# Patient Record
Sex: FEMALE | Race: WHITE | NOT HISPANIC OR LATINO | Employment: OTHER | URBAN - METROPOLITAN AREA
[De-identification: names, ages, dates, MRNs, and addresses within clinical notes are randomized per-mention and may not be internally consistent; named-entity substitution may affect disease eponyms.]

---

## 2019-09-09 ENCOUNTER — OFFICE VISIT (OUTPATIENT)
Dept: OBGYN | Facility: OTHER | Age: 34
End: 2019-09-09

## 2019-09-09 VITALS
SYSTOLIC BLOOD PRESSURE: 110 MMHG | BODY MASS INDEX: 19.59 KG/M2 | HEART RATE: 100 BPM | DIASTOLIC BLOOD PRESSURE: 76 MMHG | WEIGHT: 124.8 LBS | HEIGHT: 67 IN

## 2019-09-09 DIAGNOSIS — F11.90 OPIOID USE DISORDER: Primary | ICD-10-CM

## 2019-09-09 DIAGNOSIS — Z12.4 CERVICAL CANCER SCREENING: ICD-10-CM

## 2019-09-09 DIAGNOSIS — Z11.3 ROUTINE SCREENING FOR STI (SEXUALLY TRANSMITTED INFECTION): ICD-10-CM

## 2019-09-09 LAB
ALBUMIN SERPL-MCNC: 4.2 G/DL (ref 3.4–5)
ALP SERPL-CCNC: 74 U/L (ref 40–150)
ALT SERPL W P-5'-P-CCNC: 36 U/L (ref 0–50)
ANION GAP SERPL CALCULATED.3IONS-SCNC: 6 MMOL/L (ref 3–14)
AST SERPL W P-5'-P-CCNC: 23 U/L (ref 0–45)
BILIRUB SERPL-MCNC: 0.3 MG/DL (ref 0.2–1.3)
BUN SERPL-MCNC: 7 MG/DL (ref 7–30)
CALCIUM SERPL-MCNC: 9.4 MG/DL (ref 8.5–10.1)
CHLORIDE SERPL-SCNC: 108 MMOL/L (ref 94–109)
CO2 SERPL-SCNC: 27 MMOL/L (ref 20–32)
CREAT SERPL-MCNC: 0.63 MG/DL (ref 0.52–1.04)
GFR SERPL CREATININE-BSD FRML MDRD: >90 ML/MIN/{1.73_M2}
GLUCOSE SERPL-MCNC: 99 MG/DL (ref 70–99)
POTASSIUM SERPL-SCNC: 4.2 MMOL/L (ref 3.4–5.3)
PROT SERPL-MCNC: 7.5 G/DL (ref 6.8–8.8)
SODIUM SERPL-SCNC: 141 MMOL/L (ref 133–144)

## 2019-09-09 PROCEDURE — 80053 COMPREHEN METABOLIC PANEL: CPT | Performed by: OBSTETRICS & GYNECOLOGY

## 2019-09-09 PROCEDURE — 36415 COLL VENOUS BLD VENIPUNCTURE: CPT | Performed by: OBSTETRICS & GYNECOLOGY

## 2019-09-09 PROCEDURE — 87591 N.GONORRHOEAE DNA AMP PROB: CPT | Performed by: OBSTETRICS & GYNECOLOGY

## 2019-09-09 PROCEDURE — 87389 HIV-1 AG W/HIV-1&-2 AB AG IA: CPT | Performed by: OBSTETRICS & GYNECOLOGY

## 2019-09-09 PROCEDURE — 87340 HEPATITIS B SURFACE AG IA: CPT | Performed by: OBSTETRICS & GYNECOLOGY

## 2019-09-09 PROCEDURE — 87624 HPV HI-RISK TYP POOLED RSLT: CPT | Performed by: OBSTETRICS & GYNECOLOGY

## 2019-09-09 PROCEDURE — 99203 OFFICE O/P NEW LOW 30 MIN: CPT | Performed by: OBSTETRICS & GYNECOLOGY

## 2019-09-09 PROCEDURE — 86780 TREPONEMA PALLIDUM: CPT | Performed by: OBSTETRICS & GYNECOLOGY

## 2019-09-09 PROCEDURE — G0145 SCR C/V CYTO,THINLAYER,RESCR: HCPCS | Performed by: OBSTETRICS & GYNECOLOGY

## 2019-09-09 PROCEDURE — 86803 HEPATITIS C AB TEST: CPT | Performed by: OBSTETRICS & GYNECOLOGY

## 2019-09-09 PROCEDURE — 87491 CHLMYD TRACH DNA AMP PROBE: CPT | Performed by: OBSTETRICS & GYNECOLOGY

## 2019-09-09 RX ORDER — BUPRENORPHINE HYDROCHLORIDE AND NALOXONE HYDROCHLORIDE DIHYDRATE 8; 2 MG/1; MG/1
1 TABLET SUBLINGUAL DAILY
Qty: 12 TABLET | Refills: 0 | Status: SHIPPED | OUTPATIENT
Start: 2019-09-09 | End: 2019-10-14

## 2019-09-09 RX ORDER — BUPRENORPHINE HYDROCHLORIDE AND NALOXONE HYDROCHLORIDE DIHYDRATE 2; .5 MG/1; MG/1
1 TABLET SUBLINGUAL 2 TIMES DAILY
Qty: 8 TABLET | Refills: 0 | Status: SHIPPED | OUTPATIENT
Start: 2019-09-09 | End: 2019-10-14

## 2019-09-09 SDOH — HEALTH STABILITY: MENTAL HEALTH: HOW OFTEN DO YOU HAVE A DRINK CONTAINING ALCOHOL?: NEVER

## 2019-09-09 ASSESSMENT — MIFFLIN-ST. JEOR: SCORE: 1301.33

## 2019-09-09 NOTE — PROGRESS NOTES
Clinic Note    CC:    Chief Complaint   Patient presents with     Consult     discuss suboxone        HPI:  Francisca Reed is a 34 year old  woman with history of opioid addiction, presents to initiate suboxone therapy. Using opioids (pills) for 10 years, first began after a back surgery, has used on an off, mostly gotten illicitly, using a combination of suboxone and oxy over past month, no consistent dosing. She does have a depression hx, also relatively severe postpartum depression, denies other mental health hx, denies abuse, violence, sexual trauma hx. Identifies as having an opioid addiction and wants help. , stable relationship, parenting children. Of note, she is not insured.      Ob Hx:  s/p CS x2 including 36w twins  Gyn Hx: No LMP recorded (lmp unknown). (Menstrual status: IUD).    Last pap ASCUS, HPV+ (), hx of Colposcopy x2   Using an IUD for birth control  PMH:   History reviewed. No pertinent past medical history.  SurgHx:   Past Surgical History:   Procedure Laterality Date     BACK SURGERY      fusion left side lower back     broken bone      arm     C BREAST AUGMENTATION  2010      SECTION      x2     FamHx:   Family History   Problem Relation Age of Onset     Diabetes Father      SocHx:   Social History     Socioeconomic History     Marital status: Single     Spouse name: None     Number of children: None     Years of education: None     Highest education level: None   Occupational History     None   Social Needs     Financial resource strain: None     Food insecurity:     Worry: None     Inability: None     Transportation needs:     Medical: None     Non-medical: None   Tobacco Use     Smoking status: Current Every Day Smoker     Smokeless tobacco: Never Used   Substance and Sexual Activity     Alcohol use: Never     Frequency: Never     Drug use: Yes     Types: Oxycodone     Sexual activity: Yes     Partners: Male     Birth control/protection: IUD      "Comment: mirena - placed 2 years ago   Lifestyle     Physical activity:     Days per week: None     Minutes per session: None     Stress: None   Relationships     Social connections:     Talks on phone: None     Gets together: None     Attends [employer] service: None     Active member of club or organization: None     Attends meetings of clubs or organizations: None     Relationship status: None     Intimate partner violence:     Fear of current or ex partner: None     Emotionally abused: None     Physically abused: None     Forced sexual activity: None   Other Topics Concern     None   Social History Narrative     None     Allergies:   Amoxicillin  Current Medications:   Current Outpatient Medications   Medication Sig Dispense Refill     buprenorphine-naloxone (SUBOXONE) 2-0.5 MG SUBL sublingual tablet Place 1 tablet under the tongue 2 times daily 8 tablet 0     IBUPROFEN PO        levonorgestrel (MIRENA) 20 MCG/24HR IUD 1 each by Intrauterine route once       ROS: 10 point ROS negative other than as noted in the HPI    EXAM:  Vitals:    09/09/19 1116   BP: 110/76   BP Location: Right arm   Patient Position: Chair   Cuff Size: Adult Regular   Pulse: 100   Weight: 56.6 kg (124 lb 12.8 oz)   Height: 1.69 m (5' 6.54\")    Body mass index is 19.82 kg/m .    Gen: Alert, oriented, appropriately interactive, NAD  CV: RRR, no murmurs, no extra heart sounds, 2+ peripheral pulses  Resp: CTAB, good effort without distress   Breasts: implants bilaterally, no axillary adenopathy, no dominant masses, no skin changes, no nipple discharge, nontender  Abdomen: soft, non tender, non distended, no masses, no hernias. No inguinal lymphadenopathy.   Perineum: no lesions; normal appearing external genitalia, bartholins glands, urethra, skenes glands  Vagina: no masses or lesions or discharge, normally rugated.  Cervix: no masses or lesions or discharge   Bimanual exam:   Uterus: midline, anteverted, small, mobile  no masses, " non-tender  Adnexa: no masses or tenderness appreciated   No cervical motion tenderness  MSK: normal gait, symmetric movements UE & LE  Lower extremities: non-tender, no edema    Labs & Imaging:  Results for orders placed or performed in visit on 19 (from the past 24 hour(s))   Comprehensive metabolic panel (BMP + Alb, Alk Phos, ALT, AST, Total. Bili, TP)   Result Value Ref Range    Sodium 141 133 - 144 mmol/L    Potassium 4.2 3.4 - 5.3 mmol/L    Chloride 108 94 - 109 mmol/L    Carbon Dioxide 27 20 - 32 mmol/L    Anion Gap 6 3 - 14 mmol/L    Glucose 99 70 - 99 mg/dL    Urea Nitrogen 7 7 - 30 mg/dL    Creatinine 0.63 0.52 - 1.04 mg/dL    GFR Estimate >90 >60 mL/min/[1.73_m2]    GFR Estimate If Black >90 >60 mL/min/[1.73_m2]    Calcium 9.4 8.5 - 10.1 mg/dL    Bilirubin Total 0.3 0.2 - 1.3 mg/dL    Albumin 4.2 3.4 - 5.0 g/dL    Protein Total 7.5 6.8 - 8.8 g/dL    Alkaline Phosphatase 74 40 - 150 U/L    ALT 36 0 - 50 U/L    AST 23 0 - 45 U/L       ASSESSMENT/PLAN: Francisca Reed is a 34 year old  woman with history of opioid use disorder who presents for evaluation of suboxone initiation.     1. Opioid use disorder (H)  - Presenting in withdrawal, no use in two days, titrated to 8mg over one hour in clinic, instructed to potentially titrate up to 12 vs 16mg as needed.   - ID and liver labs today, normal physical exam  - Reviewed expectations, suboxone contract signed  - will return next week to f/u dose, plan for ongoing cares   - buprenorphine-naloxone (SUBOXONE) 2-0.5 MG SUBL sublingual tablet; Place 1 tablet under the tongue 2 times daily  Dispense: 8 tablet; Refill: 0    2. Routine screening for STI (sexually transmitted infection)  - No IVDU, no know sexual exposure to an IVDU  - Comprehensive metabolic panel (BMP + Alb, Alk Phos, ALT, AST, Total. Bili, TP)  - Hepatitis C antibody  - HIV Antigen Antibody Combo  - Treponema Abs w Reflex to RPR and Titer  - Hepatitis B surface antigen  - Chlamydia  trachomatis PCR  - Neisseria gonorrhoeae PCR    3. Cervical cancer screening  - Normal pelvic exam today  - Pap imaged thin layer screen with HPV - recommended age 30 - 65 years (select HPV order below)    Silvino Amado MD MD  9/9/2019 11:43 AM

## 2019-09-09 NOTE — LETTER
September 17, 2019    Francisca Reed  [address] [place] [address]  [place] [address]    Dear ,  This letter is regarding your recent Pap smear (cervical cancer screening) and Human Papillomavirus (HPV) test.  We are happy to inform you that your Pap smear result is normal. Cervical cancer is closely linked with certain types of HPV. Your results showed no evidence of high-risk HPV.  We recommend you have your next PAP smear and HPV test in 5 years.  You will still need to return to the clinic every year for an annual exam and other preventive tests.  If you have additional questions regarding this result, please call our registered nurse, Guerline at [phone].  Sincerely,    Silvino Amado MD /[facility]

## 2019-09-09 NOTE — NURSING NOTE
"Chief Complaint   Patient presents with     Consult     discuss suboxone       Initial /76 (BP Location: Right arm, Patient Position: Chair, Cuff Size: Adult Regular)   Pulse 100   Ht 1.69 m (5' 6.54\")   Wt 56.6 kg (124 lb 12.8 oz)   LMP  (LMP Unknown)   BMI 19.82 kg/m   Estimated body mass index is 19.82 kg/m  as calculated from the following:    Height as of this encounter: 1.69 m (5' 6.54\").    Weight as of this encounter: 56.6 kg (124 lb 12.8 oz).  BP completed using cuff size: regular    No obstetric history on file.    The following HM Due: pap smear      The following patient reported/Care Every where data was sent to:  P ABSTRACT QUALITY INITIATIVES [[id number]]  Pap smear done on this date: 1/22/2016 (approximately), by this group: [facility], results were NIL.      patient has appointment for today       Marge Silva CMA  September 9, 2019    "

## 2019-09-10 LAB
C TRACH DNA SPEC QL NAA+PROBE: NEGATIVE
HBV SURFACE AG SERPL QL IA: NONREACTIVE
HCV AB SERPL QL IA: NONREACTIVE
HIV 1+2 AB+HIV1 P24 AG SERPL QL IA: NONREACTIVE
N GONORRHOEA DNA SPEC QL NAA+PROBE: NEGATIVE
SPECIMEN SOURCE: NORMAL
SPECIMEN SOURCE: NORMAL
T PALLIDUM AB SER QL: NONREACTIVE

## 2019-09-13 LAB
COPATH REPORT: NORMAL
PAP: NORMAL

## 2019-09-16 ENCOUNTER — OFFICE VISIT (OUTPATIENT)
Dept: OBGYN | Facility: OTHER | Age: 34
End: 2019-09-16

## 2019-09-16 VITALS
SYSTOLIC BLOOD PRESSURE: 110 MMHG | HEART RATE: 90 BPM | DIASTOLIC BLOOD PRESSURE: 60 MMHG | WEIGHT: 121.4 LBS | BODY MASS INDEX: 19.28 KG/M2

## 2019-09-16 DIAGNOSIS — F11.90 OPIOID USE DISORDER: Primary | ICD-10-CM

## 2019-09-16 LAB
FINAL DIAGNOSIS: NORMAL
HPV HR 12 DNA CVX QL NAA+PROBE: NEGATIVE
HPV16 DNA SPEC QL NAA+PROBE: NEGATIVE
HPV18 DNA SPEC QL NAA+PROBE: NEGATIVE
SPECIMEN DESCRIPTION: NORMAL
SPECIMEN SOURCE CVX/VAG CYTO: NORMAL

## 2019-09-16 PROCEDURE — 99213 OFFICE O/P EST LOW 20 MIN: CPT | Performed by: OBSTETRICS & GYNECOLOGY

## 2019-09-16 RX ORDER — BUPRENORPHINE HYDROCHLORIDE AND NALOXONE HYDROCHLORIDE DIHYDRATE 2; .5 MG/1; MG/1
2 TABLET SUBLINGUAL DAILY
Qty: 42 TABLET | Refills: 0 | Status: SHIPPED | OUTPATIENT
Start: 2019-09-16 | End: 2019-10-14

## 2019-09-16 RX ORDER — BUPRENORPHINE HYDROCHLORIDE AND NALOXONE HYDROCHLORIDE DIHYDRATE 8; 2 MG/1; MG/1
1 TABLET SUBLINGUAL DAILY
Qty: 21 TABLET | Refills: 0 | Status: SHIPPED | OUTPATIENT
Start: 2019-09-16 | End: 2020-01-09

## 2019-09-16 NOTE — NURSING NOTE
"Chief Complaint   Patient presents with     Consult     suboxone       Initial /60 (BP Location: Right arm, Patient Position: Chair, Cuff Size: Adult Regular)   Pulse 90   Wt 55.1 kg (121 lb 6.4 oz)   LMP  (LMP Unknown)   BMI 19.28 kg/m   Estimated body mass index is 19.28 kg/m  as calculated from the following:    Height as of 19: 1.69 m (5' 6.54\").    Weight as of this encounter: 55.1 kg (121 lb 6.4 oz).  BP completed using cuff size: regular        The following HM Due: NONE      The following patient reported/Care Every where data was sent to:  P ABSTRACT QUALITY INITIATIVES [[id number]]      Marge Silva, OLIVIA  2019                "

## 2019-09-17 NOTE — PROGRESS NOTES
Clinic Note    CC:    Chief Complaint   Patient presents with     Consult     suboxone        HPI:  Francisca Reed is a 34 year old  who presents for suboxone follow up, initiated on 9/9, now on 12mg/day. She states doing well, no withdrawal symptoms, no opioid relapse, did use one ativan with very stressful well though goal to discontinue benzos as well. Overall feels much better on stable dose of Suboxone, feeling happy and relieved.     History: Using opioids (pills) for 10 years, first began after a back surgery, has used on an off, mostly gotten illicitly, using a combination of suboxone and oxy over past month prior to initiation, no consistent dosing. She does have a depression hx, also relatively severe postpartum depression, denies other mental health hx, denies abuse, violence, sexual trauma hx. Identifies as having an opioid addiction and wants help. , stable relationship, parenting children. Of note, she is not insured.      EXAM:  Vitals:    09/16/19 1115   BP: 110/60   BP Location: Right arm   Patient Position: Chair   Cuff Size: Adult Regular   Pulse: 90   Weight: 55.1 kg (121 lb 6.4 oz)    Body mass index is 19.28 kg/m .    Gen: Alert, oriented, appropriately interactive, NAD  Full normal exam on 9/9/19, not repeated today    Labs & Imaging:  CMP wnl  PAP NIL, HPV-  Hep C-  HBSA-  Trep-  HIV-  GC/Chlam-    ASSESSMENT/PLAN: Francisca Reed is a 34 year old  who presents for suboxone follow up    1. Opioid use disorder (H)  - Will continue 12 mg dose  - Plan UDS at next appt  - buprenorphine-naloxone (SUBOXONE) 8-2 MG SUBL sublingual tablet; Place 1 tablet under the tongue daily for 21 days Daily dose 12mg  Dispense: 21 tablet; Refill: 0  - buprenorphine-naloxone (SUBOXONE) 2-0.5 MG SUBL sublingual tablet; Place 2 tablets under the tongue daily for 21 days  Dispense: 42 tablet; Refill: 0    Silvino Amado MD  9/16/2019 10:33 PM

## 2019-09-19 ENCOUNTER — TELEPHONE (OUTPATIENT)
Dept: FAMILY MEDICINE | Facility: OTHER | Age: 34
End: 2019-09-19

## 2019-09-19 NOTE — TELEPHONE ENCOUNTER
Reason for Call:  Other prescription    Detailed comments: Suboxone.  Patient would like to get 1 1/2 tab of 8 mg daily cause it would be cheaper for her so the quantity would change She would like it to go to [facility]    Phone Number Patient can be reached at: Cell number on file:    Telephone Information:   Mobile [phone]       Best Time: anytime    Can we leave a detailed message on this number? YES    Call taken on 9/19/2019 at 10:54 AM by Macario Hannah

## 2019-10-14 ENCOUNTER — OFFICE VISIT (OUTPATIENT)
Dept: OBGYN | Facility: OTHER | Age: 34
End: 2019-10-14

## 2019-10-14 VITALS
HEART RATE: 86 BPM | DIASTOLIC BLOOD PRESSURE: 70 MMHG | SYSTOLIC BLOOD PRESSURE: 114 MMHG | WEIGHT: 122.4 LBS | BODY MASS INDEX: 19.44 KG/M2

## 2019-10-14 DIAGNOSIS — F11.90 OPIOID USE DISORDER: Primary | ICD-10-CM

## 2019-10-14 PROCEDURE — 99213 OFFICE O/P EST LOW 20 MIN: CPT | Performed by: OBSTETRICS & GYNECOLOGY

## 2019-10-14 PROCEDURE — 80307 DRUG TEST PRSMV CHEM ANLYZR: CPT | Mod: 90 | Performed by: OBSTETRICS & GYNECOLOGY

## 2019-10-14 PROCEDURE — 99000 SPECIMEN HANDLING OFFICE-LAB: CPT | Performed by: OBSTETRICS & GYNECOLOGY

## 2019-10-14 RX ORDER — BUPRENORPHINE HYDROCHLORIDE AND NALOXONE HYDROCHLORIDE DIHYDRATE 8; 2 MG/1; MG/1
1 TABLET SUBLINGUAL DAILY
Qty: 21 TABLET | Refills: 0 | Status: SHIPPED | OUTPATIENT
Start: 2019-10-14 | End: 2019-10-28

## 2019-10-14 NOTE — NURSING NOTE
"Chief Complaint   Patient presents with     Follow Up     suboxone       Initial /70 (BP Location: Right arm, Patient Position: Chair, Cuff Size: Adult Regular)   Pulse 86   Wt 55.5 kg (122 lb 6.4 oz)   LMP  (LMP Unknown)   BMI 19.44 kg/m   Estimated body mass index is 19.44 kg/m  as calculated from the following:    Height as of 19: 1.69 m (5' 6.54\").    Weight as of this encounter: 55.5 kg (122 lb 6.4 oz).  BP completed using cuff size: regular        The following HM Due: NONE      The following patient reported/Care Every where data was sent to:  P ABSTRACT QUALITY INITIATIVES [[id number]]       Marge Silva, [facility]  2019           "

## 2019-10-18 LAB — COMPREHEN DRUG ANALYSIS UR: NORMAL

## 2019-10-28 ENCOUNTER — OFFICE VISIT (OUTPATIENT)
Dept: OBGYN | Facility: OTHER | Age: 34
End: 2019-10-28

## 2019-10-28 VITALS
WEIGHT: 127.6 LBS | HEART RATE: 76 BPM | DIASTOLIC BLOOD PRESSURE: 60 MMHG | BODY MASS INDEX: 20.27 KG/M2 | SYSTOLIC BLOOD PRESSURE: 108 MMHG

## 2019-10-28 DIAGNOSIS — F11.90 OPIOID USE DISORDER: Primary | ICD-10-CM

## 2019-10-28 PROCEDURE — 99213 OFFICE O/P EST LOW 20 MIN: CPT | Performed by: OBSTETRICS & GYNECOLOGY

## 2019-10-28 RX ORDER — BUPRENORPHINE HYDROCHLORIDE AND NALOXONE HYDROCHLORIDE DIHYDRATE 8; 2 MG/1; MG/1
1 TABLET SUBLINGUAL DAILY
Qty: 14 TABLET | Refills: 0 | Status: SHIPPED | OUTPATIENT
Start: 2019-10-28 | End: 2019-11-18

## 2019-10-28 NOTE — NURSING NOTE
"Chief Complaint   Patient presents with     Follow Up     suboxone       Initial /60 (BP Location: Right arm, Patient Position: Chair, Cuff Size: Adult Regular)   Pulse 76   Wt 57.9 kg (127 lb 9.6 oz)   LMP  (LMP Unknown)   BMI 20.27 kg/m   Estimated body mass index is 20.27 kg/m  as calculated from the following:    Height as of 19: 1.69 m (5' 6.54\").    Weight as of this encounter: 57.9 kg (127 lb 9.6 oz).  BP completed using cuff size: regular        The following HM Due: NONE      The following patient reported/Care Every where data was sent to:  P ABSTRACT QUALITY INITIATIVES [[id number]]       Marge Silva, [facility]  2019           "

## 2019-10-30 NOTE — PROGRESS NOTES
Clinic Note    CC:    Chief Complaint   Patient presents with     Follow Up     suboxone      HPI:  Francisca Reed is a 34 year old  who presents for suboxone follow up, initiated on . She is taking 8mg without withdrawal symptoms.  Amphetamines on last UDS (10/14), reports occasional use of adderall, trying to ween off.   She would like to see a mental health specialist though no insurance, and unable to afford any additional care. States her  controls all the money including the child support she receives and hands over to him. He is unwilling to support her application for health care.   She started Sertraline last appt, now titrated to full dose, no side effects noted, will continue to use.   History: Using opioids (pills) for 10 years, first began after a back surgery, has used on an off, mostly gotten illicitly, using a combination of suboxone and oxy over past month prior to initiation, no consistent dosing. She does have a depression hx, also relatively severe postpartum depression, denies other mental health hx, denies abuse, violence, sexual trauma hx. Identifies as having an opioid addiction and wants help. ,, parenting children. Of note, she is not insured.      PMH:   Past Medical History:   Diagnosis Date     C section 2008     Papanicolaou smear of cervix with low grade squamous intraepithelial lesion (LGSIL) 2008 Pap: LSIL (see Care Everywhere)     SurgHx:   Past Surgical History:   Procedure Laterality Date     BACK SURGERY      fusion left side lower back     BREAST SURGERY  2010     broken bone      arm     C BREAST AUGMENTATION        SECTION        SECTION  2017    x2     GYN SURGERY       INJECT JOINT SACROILIAC       FamHx:   Family History   Problem Relation Age of Onset     Diabetes Father      SocHx:   Social History     Socioeconomic History     Marital status: Single     Spouse name: None     Number of  children: None     Years of education: None     Highest education level: None   Occupational History     None   Social Needs     Financial resource strain: None     Food insecurity:     Worry: None     Inability: None     Transportation needs:     Medical: None     Non-medical: None   Tobacco Use     Smoking status: Current Every Day Smoker     Packs/day: 0.00     Types: Cigarettes     Smokeless tobacco: Never Used   Substance and Sexual Activity     Alcohol use: Never     Frequency: Never     Drug use: Yes     Types: Oxycodone     Sexual activity: Yes     Partners: Male     Birth control/protection: I.U.D.     Comment: mirena - placed 2 years ago   Lifestyle     Physical activity:     Days per week: None     Minutes per session: None     Stress: None   Relationships     Social connections:     Talks on phone: None     Gets together: None     Attends [employer] service: None     Active member of club or organization: None     Attends meetings of clubs or organizations: None     Relationship status: None     Intimate partner violence:     Fear of current or ex partner: None     Emotionally abused: None     Physically abused: None     Forced sexual activity: None   Other Topics Concern     Parent/sibling w/ CABG, MI or angioplasty before 65F 55M? Not Asked   Social History Narrative    ** Merged History Encounter **          Allergies:   Amoxicillin  Current Medications:   Current Outpatient Medications   Medication Sig Dispense Refill     acetaminophen (TYLENOL) 650 MG CR tablet Take 1 tablet by mouth every 8 hours as needed for pain. 100 tablet 11     buprenorphine-naloxone (SUBOXONE) 8-2 MG SUBL sublingual tablet Place 1 tablet under the tongue daily Patient is initiating suboxone, titrated to 8mg first day, final daily dose will be either 8mg, 12mg, or 16mg, patient given instructions on how to titrate to final dose. 14 tablet 0     Ibuprofen 200 MG capsule Take 200 mg by mouth every 8 hours as needed for pain.        levonorgestrel (MIRENA) 20 MCG/24HR IUD 1 each by Intrauterine route once       sertraline (ZOLOFT) 50 MG tablet Take 1 tablet (50 mg) by mouth daily Take 1/2 pill daily for the first week, then take one daily 60 tablet 3     ROS: 10 point ROS negative other than as noted in the HPI    EXAM:  Vitals:    10/28/19 1137   BP: 108/60   BP Location: Right arm   Patient Position: Chair   Cuff Size: Adult Regular   Pulse: 76   Weight: 57.9 kg (127 lb 9.6 oz)    Body mass index is 20.27 kg/m .    Gen: Alert, oriented, appropriately interactive, NAD  Exam not repeated today     Labs & Imaging: (9/9/19)  CMP wnl  PAP NIL, HPV-  Hep C-  HBSA-  Trep-  HIV-  GC/Chlam-    ASSESSMENT/PLAN: Francisca Reed is a 34 year old  who presents for suboxone follow up.    1. Opioid use disorder (H)  - Continue 8mg/day  - Drug  Screen showing amphetamines (adderall)  - buprenorphine-naloxone (SUBOXONE) 8-2 MG SUBL sublingual tablet; Place 1 tablet under the tongue daily Patient is initiating suboxone, titrated to 8mg first day, final daily dose will be either 8mg, 12mg, or 16mg, patient given instructions on how to titrate to final dose.  Dispense: 14 tablet; Refill: 0     2. Postpartum depression  - I support her desire for mental health care, encouraged her to reach out to her [facility]  for resources   - Continue sertraline (ZOLOFT) 50 MG tablet    Silvino Amado MD   10/29/2019 10:37 PM

## 2019-11-18 ENCOUNTER — OFFICE VISIT (OUTPATIENT)
Dept: OBGYN | Facility: OTHER | Age: 34
End: 2019-11-18
Payer: MEDICAID

## 2019-11-18 VITALS
BODY MASS INDEX: 19.22 KG/M2 | DIASTOLIC BLOOD PRESSURE: 60 MMHG | HEART RATE: 80 BPM | SYSTOLIC BLOOD PRESSURE: 110 MMHG | WEIGHT: 121 LBS

## 2019-11-18 DIAGNOSIS — F11.90 OPIOID USE DISORDER: Primary | ICD-10-CM

## 2019-11-18 DIAGNOSIS — G47.00 PERSISTENT INSOMNIA: ICD-10-CM

## 2019-11-18 PROCEDURE — 99213 OFFICE O/P EST LOW 20 MIN: CPT | Performed by: OBSTETRICS & GYNECOLOGY

## 2019-11-18 RX ORDER — TRAZODONE HYDROCHLORIDE 50 MG/1
50 TABLET, FILM COATED ORAL
Qty: 30 TABLET | Refills: 3 | Status: SHIPPED | OUTPATIENT
Start: 2019-11-18 | End: 2020-01-09

## 2019-11-18 RX ORDER — BUPRENORPHINE HYDROCHLORIDE AND NALOXONE HYDROCHLORIDE DIHYDRATE 8; 2 MG/1; MG/1
1 TABLET SUBLINGUAL DAILY
Qty: 42 TABLET | Refills: 0 | Status: SHIPPED | OUTPATIENT
Start: 2019-11-18 | End: 2019-12-16

## 2019-11-18 NOTE — NURSING NOTE
"Chief Complaint   Patient presents with     Follow Up     suboxone       Initial /60 (BP Location: Right arm, Patient Position: Chair, Cuff Size: Adult Regular)   Pulse 80   Wt 54.9 kg (121 lb)   LMP  (LMP Unknown)   BMI 19.22 kg/m   Estimated body mass index is 19.22 kg/m  as calculated from the following:    Height as of 19: 1.69 m (5' 6.54\").    Weight as of this encounter: 54.9 kg (121 lb).  BP completed using cuff size: regular        The following HM Due: NONE      The following patient reported/Care Every where data was sent to:  P ABSTRACT QUALITY INITIATIVES [[id number]]       Marge Silva, OLIVIA  2019           "

## 2019-12-16 ENCOUNTER — OFFICE VISIT (OUTPATIENT)
Dept: OBGYN | Facility: OTHER | Age: 34
End: 2019-12-16
Payer: MEDICAID

## 2019-12-16 VITALS
SYSTOLIC BLOOD PRESSURE: 102 MMHG | DIASTOLIC BLOOD PRESSURE: 62 MMHG | BODY MASS INDEX: 18.8 KG/M2 | HEART RATE: 88 BPM | WEIGHT: 118.4 LBS

## 2019-12-16 DIAGNOSIS — R53.83 FATIGUE, UNSPECIFIED TYPE: ICD-10-CM

## 2019-12-16 DIAGNOSIS — F11.90 OPIOID USE DISORDER: Primary | ICD-10-CM

## 2019-12-16 PROCEDURE — 99213 OFFICE O/P EST LOW 20 MIN: CPT | Performed by: OBSTETRICS & GYNECOLOGY

## 2019-12-16 RX ORDER — BUPRENORPHINE HYDROCHLORIDE AND NALOXONE HYDROCHLORIDE DIHYDRATE 8; 2 MG/1; MG/1
1 TABLET SUBLINGUAL DAILY
Qty: 42 TABLET | Refills: 0 | Status: SHIPPED | OUTPATIENT
Start: 2019-12-16 | End: 2020-01-10

## 2019-12-16 SDOH — HEALTH STABILITY: MENTAL HEALTH: HOW OFTEN DO YOU HAVE A DRINK CONTAINING ALCOHOL?: MONTHLY OR LESS

## 2019-12-17 NOTE — PROGRESS NOTES
"Clinic Note    HPI:  Francisca Reed is a 34 year old  who presents for suboxone follow up, initiated on 9/9. She is taking 12 mg/day, doing well on this dose, denies withdrawal symptoms, denies relapse, craving. She feels that her opioid use disorder is being adequately managed at this time, though as the addiction symptoms become less prominent, she feels that her mental health concerns are coming into the forefront.      Amphetamines on last UDS (10/14), reports occasional use of adderall, trying to ween off. Currently reports that she is taking adderall approximately once q 6 mo.    She would like to see a mental health specialist though no insurance, and unable to afford any additional care. She has applied for a few \"grants\" but feels it has been difficult to find coverage for herself because of her husbands income, despite having the expense of 5 children. Acknowledges that better coverage would be good for her. Notes that her  thinks she may be able to get insurance \"in a year or two.\"    She does present with her  today, and they both express significant concerns for her mental health. Aside from reported depressed mood and baseline anxiety, she is describing cyclic patterns of withdraw and fatigue (she recently spend three days hiding in the garage, sleeps a lot), following by periods were she doesn't sleep for days, is very high energy, busy, cleans and does other busy work all night. She states she is concerned about bipolar. Has a strong family hx of this, and has been aware of this possible diagnosis in the past as well. From last visit: \"She reports that she has night terror \"week\" and bouts of insomnia every 6 weeks. She reports that these have been going on for years.\"     Still taking full dose of sertraline, feels it is helping somewhat, though not helping with the cyclic symptoms noted above. She also started trazodone last visit for insomnia, using rarely though states it helps. "      History: Using opioids (pills) for 10 years, first began after a back surgery, has used on an off, mostly gotten illicitly. She does have a depression hx, also relatively severe postpartum depression, denies abuse, violence, sexual trauma hx. Identifies as having an opioid addiction and wants help. , parenting children. No health insurance.     EXAM:  Vitals:    12/16/19 1147   BP: 102/62   BP Location: Right arm   Cuff Size: Adult Regular   Pulse: 88   Weight: 53.7 kg (118 lb 6.4 oz)    Body mass index is 18.8 kg/m .    Gen: Alert, oriented, appropriately interactive, NAD  Exam not repeated today    Labs & Imaging: (9/9/19)  CMP wnl  PAP NIL, HPV-  Hep C-  HBSA-  Trep-  HIV-  GC/Chlam-    Future CBC & TSH ordered     ASSESSMENT/PLAN: Francisca Reed is a 34 year old  who presents for suboxone follow up.     1. Opioid use disorder (H)  - Overall stable, no concerns for relapse, not craving   -  Taking 12 mg/day   - buprenorphine-naloxone (SUBOXONE) 8-2 MG SUBL sublingual tablet; Place 1 tablet under the tongue daily for 28 days 12mg per day, q24hr dose, will break tabs in half, take 1.5 tab per day  Dispense: 42 tablet; Refill: 0     2. Mood  - I reiterated that I am not a mental health specialist, and that I have significant concern regarding her mood symptoms. I am strongly recommending she see psychology and psychiatry. We reviewed that the addiction often masks other issues including often social, economic, mental health concerns, and once the addiction recovery begins, these other concerns become more prominent. I am encouraging her to take advantage of her progress so far and establish care with mental health for evaluation/treatment for possible bipolar disorder.   - Continue sertraline (ZOLOFT) 50 MG tablet  - Trazodone, 50 mg PRN HS for insomnia.     4. Fatigue  - CBC with platelets; Future  - TSH with free T4 reflex; Future    Silvino Amado MD   12/17/2019 4:54 PM

## 2019-12-20 ENCOUNTER — NURSE TRIAGE (OUTPATIENT)
Dept: NURSING | Facility: CLINIC | Age: 34
End: 2019-12-20

## 2019-12-20 NOTE — TELEPHONE ENCOUNTER
"Patient calling reporting rash starting 2 months ago with a \"super itchy on face, chest, torso, and back.\" Blisters \"almost like blood blisters.\" Reporting legs are covered, including inside of ears. Afebrile. Unknown cause. Little black dots \"that seem to rotate.\" Patient reporting areas crust over and peel. Reporting she had OB/GYN look at rash at last appointment and thought it may be scabies. Stating no other family members with symptoms.    Advised to be seen with in 24 hours. Spoke with Central Scheduling who advised no clinic appointments were available today. Reviewed Urgent Care options.    Caller verbalized understanding. Denies further questions.    Agustina Merrill RN  [facility]        Additional Information    Negative: Sudden onset of rash (within last 2 hours) and difficulty with breathing or swallowing    Negative: Difficult to awaken or acting confused (e.g., disoriented, slurred speech)    Negative: Fever and purple or blood-colored spots or dots    Negative: Too weak or sick to stand    Negative: Life-threatening reaction (anaphylaxis) in the past to similar substance (e.g., food, insect bite/sting, chemical, etc.) and < 2 hours since exposure    Negative: Sounds like a life-threatening emergency to the triager    Negative: Bright red, sunburn-like rash and current tampon use    Negative: Bright red, sunburn-like rash and current tampon use or nasal packing    Negative: Bright red, sunburn-like rash and wound infection or recent surgery    Negative: Bright red skin that peels off in sheets    Negative: Stiff neck (can't touch chin to chest)    Negative: Patient sounds very sick or weak to the triager    Negative: Fever    Face becomes swollen    Protocols used: RASH OR REDNESS - WIDESPREAD-A-OH      "

## 2019-12-22 ENCOUNTER — OFFICE VISIT (OUTPATIENT)
Dept: URGENT CARE | Facility: URGENT CARE | Age: 34
End: 2019-12-22
Payer: MEDICAID

## 2019-12-22 VITALS
HEART RATE: 85 BPM | TEMPERATURE: 97.5 F | WEIGHT: 110 LBS | OXYGEN SATURATION: 96 % | BODY MASS INDEX: 17.47 KG/M2 | SYSTOLIC BLOOD PRESSURE: 135 MMHG | DIASTOLIC BLOOD PRESSURE: 85 MMHG

## 2019-12-22 DIAGNOSIS — R21 RASH AND NONSPECIFIC SKIN ERUPTION: Primary | ICD-10-CM

## 2019-12-22 PROCEDURE — 99203 OFFICE O/P NEW LOW 30 MIN: CPT | Performed by: PHYSICIAN ASSISTANT

## 2019-12-22 RX ORDER — DOXYCYCLINE 100 MG/1
100 CAPSULE ORAL 2 TIMES DAILY WITH MEALS
Qty: 20 CAPSULE | Refills: 0 | Status: SHIPPED | OUTPATIENT
Start: 2019-12-22 | End: 2020-01-09

## 2019-12-22 RX ORDER — PERMETHRIN 50 MG/G
CREAM TOPICAL
Qty: 60 G | Refills: 1 | Status: SHIPPED | OUTPATIENT
Start: 2019-12-22 | End: 2020-01-09

## 2019-12-22 ASSESSMENT — ENCOUNTER SYMPTOMS
FATIGUE: 0
CHILLS: 0
CONSTITUTIONAL NEGATIVE: 1
WHEEZING: 0
CARDIOVASCULAR NEGATIVE: 1
WOUND: 0
PALPITATIONS: 0
RESPIRATORY NEGATIVE: 1
COUGH: 0
SHORTNESS OF BREATH: 0
FEVER: 0
COLOR CHANGE: 1
CHEST TIGHTNESS: 0

## 2019-12-22 NOTE — PROGRESS NOTES
Subjective   Francisca Reed is a 34 year old female who presents to clinic today for the following health issues:  HPI   Rash on her skin    Duration: 1month    Description (location/character/radiation): itchy rash all over her skin.  She states that bugs are crawling underneath her skin.      Intensity:  moderate    Accompanying signs and symptoms: No new soaps/lotions/detergent, no new medications or foods.  No one else in the family with similar rashes.  No URI symptoms or fevers.     History (similar episodes/previous evaluation): No recent travel or hotel stay.    Precipitating or alleviating factors: None    Therapies tried and outcome: OTC acne medications, lotions with minimal relief       Patient Active Problem List   Diagnosis     Intrauterine fetal death     CARDIOVASCULAR SCREENING; LDL GOAL LESS THAN 160     Fracture of hip (H)     Compression fx, lumbar spine (H)     Papanicolaou smear of cervix with low grade squamous intraepithelial lesion (LGSIL)     Past Surgical History:   Procedure Laterality Date     BACK SURGERY      fusion left side lower back     BREAST SURGERY  2010     broken bone  1996    arm     C BREAST AUGMENTATION        SECTION        SECTION  2017    x2     GYN SURGERY       INJECT JOINT SACROILIAC         Social History     Tobacco Use     Smoking status: Current Every Day Smoker     Packs/day: 0.00     Types: Cigarettes     Smokeless tobacco: Never Used   Substance Use Topics     Alcohol use: Yes     Frequency: Monthly or less     Family History   Problem Relation Age of Onset     Diabetes Father          Current Outpatient Medications   Medication Sig Dispense Refill     acetaminophen (TYLENOL) 650 MG CR tablet Take 1 tablet by mouth every 8 hours as needed for pain. 100 tablet 11     buprenorphine-naloxone (SUBOXONE) 8-2 MG SUBL sublingual tablet Place 1 tablet under the tongue daily for 28 days 12mg per day, q24hr dose, will break tabs in  half, take 1.5 tab per day 42 tablet 0     Ibuprofen 200 MG capsule Take 200 mg by mouth every 8 hours as needed for pain.       levonorgestrel (MIRENA) 20 MCG/24HR IUD 1 each by Intrauterine route once       sertraline (ZOLOFT) 50 MG tablet Take 1 tablet (50 mg) by mouth daily Take 1/2 pill daily for the first week, then take one daily 60 tablet 3     traZODone (DESYREL) 50 MG tablet Take 1 tablet (50 mg) by mouth nightly as needed for sleep 30 tablet 3     Allergies   Allergen Reactions     Amoxicillin      Reviewed and updated as needed this visit by Provider       Review of Systems   Constitutional: Negative.  Negative for chills, fatigue and fever.   Respiratory: Negative.  Negative for cough, chest tightness, shortness of breath and wheezing.    Cardiovascular: Negative.  Negative for chest pain, palpitations and peripheral edema.   Skin: Positive for color change and rash. Negative for pallor and wound.   All other systems reviewed and are negative.           Objective    /85   Pulse 85   Temp 97.5  F (36.4  C) (Tympanic)   Wt 49.9 kg (110 lb)   SpO2 96%   BMI 17.47 kg/m    Body mass index is 17.47 kg/m .  Physical Exam  Vitals signs and nursing note reviewed.   Constitutional:       General: She is not in acute distress.     Appearance: Normal appearance. She is well-developed and normal weight. She is not ill-appearing.   Skin:     General: Skin is warm and dry.      Findings: Erythema and rash present. No abrasion, bruising or wound. Rash is crusting and papular (present over her face). Rash is not macular, nodular, purpuric, pustular, scaling, urticarial or vesicular.   Neurological:      Mental Status: She is alert and oriented to person, place, and time.   Psychiatric:         Mood and Affect: Mood normal.         Behavior: Behavior normal.         Thought Content: Thought content normal.         Judgment: Judgment normal.             Assessment & Plan   Rash and nonspecific skin eruption:   ?scabies vs impetigo vs psychogenic induced vs contact/allergic dermatitis vs bed bugs.  Will give trial of permethrin cream, [id number] (allergic to amoxicillin) and recommended zyrtec for itching.  Discussed risks and benefits of medication along with side effects, direction for use, and photosensitivity.  Avoid triggers and irritating agents.  Avoid scratching to present secondary infection.  RTC if worsening rash or if she develops redness, swelling, drainage or fevers.  Will also send to DERM for further evaluation and management.   -     permethrin (ELIMITE) 5 % external cream; Apply cream from head to toe; leave on for 8-14 hours then wash off with water; reapply in 1 week if live mites appear.  -     DERMATOLOGY REFERRAL  -     doxycycline monohydrate (MONODOX) 100 MG capsule; Take 1 capsule (100 mg) by mouth 2 times daily (with meals) for 10 days Increases risk of heartburn and also sun sensitivity or sun burn.           Anjana Pro PA-C  [facility]

## 2020-01-09 ENCOUNTER — OFFICE VISIT (OUTPATIENT)
Dept: FAMILY MEDICINE | Facility: CLINIC | Age: 35
End: 2020-01-09
Payer: MEDICAID

## 2020-01-09 ENCOUNTER — TELEPHONE (OUTPATIENT)
Dept: PALLIATIVE MEDICINE | Facility: CLINIC | Age: 35
End: 2020-01-09

## 2020-01-09 VITALS
WEIGHT: 107.4 LBS | HEIGHT: 65 IN | BODY MASS INDEX: 17.9 KG/M2 | HEART RATE: 108 BPM | SYSTOLIC BLOOD PRESSURE: 109 MMHG | OXYGEN SATURATION: 100 % | DIASTOLIC BLOOD PRESSURE: 63 MMHG | RESPIRATION RATE: 20 BRPM | TEMPERATURE: 97.5 F

## 2020-01-09 DIAGNOSIS — R21 RASH: ICD-10-CM

## 2020-01-09 DIAGNOSIS — S32.000S COMPRESSION FRACTURE OF LUMBAR VERTEBRA, UNSPECIFIED LUMBAR VERTEBRAL LEVEL, SEQUELA: ICD-10-CM

## 2020-01-09 DIAGNOSIS — L29.9 ITCHING: ICD-10-CM

## 2020-01-09 DIAGNOSIS — L73.9 FOLLICULITIS: ICD-10-CM

## 2020-01-09 DIAGNOSIS — G89.29 CHRONIC BACK PAIN, UNSPECIFIED BACK LOCATION, UNSPECIFIED BACK PAIN LATERALITY: ICD-10-CM

## 2020-01-09 DIAGNOSIS — F42.4 EXCORIATION (SKIN-PICKING) DISORDER: ICD-10-CM

## 2020-01-09 DIAGNOSIS — Z09 FOLLOW-UP EXAM: Primary | ICD-10-CM

## 2020-01-09 DIAGNOSIS — F17.200 TOBACCO USE DISORDER: ICD-10-CM

## 2020-01-09 DIAGNOSIS — M54.9 CHRONIC BACK PAIN, UNSPECIFIED BACK LOCATION, UNSPECIFIED BACK PAIN LATERALITY: ICD-10-CM

## 2020-01-09 PROCEDURE — 99214 OFFICE O/P EST MOD 30 MIN: CPT | Performed by: NURSE PRACTITIONER

## 2020-01-09 RX ORDER — BACLOFEN 10 MG/1
TABLET ORAL
Qty: 108 TABLET | Refills: 1 | Status: SHIPPED | OUTPATIENT
Start: 2020-01-09 | End: 2020-03-16

## 2020-01-09 RX ORDER — HYDROXYZINE PAMOATE 50 MG/1
50 CAPSULE ORAL 3 TIMES DAILY PRN
Qty: 90 CAPSULE | Refills: 1 | Status: SHIPPED | OUTPATIENT
Start: 2020-01-09 | End: 2022-06-14

## 2020-01-09 RX ORDER — PREDNISONE 20 MG/1
TABLET ORAL
Qty: 20 TABLET | Refills: 0 | Status: SHIPPED | OUTPATIENT
Start: 2020-01-09 | End: 2020-03-20

## 2020-01-09 RX ORDER — GABAPENTIN 300 MG/1
300 CAPSULE ORAL 3 TIMES DAILY
Qty: 90 CAPSULE | Refills: 1 | Status: SHIPPED | OUTPATIENT
Start: 2020-01-09 | End: 2020-03-16

## 2020-01-09 ASSESSMENT — MIFFLIN-ST. JEOR: SCORE: 1203.65

## 2020-01-09 NOTE — PROGRESS NOTES
Subjective     Francisca Reed is a 34 year old female who presents to clinic today for the following health issues:    HPI   New Patient/Transfer of Care  Would like to discuss the following  1. Follow up rash- onset November- went to urgent care and was treated with antibiotic and 2 courses of permethrin as she thought it was scabies. Very itchy, keeps her from sleeping, has been crying due to length and severity of rash.  Does pick her skin. Has 5 kids and  and no one else with symptoms.   2. Discuss gabapentin and baclofen- would like to restart due to her chronic back pain from fracture and surgeries. Had stopped due to loss of insurance.         Patient Active Problem List   Diagnosis     Intrauterine fetal death     CARDIOVASCULAR SCREENING; LDL GOAL LESS THAN 160     Fracture of hip (H)     Compression fx, lumbar spine (H)     Papanicolaou smear of cervix with low grade squamous intraepithelial lesion (LGSIL)     Excoriation (skin-picking) disorder     Chronic back pain, unspecified back location, unspecified back pain laterality     Tobacco use disorder     Past Surgical History:   Procedure Laterality Date     BACK SURGERY      fusion left side lower back     BREAST SURGERY  2010     broken bone  1996    arm     C BREAST AUGMENTATION        SECTION  2008      SECTION  2017    x2     GYN SURGERY       INJECT JOINT SACROILIAC  [id number]       Social History     Tobacco Use     Smoking status: Current Every Day Smoker     Packs/day: 0.50     Types: Cigarettes     Smokeless tobacco: Never Used   Substance Use Topics     Alcohol use: Yes     Frequency: Monthly or less     Comment: rare     Family History   Problem Relation Age of Onset     Diabetes Father          Current Outpatient Medications   Medication Sig Dispense Refill     acetaminophen (TYLENOL) 650 MG CR tablet Take 1 tablet by mouth every 8 hours as needed for pain. 100 tablet 11     baclofen (LIORESAL) 10 MG tablet Take  "0.5 tablets (5 mg) by mouth 3 times daily for 7 days, THEN 1 tablet (10 mg) 3 times daily for 7 days, THEN 1.5 tablets (15 mg) 3 times daily. 108 tablet 1     buprenorphine-naloxone (SUBOXONE) 8-2 MG SUBL sublingual tablet Place 1 tablet under the tongue daily for 28 days 12mg per day, q24hr dose, will break tabs in half, take 1.5 tab per day 42 tablet 0     gabapentin (NEURONTIN) 300 MG capsule Take 1 capsule (300 mg) by mouth 3 times daily 90 capsule 1     hydrOXYzine (VISTARIL) 50 MG capsule Take 1 capsule (50 mg) by mouth 3 times daily as needed for itching 90 capsule 1     levonorgestrel (MIRENA) 20 MCG/24HR IUD 1 each by Intrauterine route once       predniSONE (DELTASONE) 20 MG tablet Take 3 tabs by mouth daily x 3 days, then 2 tabs daily x 3 days, then 1 tab daily x 3 days, then 1/2 tab daily x 3 days. 20 tablet 0     Allergies   Allergen Reactions     Amoxicillin      Recent Labs   Lab Test 09/09/19  1325   ALT 36   CR 0.63   GFRESTIMATED >90   GFRESTBLACK >90   POTASSIUM 4.2      BP Readings from Last 3 Encounters:   01/09/20 109/63   12/22/19 135/85   12/16/19 102/62    Wt Readings from Last 3 Encounters:   01/09/20 48.7 kg (107 lb 6.4 oz)   12/22/19 49.9 kg (110 lb)   12/16/19 53.7 kg (118 lb 6.4 oz)                    Reviewed and updated as needed this visit by Provider  Tobacco  Allergies  Meds  Problems  Med Hx  Surg Hx  Fam Hx         Review of Systems   ROS COMP: Constitutional, HEENT, cardiovascular, pulmonary, GI, , musculoskeletal, neuro, skin, endocrine and psych systems are negative, except as otherwise noted.      Objective    /63   Pulse 108   Temp 97.5  F (36.4  C) (Oral)   Resp 20   Ht 1.66 m (5' 5.35\")   Wt 48.7 kg (107 lb 6.4 oz)   SpO2 100%   BMI 17.68 kg/m    Body mass index is 17.68 kg/m .     Physical Exam   GENERAL: healthy, alert and no distress  RESP: lungs clear to auscultation - no rales, rhonchi or wheezes  CV: regular rate and rhythm, normal S1 S2, no S3 " "or S4, no murmur, click or rub, no peripheral edema and peripheral pulses strong  MS: no gross musculoskeletal defects noted, no edema reviewed chart and  r/t hx of back fracture.   SKIN: POSITIVE for many areas around entire body that have been picked and have scabbed over- those lesions not picked appear- like follicular erythematous papules.  No drainage or warmth  PSYCH: POSITIVE for figgity, trouble explaining progression of symptoms \"I don't even know where to start\" with head in hands, and tearful at times during visit    Diagnostic Test Results:  Labs reviewed in Epic  See orders        Assessment & Plan       ICD-10-CM    1. Follow-up exam Z09    2. Folliculitis L73.9 predniSONE (DELTASONE) 20 MG tablet   3. Tobacco use disorder F17.200 TOBACCO CESSATION ORDER FOR    4. Compression fracture of lumbar vertebra, unspecified lumbar vertebral level, sequela S32.000S baclofen (LIORESAL) 10 MG tablet     gabapentin (NEURONTIN) 300 MG capsule     PAIN MANAGEMENT REFERRAL   5. Chronic back pain, unspecified back location, unspecified back pain laterality M54.9 baclofen (LIORESAL) 10 MG tablet    G89.29 gabapentin (NEURONTIN) 300 MG capsule     PAIN MANAGEMENT REFERRAL   6. Rash R21 hydrOXYzine (VISTARIL) 50 MG capsule   7. Itching L29.9 hydrOXYzine (VISTARIL) 50 MG capsule   8. Excoriation (skin-picking) disorder F42.4         Tobacco Cessation:   reports that she has been smoking cigarettes. She has been smoking about 0.50 packs per day. She has never used smokeless tobacco.  Tobacco Cessation Action Plan: Information offered: Patient not interested at this time        See Patient Instructions: discussed take medication as prescribed. Do not pick on skin as this will worsen condition and scaring with risks for infection. Follow up if needed for persistent or worsening symptoms.     Return in about 4 weeks (around 2/6/2020), or if symptoms worsen or fail to improve.    Marielle Thompson, JACKIE  [facility] " LEONARDO

## 2020-01-10 ENCOUNTER — OFFICE VISIT (OUTPATIENT)
Dept: OBGYN | Facility: OTHER | Age: 35
End: 2020-01-10
Payer: MEDICAID

## 2020-01-10 VITALS
SYSTOLIC BLOOD PRESSURE: 94 MMHG | BODY MASS INDEX: 16.88 KG/M2 | WEIGHT: 105 LBS | HEIGHT: 66 IN | HEART RATE: 98 BPM | DIASTOLIC BLOOD PRESSURE: 54 MMHG

## 2020-01-10 DIAGNOSIS — F11.90 OPIOID USE DISORDER: ICD-10-CM

## 2020-01-10 PROCEDURE — 99213 OFFICE O/P EST LOW 20 MIN: CPT | Performed by: OBSTETRICS & GYNECOLOGY

## 2020-01-10 RX ORDER — LORATADINE 10 MG/1
10 TABLET ORAL DAILY
COMMUNITY
End: 2024-02-02

## 2020-01-10 RX ORDER — ALBUTEROL SULFATE 90 UG/1
AEROSOL, METERED RESPIRATORY (INHALATION)
COMMUNITY
Start: 2019-12-21 | End: 2020-07-24

## 2020-01-10 RX ORDER — BUPRENORPHINE HYDROCHLORIDE AND NALOXONE HYDROCHLORIDE DIHYDRATE 8; 2 MG/1; MG/1
1 TABLET SUBLINGUAL DAILY
Qty: 45 TABLET | Refills: 0 | Status: SHIPPED | OUTPATIENT
Start: 2020-01-10 | End: 2020-02-07

## 2020-01-10 RX ORDER — IBUPROFEN 800 MG/1
800 TABLET, FILM COATED ORAL EVERY 8 HOURS PRN
COMMUNITY
End: 2020-07-24

## 2020-01-10 ASSESSMENT — MIFFLIN-ST. JEOR: SCORE: 1204.03

## 2020-01-10 NOTE — NURSING NOTE
"Chief Complaint   Patient presents with     Follow Up     Suboxone       Initial BP 94/54 (BP Location: Right arm, Patient Position: Chair, Cuff Size: Adult Regular)   Pulse 98   Ht 1.678 m (5' 6.06\")   Wt 47.6 kg (105 lb)   LMP  (LMP Unknown)   BMI 16.92 kg/m   Estimated body mass index is 16.92 kg/m  as calculated from the following:    Height as of this encounter: 1.678 m (5' 6.06\").    Weight as of this encounter: 47.6 kg (105 lb).  BP completed using cuff size: regular        The following HM Due:       The following patient reported/Care Every where data was sent to:  P ABSTRACT QUALITY INITIATIVES [[id number]]       Marge Silva CMA  January 10, 2020           "

## 2020-01-11 NOTE — PROGRESS NOTES
"Clinic Note    CC:    Chief Complaint   Patient presents with     Follow Up     Suboxone      HPI:  Francisca Reed is a 34 year old  who presents for suboxone follow up. Stable from opioid use position, no craving, no relapse, no withdrawal symptoms. She continues to struggle with bipolar symptoms, finds that she is isolating herself a lot. She notes strong family hx, with mother and brother with severe bipolar disorder, and another brother with MDD. See note dating  for more detailed discussion of mental health. She states her mother is helping her with insurance and with establishing mental health care. She also established with a new PCP that she feels safe with, I encouraged her to schedule another appt with this provider to discuss mental health.     History: Using opioids (pills) for 10 years, first began after a back surgery, has used on an off, mostly gotten illicitly. She does have a depression hx, also relatively severe postpartum depression. She also describes a cyclic pattern with intermittent weeks of insomnia, other weeks of extreme isolation and depression. She denies abuse, violence, sexual trauma hx. Identifies as having an opioid addiction and wants help. , parenting children. No health insurance.     EXAM:  Vitals:    01/10/20 0939   BP: 94/54   BP Location: Right arm   Patient Position: Chair   Cuff Size: Adult Regular   Pulse: 98   Weight: 47.6 kg (105 lb)   Height: 1.678 m (5' 6.06\")    Body mass index is 16.92 kg/m .    Gen: Alert, oriented, appropriately interactive, NAD  Exam not repeated today     Labs & Imaging: (19)  CMP wnl  PAP NIL, HPV-  Hep C-  HBSA-  Trep-  HIV-  GC/Chlam-     Future CBC & TSH ordered     ASSESSMENT/PLAN: Francisca Reed is a 34 year old  who presents for suboxone follow up.     1. Opioid use disorder (H)  - Overall stable, no concerns for relapse, not craving   -  Taking 12 mg/day   - buprenorphine-naloxone (SUBOXONE) 8-2 MG SUBL sublingual " tablet; Place 1 tablet under the tongue daily 12mg per day, q24hr dose, will break tabs in half, take 1.5 tab per day  Dispense: 45 tablet; Refill: 0     2. Mood  - I reiterated that I am not a mental health specialist, and that I have significant concern regarding her mood symptoms. I am strongly recommending she see psychology and psychiatry. We reviewed that the addiction often masks other issues including often social, economic, mental health concerns, and once the addiction recovery begins, these other concerns become more prominent. I am encouraging her to take advantage of her progress so far and establish care with mental health for evaluation/treatment for possible bipolar disorder.   - Continue sertraline (ZOLOFT) 50 MG tablet  - Trazodone, 50 mg PRN HS for insomnia.      4. Fatigue  - CBC with platelets; Future  - TSH with free T4 reflex; Future    Silvino Amado MD   1/10/2020 10:28 PM

## 2020-02-07 ENCOUNTER — OFFICE VISIT (OUTPATIENT)
Dept: OBGYN | Facility: OTHER | Age: 35
End: 2020-02-07
Payer: MEDICAID

## 2020-02-07 VITALS
WEIGHT: 109.8 LBS | HEART RATE: 104 BPM | BODY MASS INDEX: 17.69 KG/M2 | DIASTOLIC BLOOD PRESSURE: 60 MMHG | SYSTOLIC BLOOD PRESSURE: 104 MMHG

## 2020-02-07 DIAGNOSIS — F11.90 OPIOID USE DISORDER: Primary | ICD-10-CM

## 2020-02-07 PROCEDURE — 99214 OFFICE O/P EST MOD 30 MIN: CPT | Performed by: OBSTETRICS & GYNECOLOGY

## 2020-02-07 RX ORDER — BUPRENORPHINE HYDROCHLORIDE AND NALOXONE HYDROCHLORIDE DIHYDRATE 8; 2 MG/1; MG/1
1 TABLET SUBLINGUAL DAILY
Qty: 45 TABLET | Refills: 0 | Status: SHIPPED | OUTPATIENT
Start: 2020-02-07 | End: 2020-02-07

## 2020-02-07 RX ORDER — BUPRENORPHINE HYDROCHLORIDE AND NALOXONE HYDROCHLORIDE DIHYDRATE 8; 2 MG/1; MG/1
1 TABLET SUBLINGUAL DAILY
Qty: 45 TABLET | Refills: 0 | Status: SHIPPED | OUTPATIENT
Start: 2020-02-07 | End: 2020-03-20

## 2020-02-07 NOTE — PROGRESS NOTES
Clinic Note    CC:    Chief Complaint   Patient presents with     Follow Up     suboxone      HPI:  Francisca Reed is a 34 year old  who presents for suboxone follow up. Since her last appt, she and her partner have , she reports that her ex is not allowing her to see her children. She relapsed, reports used meth twice with a friend last month (unclear if this happened before of after the separation.) She denies using other opioids, continues to take the suboxone daily, no withdrawal symptoms. She has yet to seek mental health care, has been strongly recommending to establish care with a mental health specialist, given significant concern for bipolar disorder. She denies current thoughts of self harm.     Strong family hx, with mother and brother with severe bipolar disorder, and another brother with MDD. See note dating  for more detailed discussion of mental health. She is currently staying with her mother, who is a nurse, has experience with mental health, and is planning to facilitate an assessment for mental health.      History: Using opioids (pills) for 10 years, first began after a back surgery, has used on an off, mostly gotten illicitly. She does have a depression hx, also relatively severe postpartum depression. She also describes a cyclic pattern with intermittent weeks of insomnia, other weeks of extreme isolation and depression/ leading to concerns for bipolar disorder, though no evaluation has been undertaken. She denies abuse, violence, sexual trauma hx. Identifies as having an opioid addiction and wants help.  (now ) parenting children (custody in dispute). She relapsed, used methamphetamines twice in January with stress of separation. No health insurance.     PMH:   Past Medical History:   Diagnosis Date     C section 2008     Papanicolaou smear of cervix with low grade squamous intraepithelial lesion (LGSIL) 2008 Pap: LSIL (see Care  Everywhere)     SurgHx:   Past Surgical History:   Procedure Laterality Date     BACK SURGERY  2011    fusion left side lower back     BREAST SURGERY  2010     broken bone  1996    arm     C BREAST AUGMENTATION  2010      SECTION  2008      SECTION  2017    x2     GYN SURGERY       INJECT JOINT SACROILIAC       FamHx:   Family History   Problem Relation Age of Onset     Diabetes Father      Bipolar Disorder Mother      Bipolar Disorder Brother      Depression Brother      SocHx:   Social History     Socioeconomic History     Marital status:      Spouse name: None     Number of children: None     Years of education: None     Highest education level: None   Occupational History     None   Social Needs     Financial resource strain: None     Food insecurity:     Worry: None     Inability: None     Transportation needs:     Medical: None     Non-medical: None   Tobacco Use     Smoking status: Current Every Day Smoker     Packs/day: 0.50     Types: Cigarettes     Smokeless tobacco: Never Used   Substance and Sexual Activity     Alcohol use: Yes     Frequency: Monthly or less     Comment: rare     Drug use: Not Currently     Types: Oxycodone     Sexual activity: Yes     Partners: Male     Birth control/protection: I.U.D.     Comment: mirena - placed 2 years ago   Lifestyle     Physical activity:     Days per week: None     Minutes per session: None     Stress: None   Relationships     Social connections:     Talks on phone: None     Gets together: None     Attends [employer] service: None     Active member of club or organization: None     Attends meetings of clubs or organizations: None     Relationship status: None     Intimate partner violence:     Fear of current or ex partner: None     Emotionally abused: None     Physically abused: None     Forced sexual activity: None   Other Topics Concern     Parent/sibling w/ CABG, MI or angioplasty before 65F 55M? Not Asked   Social History  Narrative    ** Merged History Encounter **          Allergies:   Amoxicillin  Current Medications:   Current Outpatient Medications   Medication Sig Dispense Refill     acetaminophen (TYLENOL) 650 MG CR tablet Take 1 tablet by mouth every 8 hours as needed for pain. 100 tablet 11     albuterol (PROAIR HFA/PROVENTIL HFA/VENTOLIN HFA) 108 (90 Base) MCG/ACT inhaler INL 2 PFS PO Q 4 H PRF SOB       baclofen (LIORESAL) 10 MG tablet Take 0.5 tablets (5 mg) by mouth 3 times daily for 7 days, THEN 1 tablet (10 mg) 3 times daily for 7 days, THEN 1.5 tablets (15 mg) 3 times daily. 108 tablet 1     buprenorphine-naloxone (SUBOXONE) 8-2 MG SUBL sublingual tablet Place 1 tablet under the tongue daily 12mg per day, q24hr dose, will break tabs in half, take 1.5 tab per day 45 tablet 0     gabapentin (NEURONTIN) 300 MG capsule Take 1 capsule (300 mg) by mouth 3 times daily 90 capsule 1     hydrOXYzine (VISTARIL) 50 MG capsule Take 1 capsule (50 mg) by mouth 3 times daily as needed for itching 90 capsule 1     ibuprofen (ADVIL/MOTRIN) 800 MG tablet Take 800 mg by mouth every 8 hours as needed for moderate pain       levonorgestrel (MIRENA) 20 MCG/24HR IUD 1 each by Intrauterine route once       loratadine (WAL-ITIN) 10 MG tablet Take 10 mg by mouth daily       predniSONE (DELTASONE) 20 MG tablet Take 3 tabs by mouth daily x 3 days, then 2 tabs daily x 3 days, then 1 tab daily x 3 days, then 1/2 tab daily x 3 days. (Patient not taking: Reported on 2/7/2020) 20 tablet 0     ROS: 10 point ROS negative other than as noted in the HPI    EXAM:  Vitals:    02/07/20 1135   BP: 104/60   BP Location: Right arm   Patient Position: Chair   Cuff Size: Adult Regular   Pulse: 104   Weight: 49.8 kg (109 lb 12.8 oz)    Body mass index is 17.69 kg/m .    Gen: appropriately interactive though emotional and anxious, she has scabs and open lesions distributed across her face  CV: RRR  Resp: CTAB, good effort without distress     MSK: normal gait,  symmetric movements UE & LE  Lower extremities: non-tender, no edema    Labs & Imaging:  No results found for this or any previous visit (from the past 24 hour(s)).    Lab Results   Component Value Date    PAP NIL 2019     ASSESSMENT/PLAN: Francisca Reed is a 34 year old  who presents for suboxone follow up. Very high social stress,  from her partner and children, relapse last month on Methamphetamines. I continue to have a strong suspicion of underlying Bipolar disorder. We discussed relationship between untreated/undiagnosed mental health and addiction, I believe she need treatment inpatient or outpatient, in a setting that specializes in dual diagnosis. I discussed the [facility] as an option, also reviewed the availability of the mental health emergency room at the [facility] if she is in crisis. Per her mother, there is a similar program in [place] where she lives, they will seek an assessment there. I will continue the suboxone given risk reduction, though I have expressed to her that I am unable to treat her mental health and social issues, strongly recommend a formal dual diagnosis treatment program. We reviewed risks of overdose as well suicide.     1. Opioid use disorder (H)  - buprenorphine-naloxone (SUBOXONE) 8-2 MG SUBL sublingual tablet; Place 1 tablet under the tongue daily 12mg per day, q24hr dose, will break tabs in half, take 1.5 tab per day  Dispense: 45 tablet; Refill: 0    Silvino Amado MD   2020 12:43 PM

## 2020-02-07 NOTE — NURSING NOTE
"Chief Complaint   Patient presents with     Follow Up     suboxone       Initial /60 (BP Location: Right arm, Patient Position: Chair, Cuff Size: Adult Regular)   Pulse 104   Wt 49.8 kg (109 lb 12.8 oz)   LMP  (LMP Unknown)   BMI 17.69 kg/m   Estimated body mass index is 17.69 kg/m  as calculated from the following:    Height as of 1/10/20: 1.678 m (5' 6.06\").    Weight as of this encounter: 49.8 kg (109 lb 12.8 oz).  BP completed using cuff size: regular        The following HM Due: NONE      The following patient reported/Care Every where data was sent to:  P ABSTRACT QUALITY INITIATIVES [[id number]]       Marge Silva, [facility]  2020           "

## 2020-03-01 ENCOUNTER — HEALTH MAINTENANCE LETTER (OUTPATIENT)
Age: 35
End: 2020-03-01

## 2020-03-16 ENCOUNTER — TELEPHONE (OUTPATIENT)
Dept: FAMILY MEDICINE | Facility: CLINIC | Age: 35
End: 2020-03-16

## 2020-03-16 DIAGNOSIS — S32.000S COMPRESSION FRACTURE OF LUMBAR VERTEBRA, UNSPECIFIED LUMBAR VERTEBRAL LEVEL, SEQUELA: ICD-10-CM

## 2020-03-16 DIAGNOSIS — M54.9 CHRONIC BACK PAIN, UNSPECIFIED BACK LOCATION, UNSPECIFIED BACK PAIN LATERALITY: ICD-10-CM

## 2020-03-16 DIAGNOSIS — G89.29 CHRONIC BACK PAIN, UNSPECIFIED BACK LOCATION, UNSPECIFIED BACK PAIN LATERALITY: ICD-10-CM

## 2020-03-16 RX ORDER — GABAPENTIN 300 MG/1
300 CAPSULE ORAL 3 TIMES DAILY
Qty: 90 CAPSULE | Refills: 0 | Status: SHIPPED | OUTPATIENT
Start: 2020-03-16 | End: 2020-04-09

## 2020-03-16 RX ORDER — BACLOFEN 10 MG/1
15 TABLET ORAL 3 TIMES DAILY
Qty: 135 TABLET | Refills: 0 | Status: SHIPPED | OUTPATIENT
Start: 2020-03-16 | End: 2020-04-09

## 2020-03-16 NOTE — TELEPHONE ENCOUNTER
Routing refill request to provider for review/approval because:  Drug not on the [facility] refill protocol  ?patient still taking has not followed up in clinic.

## 2020-03-16 NOTE — TELEPHONE ENCOUNTER
Routing refill request to provider for review/approval because:  Drug not on the [facility] refill protocol patient has not followed up in clinic

## 2020-03-16 NOTE — TELEPHONE ENCOUNTER
Requested Prescriptions   Pending Prescriptions Disp Refills     gabapentin (NEURONTIN) 300 MG capsule [Pharmacy Med Name: Gabapentin 300 MG Oral Capsule] 90 capsule 0     Sig: TAKE 1 CAPSULE BY MOUTH THREE TIMES DAILY   Last Written Prescription Date:  2-18-20  Last Fill Quantity: 90,  # refills: 0   Last office visit: 1/9/2020 with prescribing provider:  1-9-20   Future Office Visit:      There is no refill protocol information for this order

## 2020-03-16 NOTE — TELEPHONE ENCOUNTER
Requested Prescriptions   Pending Prescriptions Disp Refills     baclofen (LIORESAL) 10 MG tablet 108 tablet 1     Sig: Take 0.5 tablets (5 mg) by mouth 3 times daily for 7 days, THEN 1 tablet (10 mg) 3 times daily for 7 days, THEN 1.5 tablets (15 mg) 3 times daily.  Last Written Prescription Date:  1/9/20  Last Fill Quantity: 108,  # refills: 1   Last office visit: 1/9/2020 with prescribing provider:  Donna   Future Office Visit:         There is no refill protocol information for this order

## 2020-03-16 NOTE — TELEPHONE ENCOUNTER
Reason for Call:  Other prescription    Detailed comments: pt out of med: gabapentin and bacleph.  [facility].  Please refill.      Phone Number Patient can be reached at: Cell number on file:    Telephone Information:   Mobile [phone]       Best Time: any    Can we leave a detailed message on this number? YES    Call taken on 3/16/2020 at 2:24 PM by Ashley Rowe

## 2020-03-20 ENCOUNTER — OFFICE VISIT (OUTPATIENT)
Dept: OBGYN | Facility: OTHER | Age: 35
End: 2020-03-20
Payer: MEDICAID

## 2020-03-20 VITALS
HEART RATE: 109 BPM | SYSTOLIC BLOOD PRESSURE: 124 MMHG | BODY MASS INDEX: 19.81 KG/M2 | DIASTOLIC BLOOD PRESSURE: 76 MMHG | WEIGHT: 123 LBS

## 2020-03-20 DIAGNOSIS — F11.90 OPIOID USE DISORDER: ICD-10-CM

## 2020-03-20 PROCEDURE — 99214 OFFICE O/P EST MOD 30 MIN: CPT | Performed by: OBSTETRICS & GYNECOLOGY

## 2020-03-20 RX ORDER — BUPRENORPHINE HYDROCHLORIDE AND NALOXONE HYDROCHLORIDE DIHYDRATE 8; 2 MG/1; MG/1
1 TABLET SUBLINGUAL DAILY
Qty: 30 TABLET | Refills: 0 | Status: SHIPPED | OUTPATIENT
Start: 2020-03-20 | End: 2020-03-24

## 2020-03-20 NOTE — NURSING NOTE
"Chief Complaint   Patient presents with     Follow Up     Suboxone       Initial /76 (BP Location: Right arm, Patient Position: Chair, Cuff Size: Adult Regular)   Pulse 109   Wt 55.8 kg (123 lb)   LMP  (LMP Unknown)   BMI 19.81 kg/m   Estimated body mass index is 19.81 kg/m  as calculated from the following:    Height as of 1/10/20: 1.678 m (5' 6.06\").    Weight as of this encounter: 55.8 kg (123 lb).  BP completed using cuff size: regular        The following HM Due: NONE      The following patient reported/Care Every where data was sent to:  P ABSTRACT QUALITY INITIATIVES [[id number]]       Marge Silva, [facility]  2020           "

## 2020-03-20 NOTE — LETTER
[facility]  [address] [place] [address]  [place] [address]  [phone]      March 20, 2020    RE:  Francisca Reed                                                                                                                                                       [address]  [place] [address]            To whom it may concern:    Francisca Reed is under my professional care for opioid use disorder, and is currently be managed with Suboxone maintenance. I am recommending continuation of Suboxone for the foreseeable future, until a time when she is socially and economically stable, as well as having all mental health concerns well managed. She is not currently a candidate for Vivitrol given high risk for treatment failure and relapse.     Sincerely,        Silvino Amado MD

## 2020-03-20 NOTE — PROGRESS NOTES
Clinic Note    CC:    Chief Complaint   Patient presents with     Follow Up     Suboxone      HPI:  Francisca Reed is a 34 year old  who presents for suboxone follow up. At her last appt, her partner had left her, was withholding visitation of the kids from her, she had relapsed on Meth, was at a low point. She presents today looking like a new person. She is outwardly happy, no lesions on her face from meth as were present last time, she has gained weight.     She is living with two close friends who are sober and very supportive. She is having visitation with her children. She has started substance abuse treatment (3x/wk outpatient), as well as mental health therapy (weekly.) She lowered her dose of suboxone from 12mg to 8mg without withdrawal symptoms.      Strong family hx, with mother and brother with severe bipolar disorder, and another brother with MDD. See note dating  for more detailed discussion of mental health.      History: Using opioids (pills) for 10 years, first began after a back surgery, has used on an off, mostly gotten illicitly. She does have a depression hx, also relatively severe postpartum depression. She also describes a cyclic pattern with intermittent weeks of insomnia, other weeks of extreme isolation and depression.  She has now had a mental health evaluation, diagnosed with PTSD and anxiety, receiving therapy. She relapsed, used methamphetamines twice in January with stress of separation/divorce.     PMH:   Past Medical History:   Diagnosis Date     Anxiety      C section 2008     Papanicolaou smear of cervix with low grade squamous intraepithelial lesion (LGSIL) 2008 Pap: LSIL (see Care Everywhere)     PTSD (post-traumatic stress disorder)      SurgHx:   Past Surgical History:   Procedure Laterality Date     BACK SURGERY      fusion left side lower back     BREAST SURGERY  2010     broken bone      arm     C BREAST AUGMENTATION         SECTION  2008      SECTION  2017    x2     GYN SURGERY       INJECT JOINT SACROILIAC  89934     FamHx:   Family History   Problem Relation Age of Onset     Diabetes Father      Bipolar Disorder Mother      Bipolar Disorder Brother      Depression Brother      SocHx:   Social History     Socioeconomic History     Marital status:      Spouse name: None     Number of children: None     Years of education: None     Highest education level: None   Occupational History     None   Social Needs     Financial resource strain: None     Food insecurity     Worry: None     Inability: None     Transportation needs     Medical: None     Non-medical: None   Tobacco Use     Smoking status: Current Every Day Smoker     Packs/day: 0.50     Types: Cigarettes     Smokeless tobacco: Never Used   Substance and Sexual Activity     Alcohol use: Yes     Frequency: Monthly or less     Comment: rare     Drug use: Not Currently     Types: Oxycodone     Sexual activity: Yes     Partners: Male     Birth control/protection: I.U.D.     Comment: mirena - placed 2 years ago   Lifestyle     Physical activity     Days per week: None     Minutes per session: None     Stress: None   Relationships     Social connections     Talks on phone: None     Gets together: None     Attends Faith service: None     Active member of club or organization: None     Attends meetings of clubs or organizations: None     Relationship status: None     Intimate partner violence     Fear of current or ex partner: None     Emotionally abused: None     Physically abused: None     Forced sexual activity: None   Other Topics Concern     Parent/sibling w/ CABG, MI or angioplasty before 65F 55M? Not Asked   Social History Narrative    ** Merged History Encounter **          Allergies:   Amoxicillin  Current Medications:   Current Outpatient Medications   Medication Sig Dispense Refill     acetaminophen (TYLENOL) 650 MG CR tablet Take 1 tablet by mouth  every 8 hours as needed for pain. 100 tablet 11     albuterol (PROAIR HFA/PROVENTIL HFA/VENTOLIN HFA) 108 (90 Base) MCG/ACT inhaler INL 2 PFS PO Q 4 H PRF SOB       baclofen (LIORESAL) 10 MG tablet Take 1.5 tablets (15 mg) by mouth 3 times daily Due for follow up visit. 135 tablet 0     buprenorphine-naloxone (SUBOXONE) 8-2 MG SUBL sublingual tablet Place 1 tablet under the tongue daily q24hr dose 30 tablet 0     gabapentin (NEURONTIN) 300 MG capsule Take 1 capsule (300 mg) by mouth 3 times daily Due for follow up visit 90 capsule 0     hydrOXYzine (VISTARIL) 50 MG capsule Take 1 capsule (50 mg) by mouth 3 times daily as needed for itching 90 capsule 1     ibuprofen (ADVIL/MOTRIN) 800 MG tablet Take 800 mg by mouth every 8 hours as needed for moderate pain       levonorgestrel (MIRENA) 20 MCG/24HR IUD 1 each by Intrauterine route once       loratadine (WAL-ITIN) 10 MG tablet Take 10 mg by mouth daily       ROS: 10 point ROS negative other than as noted in the HPI    EXAM:  Vitals:    20 1133   BP: 124/76   BP Location: Right arm   Patient Position: Chair   Cuff Size: Adult Regular   Pulse: 109   Weight: 55.8 kg (123 lb)    Body mass index is 19.81 kg/m .    Gen: appropriately interactive, no distress   CV: RRR  Resp: CTAB, good effort without distress   MSK: normal gait, symmetric movements UE & LE  Lower extremities: non-tender, no edema    Labs & Imaging:  No results found for this or any previous visit (from the past 24 hour(s)).    Lab Results   Component Value Date    PAP NIL 2019     ASSESSMENT/PLAN: Francisca Reed is a 34 year old  woman who presents for suboxone follow up. Hx as detailed above, doing much better than prior. Strongly encouraged to continue with treatment and mental health therapy. Encouraged to continue suboxone, as this would be a very vulnerable and high risk time to withdrawal. 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     Total time spent face to face was 25 minutes. Greater than 50% of the time was spent counseling and/or coordination or care.    1. Opioid use disorder (H)  - buprenorphine-naloxone (SUBOXONE) 8-2 MG SUBL sublingual tablet; Place 1 tablet under the tongue daily q24hr dose  Dispense: 30 tablet; Refill: 0        Silvino Amado MD   3/20/2020 1:37 PM

## 2020-03-24 ENCOUNTER — TELEPHONE (OUTPATIENT)
Dept: OBGYN | Facility: OTHER | Age: 35
End: 2020-03-24

## 2020-03-24 DIAGNOSIS — F11.90 OPIOID USE DISORDER: Primary | ICD-10-CM

## 2020-03-24 RX ORDER — BUPRENORPHINE HYDROCHLORIDE AND NALOXONE HYDROCHLORIDE DIHYDRATE 8; 2 MG/1; MG/1
1 TABLET SUBLINGUAL DAILY
Qty: 30 TABLET | Refills: 0 | Status: SHIPPED | OUTPATIENT
Start: 2020-03-24 | End: 2020-03-25

## 2020-03-24 NOTE — TELEPHONE ENCOUNTER
[facility] pharmacist called and stated that rx went to [facility] and med pended please resend.

## 2020-03-25 ENCOUNTER — TELEPHONE (OUTPATIENT)
Dept: OBGYN | Facility: CLINIC | Age: 35
End: 2020-03-25

## 2020-03-25 DIAGNOSIS — F11.90 OPIOID USE DISORDER: ICD-10-CM

## 2020-03-25 RX ORDER — BUPRENORPHINE HYDROCHLORIDE AND NALOXONE HYDROCHLORIDE DIHYDRATE 8; 2 MG/1; MG/1
1 TABLET SUBLINGUAL DAILY
Qty: 30 TABLET | Refills: 0 | Status: SHIPPED | OUTPATIENT
Start: 2020-03-25 | End: 2020-04-22

## 2020-03-25 NOTE — TELEPHONE ENCOUNTER
Received fax from [facility] pharmacy in [place] stating Suboxone 8-2mg tabs were sent to wrong pharmacy.      Per chart suboxone was sent to [facility] in [place].  Called [facility] and canceled script.    Per Fax request please resent to [facility] in [place].    Marge Silva, [facility]  March 25, 2020

## 2020-03-25 NOTE — TELEPHONE ENCOUNTER
Dr. Amado sent suboxone rx to the [facility] in [place], [place].  Notified pt.    Lilo Sarah RN

## 2020-04-09 ENCOUNTER — OFFICE VISIT (OUTPATIENT)
Dept: FAMILY MEDICINE | Facility: CLINIC | Age: 35
End: 2020-04-09
Payer: MEDICAID

## 2020-04-09 VITALS
WEIGHT: 128 LBS | HEART RATE: 88 BPM | BODY MASS INDEX: 20.62 KG/M2 | SYSTOLIC BLOOD PRESSURE: 108 MMHG | RESPIRATION RATE: 16 BRPM | DIASTOLIC BLOOD PRESSURE: 64 MMHG | TEMPERATURE: 98.7 F

## 2020-04-09 DIAGNOSIS — M54.9 CHRONIC BACK PAIN, UNSPECIFIED BACK LOCATION, UNSPECIFIED BACK PAIN LATERALITY: ICD-10-CM

## 2020-04-09 DIAGNOSIS — S32.000S COMPRESSION FRACTURE OF LUMBAR VERTEBRA, UNSPECIFIED LUMBAR VERTEBRAL LEVEL, SEQUELA: ICD-10-CM

## 2020-04-09 DIAGNOSIS — G89.29 CHRONIC BACK PAIN, UNSPECIFIED BACK LOCATION, UNSPECIFIED BACK PAIN LATERALITY: ICD-10-CM

## 2020-04-09 PROCEDURE — 99214 OFFICE O/P EST MOD 30 MIN: CPT | Performed by: NURSE PRACTITIONER

## 2020-04-09 RX ORDER — GABAPENTIN 300 MG/1
600 CAPSULE ORAL 3 TIMES DAILY
Qty: 180 CAPSULE | Refills: 3 | Status: SHIPPED | OUTPATIENT
Start: 2020-04-09 | End: 2020-07-24

## 2020-04-09 RX ORDER — BACLOFEN 20 MG/1
20 TABLET ORAL 3 TIMES DAILY
Qty: 90 TABLET | Refills: 3 | Status: SHIPPED | OUTPATIENT
Start: 2020-04-09 | End: 2020-07-24

## 2020-04-09 NOTE — PATIENT INSTRUCTIONS
Patient Education     Understanding Cervical Radiculopathy    Cervical radiculopathy is irritation or inflammation of a nerve root in the neck. It causes neck pain and other symptoms that may spread into the chest or down the arm. To understand this condition, it helps to understand the parts of the spine:    Vertebrae. These are bones that stack to form the spine. The cervical spine contains the 7 vertebrae in the neck.    Disks. These are soft pads of tissue between the vertebrae. They act as shock absorbers for the spine.    The spinal canal. This is a tunnel formed within the stacked vertebrae. The spinal cord runs through this canal.    Nerves. These branch off the spinal cord. As they leave the spinal canal, nerves pass through openings between the vertebrae. The nerve root is the part of the nerve that is closest to the spinal cord.   With cervical radiculopathy, nerve roots in the neck become irritated. This leads to pain and symptoms that can travel to the nerves that go from the spinal cord down the arms and into the torso.  What causes cervical radiculopathy?  Aging, injury, poor posture, and other issues can lead to problems in the neck. These problems may then irritate nerve roots. These include:    Damage to a disk in the cervical spine. The damaged disk may then press on nearby nerve roots.    Degeneration from wear and tear, and aging. This can lead to narrowing (stenosis) of the openings between the vertebrae. The narrowed openings press on nerve roots as they leave the spinal canal.    An unstable spine. This is when a vertebra slips forward. It can then press on a nerve root.  There are other, less common causes of pressure on nerves in the neck. These include infection, cysts, and tumors.  Symptoms of cervical radiculopathy  These include:    Neck pain    Pain, numbness, tingling, or weakness that travels down the arm    Loss of neck movement    Muscle spasms  Treatment for cervical  radiculopathy  In most cases, your healthcare provider will first try treatments that help relieve symptoms. These may include:    Prescription or over-the-counter pain medicines. These help relieve pain and swelling.    Cold packs. These help reduce pain.    Resting. This involves avoiding positions and activities that increase pain.    Neck brace (cervical collar). This can help relieve inflammation and pain.    Physical therapy, including exercises and stretches. This can help decrease pain and increase movement and function.    Shots of medicinesaround the nerve roots. This is done to help relieve symptoms for a time.  In some cases, your healthcare provider may advise surgery to fix the underlying problem. This depends on the cause, the symptoms, and how long the pain has lasted.  Possible complications  Over time, an irritated and inflamed nerve may become damaged. This may lead to long-lasting (permanent) numbness or weakness. If symptoms change suddenly or get worse, be sure to let your healthcare provider know.     When to call your healthcare provider  Call your healthcare provider right away if you have any of these:    New pain or pain that gets worse    New or increasing weakness, numbness, or tingling in your arm or hand    Bowel or bladder changes   Date Last Reviewed: 3/10/2016    9925-2701 [employer]. [address] [place] [address], [place] [address]. All rights reserved. This information is not intended as a substitute for professional medical care. Always follow your healthcare professional's instructions.

## 2020-04-09 NOTE — PROGRESS NOTES
Zaid Reed is a 34 year old female who presents to clinic today for the following health issues:    HPI   Following up on: pain    Last visit this was discussed: 2020 with [place]    Progression of Symptoms:  worsening    Accompanying Signs & Symptoms: numbness and pain in hands-better, worsened pain shooting down left arm-in the last month.      Taking Medication as prescribed: yes    Side Effects:  None    Medication Helping Symptoms:  yes      Patient Active Problem List   Diagnosis     Intrauterine fetal death     CARDIOVASCULAR SCREENING; LDL GOAL LESS THAN 160     Fracture of hip (H)     Compression fx, lumbar spine (H)     Papanicolaou smear of cervix with low grade squamous intraepithelial lesion (LGSIL)     Excoriation (skin-picking) disorder     Chronic back pain, unspecified back location, unspecified back pain laterality     Tobacco use disorder     Past Surgical History:   Procedure Laterality Date     BACK SURGERY      fusion left side lower back     BREAST SURGERY  2010     broken bone  1996    arm     C BREAST AUGMENTATION        SECTION        SECTION  2017    x2     GYN SURGERY       INJECT JOINT SACROILIAC         Social History     Tobacco Use     Smoking status: Current Every Day Smoker     Packs/day: 0.50     Types: Cigarettes     Smokeless tobacco: Never Used   Substance Use Topics     Alcohol use: Yes     Frequency: Monthly or less     Comment: rare     Family History   Problem Relation Age of Onset     Diabetes Father      Bipolar Disorder Mother      Bipolar Disorder Brother      Depression Brother          Current Outpatient Medications   Medication Sig Dispense Refill     acetaminophen (TYLENOL) 650 MG CR tablet Take 1 tablet by mouth every 8 hours as needed for pain. 100 tablet 11     albuterol (PROAIR HFA/PROVENTIL HFA/VENTOLIN HFA) 108 (90 Base) MCG/ACT inhaler INL 2 PFS PO Q 4 H PRF SOB       baclofen (LIORESAL) 20 MG tablet  Take 1 tablet (20 mg) by mouth 3 times daily Due for follow up visit. 90 tablet 3     buprenorphine-naloxone (SUBOXONE) 8-2 MG SUBL sublingual tablet Place 1 tablet under the tongue daily q24hr dose 30 tablet 0     gabapentin (NEURONTIN) 300 MG capsule Take 2 capsules (600 mg) by mouth 3 times daily 180 capsule 3     hydrOXYzine (VISTARIL) 50 MG capsule Take 1 capsule (50 mg) by mouth 3 times daily as needed for itching 90 capsule 1     ibuprofen (ADVIL/MOTRIN) 800 MG tablet Take 800 mg by mouth every 8 hours as needed for moderate pain       levonorgestrel (MIRENA) 20 MCG/24HR IUD 1 each by Intrauterine route once       loratadine (WAL-ITIN) 10 MG tablet Take 10 mg by mouth daily       Allergies   Allergen Reactions     Amoxicillin      Recent Labs   Lab Test 09/09/19  1325   ALT 36   CR 0.63   GFRESTIMATED >90   GFRESTBLACK >90   POTASSIUM 4.2      BP Readings from Last 3 Encounters:   04/09/20 108/64   03/20/20 124/76   02/07/20 104/60    Wt Readings from Last 3 Encounters:   04/09/20 58.1 kg (128 lb)   03/20/20 55.8 kg (123 lb)   02/07/20 49.8 kg (109 lb 12.8 oz)                    Reviewed and updated as needed this visit by Provider  Tobacco  Allergies  Meds  Problems  Med Hx  Surg Hx  Fam Hx         Review of Systems   ROS COMP: Constitutional, HEENT, cardiovascular, pulmonary, GI, , musculoskeletal, neuro, skin, endocrine and psych systems are negative, except as otherwise noted.      Objective    /64   Pulse 88   Temp 98.7  F (37.1  C) (Tympanic)   Resp 16   Wt 58.1 kg (128 lb)   LMP  (LMP Unknown)   BMI 20.62 kg/m    Body mass index is 20.62 kg/m .  Physical Exam   GENERAL: healthy, alert and no distress  RESP: lungs clear to auscultation - no rales, rhonchi or wheezes  CV: regular rate and rhythm, normal S1 S2, no S3 or S4, no murmur, click or rub, no peripheral edema and peripheral pulses strong  MS: POSITIVE pain with rotation of neck to R, pain of left shoulder and arm with  movement and palpation  NEURO: POSITIVE for weakness of left arm with downward and upward applied pressure/ resistance and weakness of left hand grasp  PSYCH: mentation appears normal, affect normal/bright    Diagnostic Test Results:  Labs reviewed in Epic  See orders        Assessment & Plan       ICD-10-CM    1. Compression fracture of lumbar vertebra, unspecified lumbar vertebral level, sequela  S32.000S gabapentin (NEURONTIN) 300 MG capsule     baclofen (LIORESAL) 20 MG tablet   2. Chronic back pain, unspecified back location, unspecified back pain laterality  M54.9 gabapentin (NEURONTIN) 300 MG capsule    G89.29 baclofen (LIORESAL) 20 MG tablet          See Patient Instructions    Return in about 6 months (around 10/9/2020), or if symptoms worsen or fail to improve.    Marielle Thompson, NISREEN  [facility]

## 2020-04-14 ENCOUNTER — E-VISIT (OUTPATIENT)
Dept: FAMILY MEDICINE | Facility: CLINIC | Age: 35
End: 2020-04-14
Payer: MEDICAID

## 2020-04-14 DIAGNOSIS — R21 RASH: Primary | ICD-10-CM

## 2020-04-14 PROCEDURE — 99207 ZZC NON-BILLABLE SERV PER CHARTING: CPT | Performed by: NURSE PRACTITIONER

## 2020-04-15 NOTE — TELEPHONE ENCOUNTER
Provider E-Visit time total (minutes): 0    Please schedule this patient for an in clinic evaluation in [place] for her symptoms. WILDER Peña, FNP-BC

## 2020-04-21 ENCOUNTER — TELEPHONE (OUTPATIENT)
Dept: FAMILY MEDICINE | Facility: OTHER | Age: 35
End: 2020-04-21

## 2020-04-21 NOTE — TELEPHONE ENCOUNTER
Reason for Call:  Medication or medication refill:    Do you use a [facility]?  Name of the pharmacy and phone number for the current request:  [facility]    Name of the medication requested: SUBOXONE    Other request: please call patient if she needs an ov or phone visit    Can we leave a detailed message on this number? YES    Phone number patient can be reached at: Cell number on file:    Telephone Information:   Mobile [phone]       Best Time: anytime     Call taken on 4/21/2020 at 1:32 PM by Lexus Haynes

## 2020-04-21 NOTE — TELEPHONE ENCOUNTER
Suboxone was last prescribed on 3/25/2020 for 30 tablets with 0 refills.  Pt was last seen in clinic on 3/20/2020.  No plan for follow up.     Will route to Dr. Khoury to see if she is willing to send in an rx for pt or if she would like to do a phone/OV with pt first.    Lilo Sarah RN

## 2020-04-22 ENCOUNTER — TELEPHONE (OUTPATIENT)
Dept: FAMILY MEDICINE | Facility: OTHER | Age: 35
End: 2020-04-22

## 2020-04-22 DIAGNOSIS — F11.90 OPIOID USE DISORDER: ICD-10-CM

## 2020-04-22 RX ORDER — BUPRENORPHINE HYDROCHLORIDE AND NALOXONE HYDROCHLORIDE DIHYDRATE 8; 2 MG/1; MG/1
1 TABLET SUBLINGUAL DAILY
Qty: 30 TABLET | Refills: 0 | Status: SHIPPED | OUTPATIENT
Start: 2020-04-22 | End: 2020-05-15

## 2020-04-22 NOTE — TELEPHONE ENCOUNTER
Nidhi Khoury, DO  You 26 minutes ago (7:39 AM)       Will send in refill for one month. Please ensure she is overall doing well without concerns, otherwise can set up immediate phone visit. If doing well, then please set up in-person visit in one month.     Nidhi Khoury,       Left pt a detailed message regarding Dr. Khoury's message above.  Advised her to call and set up a phone visit with Dr. Khoury if she is having any concerns otherwise schedule an office visit next month prior to needing another refill.    Lilo Sarah RN

## 2020-05-15 ENCOUNTER — VIRTUAL VISIT (OUTPATIENT)
Dept: OBGYN | Facility: CLINIC | Age: 35
End: 2020-05-15
Payer: COMMERCIAL

## 2020-05-15 DIAGNOSIS — F11.90 OPIOID USE DISORDER: ICD-10-CM

## 2020-05-15 PROCEDURE — 99213 OFFICE O/P EST LOW 20 MIN: CPT | Mod: 95 | Performed by: OBSTETRICS & GYNECOLOGY

## 2020-05-15 RX ORDER — BUPRENORPHINE HYDROCHLORIDE AND NALOXONE HYDROCHLORIDE DIHYDRATE 8; 2 MG/1; MG/1
1 TABLET SUBLINGUAL DAILY
Qty: 45 TABLET | Refills: 0 | Status: SHIPPED | OUTPATIENT
Start: 2020-05-15 | End: 2020-06-11

## 2020-05-15 NOTE — PROGRESS NOTES
"Francisca Reed is a 34 year old female who is being evaluated via a billable video visit.      The patient has been notified of following:     \"This video visit will be conducted via a call between you and your physician/provider. We have found that certain health care needs can be provided without the need for an in-person physical exam.  This service lets us provide the care you need with a video conversation.  If a prescription is necessary we can send it directly to your pharmacy.  If lab work is needed we can place an order for that and you can then stop by our lab to have the test done at a later time.    Video visits are billed at different rates depending on your insurance coverage.  Please reach out to your insurance provider with any questions.    If during the course of the call the physician/provider feels a video visit is not appropriate, you will not be charged for this service.\"    Patient has given verbal consent for Video visit? Yes    How would you like to obtain your AVS? Gabriella    Patient would like the video invitation sent by: Send to e-mail at: [email or web address]    Will anyone else be joining your video visit? No        Video-Visit Details    Type of service:  Video Visit    Originating Location (pt. Location): Home    Distant Location (provider location):  [facility]     Platform used for Video Visit: Tato"

## 2020-05-15 NOTE — PROGRESS NOTES
Clinic Note    CC:    Chief Complaint   Patient presents with     Follow Up     suboxone      HPI:  Francisca Reed is a 34 year old  who presents for suboxone follow up. She is still living with a supportive friend, still receiving therapy and well as addiction counseling (though of note, skipped a couple of recent therapy appts). Her brother just committed suicide by hanging and she is struggling with grief. He had severe depression, and has been increasingly isolated with Covid.      She is experiencing withdrawal symptoms when waking, is worried that she titrated down from 12mg to 8mg too quickly and would like to return to her original dose. She has been breaking every other pill in half to get the 12mg dosing and has nearly completed her last script. She denies relapse.      Strong family hx, with mother and brother with severe bipolar disorder, and another brother with MDD. Now with recent suicide of one brother.       History: Using opioids (pills) for 10 years, first began after a back surgery, has used on an off, mostly gotten illicitly. She does have a depression hx, also relatively severe postpartum depression. She also describes a cyclic pattern with intermittent weeks of insomnia, other weeks of extreme isolation and depression.  She has now had a mental health evaluation, diagnosed with PTSD and anxiety, receiving therapy. She relapsed, used methamphetamines twice in January with stress of separation/divorce and disputed custody of children.     EXAM:  Gen: Alert, oriented, appropriately interactive, NAD    ASSESSMENT/PLAN: Francisca Reed is a 34 year old  woman who presents for suboxone follow up. Severe social stressors with history detailed above. Currently in safe housing and sober, attending therapy and counseling. She has supervised visitation with her children, waiting for a court appearance with hopes of return to normal visitation with children. I am encouraging her to continue her  efforts and have commended her success so far. She is planning to seek grief counseling given the suicide.     Total time spent by video was 10 minutes. Greater than 50% of the time was spent counseling and/or coordination or care.    1. Opioid use disorder (H)  - Returning to 12mg daily dosing  - buprenorphine-naloxone (SUBOXONE) 8-2 MG SUBL sublingual tablet; Place 1 tablet under the tongue daily 12mg q24hr dose, will take 1.5 tabs  Dispense: 45 tablet; Refill: 0    Silvino Amado MD   5/15/2020 6:52 PM

## 2020-06-11 ENCOUNTER — TELEPHONE (OUTPATIENT)
Dept: FAMILY MEDICINE | Facility: OTHER | Age: 35
End: 2020-06-11

## 2020-06-11 ENCOUNTER — OFFICE VISIT (OUTPATIENT)
Dept: OBGYN | Facility: CLINIC | Age: 35
End: 2020-06-11
Payer: COMMERCIAL

## 2020-06-11 VITALS
BODY MASS INDEX: 19.93 KG/M2 | DIASTOLIC BLOOD PRESSURE: 78 MMHG | HEIGHT: 66 IN | WEIGHT: 124 LBS | TEMPERATURE: 97.6 F | SYSTOLIC BLOOD PRESSURE: 120 MMHG

## 2020-06-11 DIAGNOSIS — F11.90 OPIOID USE DISORDER: Primary | ICD-10-CM

## 2020-06-11 DIAGNOSIS — Z00.00 WELLNESS EXAMINATION: ICD-10-CM

## 2020-06-11 DIAGNOSIS — N76.0 BV (BACTERIAL VAGINOSIS): ICD-10-CM

## 2020-06-11 DIAGNOSIS — B96.89 BV (BACTERIAL VAGINOSIS): ICD-10-CM

## 2020-06-11 DIAGNOSIS — Z11.3 ROUTINE SCREENING FOR STI (SEXUALLY TRANSMITTED INFECTION): ICD-10-CM

## 2020-06-11 LAB
ERYTHROCYTE [DISTWIDTH] IN BLOOD BY AUTOMATED COUNT: 12.5 % (ref 10–15)
HCG UR QL: NEGATIVE
HCT VFR BLD AUTO: 39.4 % (ref 35–47)
HGB BLD-MCNC: 13.1 G/DL (ref 11.7–15.7)
MCH RBC QN AUTO: 32.8 PG (ref 26.5–33)
MCHC RBC AUTO-ENTMCNC: 33.2 G/DL (ref 31.5–36.5)
MCV RBC AUTO: 99 FL (ref 78–100)
PLATELET # BLD AUTO: 186 10E9/L (ref 150–450)
RBC # BLD AUTO: 3.99 10E12/L (ref 3.8–5.2)
SPECIMEN SOURCE: ABNORMAL
TSH SERPL DL<=0.005 MIU/L-ACNC: 1.1 MU/L (ref 0.4–4)
WBC # BLD AUTO: 6.2 10E9/L (ref 4–11)
WET PREP SPEC: ABNORMAL

## 2020-06-11 PROCEDURE — 80307 DRUG TEST PRSMV CHEM ANLYZR: CPT | Mod: 90 | Performed by: OBSTETRICS & GYNECOLOGY

## 2020-06-11 PROCEDURE — 87389 HIV-1 AG W/HIV-1&-2 AB AG IA: CPT | Performed by: OBSTETRICS & GYNECOLOGY

## 2020-06-11 PROCEDURE — 86780 TREPONEMA PALLIDUM: CPT | Performed by: OBSTETRICS & GYNECOLOGY

## 2020-06-11 PROCEDURE — 99214 OFFICE O/P EST MOD 30 MIN: CPT | Performed by: OBSTETRICS & GYNECOLOGY

## 2020-06-11 PROCEDURE — 87340 HEPATITIS B SURFACE AG IA: CPT | Performed by: OBSTETRICS & GYNECOLOGY

## 2020-06-11 PROCEDURE — 99000 SPECIMEN HANDLING OFFICE-LAB: CPT | Performed by: OBSTETRICS & GYNECOLOGY

## 2020-06-11 PROCEDURE — 87591 N.GONORRHOEAE DNA AMP PROB: CPT | Performed by: OBSTETRICS & GYNECOLOGY

## 2020-06-11 PROCEDURE — 36415 COLL VENOUS BLD VENIPUNCTURE: CPT | Performed by: OBSTETRICS & GYNECOLOGY

## 2020-06-11 PROCEDURE — 87491 CHLMYD TRACH DNA AMP PROBE: CPT | Performed by: OBSTETRICS & GYNECOLOGY

## 2020-06-11 PROCEDURE — 86803 HEPATITIS C AB TEST: CPT | Performed by: OBSTETRICS & GYNECOLOGY

## 2020-06-11 PROCEDURE — 84443 ASSAY THYROID STIM HORMONE: CPT | Performed by: OBSTETRICS & GYNECOLOGY

## 2020-06-11 PROCEDURE — 85027 COMPLETE CBC AUTOMATED: CPT | Performed by: OBSTETRICS & GYNECOLOGY

## 2020-06-11 PROCEDURE — 81025 URINE PREGNANCY TEST: CPT | Performed by: OBSTETRICS & GYNECOLOGY

## 2020-06-11 PROCEDURE — 87210 SMEAR WET MOUNT SALINE/INK: CPT | Performed by: OBSTETRICS & GYNECOLOGY

## 2020-06-11 RX ORDER — BUPRENORPHINE HYDROCHLORIDE AND NALOXONE HYDROCHLORIDE DIHYDRATE 8; 2 MG/1; MG/1
1 TABLET SUBLINGUAL DAILY
Qty: 45 TABLET | Refills: 0 | Status: SHIPPED | OUTPATIENT
Start: 2020-06-11 | End: 2020-07-09

## 2020-06-11 RX ORDER — METRONIDAZOLE 500 MG/1
500 TABLET ORAL 2 TIMES DAILY
Qty: 14 TABLET | Refills: 0 | Status: SHIPPED | OUTPATIENT
Start: 2020-06-11 | End: 2020-07-09

## 2020-06-11 RX ORDER — BUPRENORPHINE AND NALOXONE 12; 3 MG/1; MG/1
1 FILM, SOLUBLE BUCCAL; SUBLINGUAL DAILY
Qty: 30 FILM | Refills: 0 | Status: SHIPPED | OUTPATIENT
Start: 2020-06-11 | End: 2020-06-11 | Stop reason: ALTCHOICE

## 2020-06-11 RX ORDER — ESCITALOPRAM OXALATE 5 MG/1
10 TABLET ORAL DAILY
COMMUNITY
Start: 2020-05-28 | End: 2021-10-28

## 2020-06-11 ASSESSMENT — MIFFLIN-ST. JEOR: SCORE: 1284.21

## 2020-06-11 NOTE — TELEPHONE ENCOUNTER
RN called and relayed to pt tablets now sent to her pharmacy.    Patient verbalized understanding and agreed to plan.   Patient was given the opportunity to ask additional questions and have them answered. Patient had no further questions.   Tamar Aldana RN on 6/11/2020 at 1:27 PM

## 2020-06-11 NOTE — PROGRESS NOTES
Clinic Note    HPI:  Francisca Reed is a 35 year old  who presents for suboxone follow up.    Moved back in with mother. Couple she had been staying with become less stable, jazmyne was drinking.     Her brother recently committed suicide by hanging and she is struggling with grief.     She is on speaking terms with  now, going through a now cooperative divorce.     Has kids every day, though not overnight. Twins overnight with their father, the two year old overnights with her recent ex. The two FOBs are on good terms with each other.     Graduated from treatment, ongoing counseling (down from 3 days to 1 day a week). Still in therapy.    IUD going well, some rare spotting, no periods.       Doing well with the 12mg of suboxone, no withdrawal symptoms. She denies relapse.      Strong family hx, with mother and brother with severe bipolar disorder, and another brother with MDD. Now with recent suicide of one brother.       History: Using opioids (pills) for 10 years, first began after a back surgery, has used on an off, mostly gotten illicitly. She does have a depression hx, also relatively severe postpartum depression. She also describes a cyclic pattern with intermittent weeks of insomnia, other weeks of extreme isolation and depression.  She has now had a mental health evaluation, diagnosed with PTSD and anxiety, receiving therapy. She relapsed, used methamphetamines twice in January with stress of separation/divorce and disputed custody of children.     Ob Hx:  s/p CS x2 including 36w twins  Gyn Hx: No LMP recorded. (Menstrual status: IUD).  Menses suppressed with IUD   Last pap PAP NIL, HPV- (), history of ASCUS, HPV+ (), hx of Colposcopy x2   STI screening negative ()   Using IUD for birth control  PMH:   Past Medical History:   Diagnosis Date     Anxiety      C section 2008     Papanicolaou smear of cervix with low grade squamous intraepithelial lesion (LGSIL) 2008     2008 Pap: LSIL (see Care Everywhere)     PTSD (post-traumatic stress disorder)      SurgHx:   Past Surgical History:   Procedure Laterality Date     BACK SURGERY  2011    fusion left side lower back     BREAST SURGERY  2010     broken bone  1996    arm     C BREAST AUGMENTATION  2010      SECTION  2008      SECTION  2017    x2     GYN SURGERY       INJECT JOINT SACROILIAC       FamHx:   Family History   Problem Relation Age of Onset     Diabetes Father      Bipolar Disorder Mother      Bipolar Disorder Brother      Depression Brother      SocHx:   Social History     Socioeconomic History     Marital status:      Spouse name: Not on file     Number of children: Not on file     Years of education: Not on file     Highest education level: Not on file   Occupational History     Not on file   Social Needs     Financial resource strain: Not on file     Food insecurity     Worry: Not on file     Inability: Not on file     Transportation needs     Medical: Not on file     Non-medical: Not on file   Tobacco Use     Smoking status: Current Every Day Smoker     Packs/day: 0.50     Types: Cigarettes     Smokeless tobacco: Never Used   Substance and Sexual Activity     Alcohol use: Yes     Frequency: Monthly or less     Comment: rare     Drug use: Not Currently     Types: Oxycodone     Sexual activity: Yes     Partners: Male     Birth control/protection: I.U.D.     Comment: mirena - placed 2 years ago   Lifestyle     Physical activity     Days per week: Not on file     Minutes per session: Not on file     Stress: Not on file   Relationships     Social connections     Talks on phone: Not on file     Gets together: Not on file     Attends Alevism service: Not on file     Active member of club or organization: Not on file     Attends meetings of clubs or organizations: Not on file     Relationship status: Not on file     Intimate partner violence     Fear of current or ex partner: Not on file  "    Emotionally abused: Not on file     Physically abused: Not on file     Forced sexual activity: Not on file   Other Topics Concern     Parent/sibling w/ CABG, MI or angioplasty before 65F 55M? Not Asked   Social History Narrative    ** Merged History Encounter **          Allergies:   Amoxicillin  Current Medications:   Current Outpatient Medications   Medication Sig Dispense Refill     acetaminophen (TYLENOL) 650 MG CR tablet Take 1 tablet by mouth every 8 hours as needed for pain. 100 tablet 11     albuterol (PROAIR HFA/PROVENTIL HFA/VENTOLIN HFA) 108 (90 Base) MCG/ACT inhaler INL 2 PFS PO Q 4 H PRF SOB       baclofen (LIORESAL) 20 MG tablet Take 1 tablet (20 mg) by mouth 3 times daily Due for follow up visit. 90 tablet 3     buprenorphine-naloxone (SUBOXONE) 8-2 MG SUBL sublingual tablet Place 1 tablet under the tongue daily 12mg q24hr dose, will take 1.5 tabs 45 tablet 0     gabapentin (NEURONTIN) 300 MG capsule Take 2 capsules (600 mg) by mouth 3 times daily 180 capsule 3     hydrOXYzine (VISTARIL) 50 MG capsule Take 1 capsule (50 mg) by mouth 3 times daily as needed for itching 90 capsule 1     ibuprofen (ADVIL/MOTRIN) 800 MG tablet Take 800 mg by mouth every 8 hours as needed for moderate pain       levonorgestrel (MIRENA) 20 MCG/24HR IUD 1 each by Intrauterine route once       loratadine (WAL-ITIN) 10 MG tablet Take 10 mg by mouth daily       ROS: 10 point ROS negative other than as noted in the HPI    EXAM:  Vitals:    06/11/20 1045   BP: 120/78   Temp: 97.6  F (36.4  C)   TempSrc: Temporal   Weight: 56.2 kg (124 lb)   Height: 1.676 m (5' 6\")    There is no height or weight on file to calculate BMI.    Gen: Alert, oriented, appropriately interactive, NAD  CV: RRR  Resp: CTAB, good effort without distress   Abdomen: soft, non tender, non distended, no masses  Perineum: no lesions; normal appearing external genitalia, bartholins glands, urethra, skenes glands  Vagina: no masses or lesions or discharge, " normally rugated.  Cervix: no masses or lesions or discharge   Bimanual exam:   Nontender pelvic floor muscles  Uterus: midline, anteverted, small, mobile  no masses, non-tender  Adnexa: no masses or tenderness appreciated   No cervical motion tenderness  MSK: normal gait, symmetric movements UE & LE  Lower extremities: non-tender, no edema    Labs & Imaging:  Results for orders placed or performed in visit on 20 (from the past 24 hour(s))   HCG Qual, Urine ([id number])   Result Value Ref Range    HCG Qual Urine Negative NEG^Negative   Wet prep    Specimen: Vagina   Result Value Ref Range    Specimen Description Vagina     Wet Prep No Trichomonas seen     Wet Prep Moderate  Clue cells seen   (A)     Wet Prep No yeast seen     Wet Prep No PMNs seen    CBC with platelets   Result Value Ref Range    WBC 6.2 4.0 - 11.0 10e9/L    RBC Count 3.99 3.8 - 5.2 10e12/L    Hemoglobin 13.1 11.7 - 15.7 g/dL    Hematocrit 39.4 35.0 - 47.0 %    MCV 99 78 - 100 fl    MCH 32.8 26.5 - 33.0 pg    MCHC 33.2 31.5 - 36.5 g/dL    RDW 12.5 10.0 - 15.0 %    Platelet Count 186 150 - 450 10e9/L   TSH with free T4 reflex   Result Value Ref Range    TSH 1.10 0.40 - 4.00 mU/L       Lab Results   Component Value Date    PAP NIL 2019     Francisca Reed is a 35 year old  woman who presents for suboxone follow up. Severe social stressors with history detailed above. Currently in safe housing (with mother) and sober, attending therapy and counseling. She has supervised visitation with her children daily.    Total time spent face to face was 25 minutes. Greater than 50% of the time was spent counseling and/or coordination or care.    1. Routine screening for STI (sexually transmitted infection)  - Recent new sexual partner  - Wet prep  - Neisseria gonorrhoeae PCR  - Chlamydia trachomatis PCR  - HIV Antigen Antibody Combo  - Hepatitis C antibody  - Hepatitis B surface antigen  - Treponema Abs w Reflex to RPR and Titer  - HCG Qual, Urine  ([id number])    2. Wellness examination  - CBC with platelets  - TSH with free T4 reflex    3. Opioid use disorder (H)  - Drug  Screen Comprehensive, Urine w/o Reported Meds (Pain Care Package)  - buprenorphine-naloxone (SUBOXONE) 8-2 MG SUBL sublingual tablet; Place 1 tablet under the tongue daily 12mg q24hr dose, will take 1.5 tabs  Dispense: 45 tablet; Refill: 0    4. BV (bacterial vaginosis)  - metroNIDAZOLE (FLAGYL) 500 MG tablet; Take 1 tablet (500 mg) by mouth 2 times daily for 7 days  Dispense: 14 tablet; Refill: 0    Silvino Amado MD   6/11/2020 9:55 AM

## 2020-06-11 NOTE — TELEPHONE ENCOUNTER
Pt states her suboxone rx that was sent to her pharmacy today was wrong.  She states she usually gets the suboxone tablets but the films were prescribed today.     She is requesting another rx be sent to her pharmacy for the suboxone tablets as the films hurt her mouth.  She would like this done ASAP.    Will route to Dr. Amado.    Lilo Sarah RN

## 2020-06-11 NOTE — NURSING NOTE
"Chief Complaint   Patient presents with     Follow Up     suboxone       Initial There were no vitals taken for this visit. Estimated body mass index is 20.62 kg/m  as calculated from the following:    Height as of 1/10/20: 1.678 m (5' 6.06\").    Weight as of 20: 58.1 kg (128 lb).  BP completed using cuff size: regular        The following HM Due:       The following patient reported/Care Every where data was sent to:  P ABSTRACT QUALITY INITIATIVES [[id number]]       Marge Silva, [facility]  2020           "

## 2020-06-16 LAB — COMPREHEN DRUG ANALYSIS UR: NORMAL

## 2020-07-09 ENCOUNTER — OFFICE VISIT (OUTPATIENT)
Dept: OBGYN | Facility: CLINIC | Age: 35
End: 2020-07-09
Payer: COMMERCIAL

## 2020-07-09 ENCOUNTER — PATIENT OUTREACH (OUTPATIENT)
Dept: CARE COORDINATION | Facility: CLINIC | Age: 35
End: 2020-07-09

## 2020-07-09 VITALS
BODY MASS INDEX: 17.85 KG/M2 | TEMPERATURE: 97.8 F | HEART RATE: 118 BPM | SYSTOLIC BLOOD PRESSURE: 120 MMHG | DIASTOLIC BLOOD PRESSURE: 80 MMHG | WEIGHT: 110.6 LBS

## 2020-07-09 DIAGNOSIS — G62.9 PERIPHERAL POLYNEUROPATHY: ICD-10-CM

## 2020-07-09 DIAGNOSIS — F11.90 OPIOID USE DISORDER: Primary | ICD-10-CM

## 2020-07-09 PROCEDURE — 99000 SPECIMEN HANDLING OFFICE-LAB: CPT | Performed by: OBSTETRICS & GYNECOLOGY

## 2020-07-09 PROCEDURE — 80307 DRUG TEST PRSMV CHEM ANLYZR: CPT | Mod: 90 | Performed by: OBSTETRICS & GYNECOLOGY

## 2020-07-09 PROCEDURE — 99214 OFFICE O/P EST MOD 30 MIN: CPT | Performed by: OBSTETRICS & GYNECOLOGY

## 2020-07-09 RX ORDER — BUPRENORPHINE HYDROCHLORIDE AND NALOXONE HYDROCHLORIDE DIHYDRATE 8; 2 MG/1; MG/1
1 TABLET SUBLINGUAL DAILY
Qty: 45 TABLET | Refills: 0 | Status: SHIPPED | OUTPATIENT
Start: 2020-07-09 | End: 2020-09-08

## 2020-07-09 NOTE — NURSING NOTE
"Chief Complaint   Patient presents with     Follow Up     Suboxone       Initial LMP  (LMP Unknown)  Estimated body mass index is 20.01 kg/m  as calculated from the following:    Height as of 20: 1.676 m (5' 6\").    Weight as of 20: 56.2 kg (124 lb).  BP completed using cuff size: regular        The following HM Due: NONE      The following patient reported/Care Every where data was sent to:  P ABSTRACT QUALITY INITIATIVES [[id number]]       Marge Silva, [facility]  2020           "

## 2020-07-09 NOTE — PROGRESS NOTES
Clinic Note    CC:    Chief Complaint   Patient presents with     Follow Up     Suboxone        HPI:  Francisca Reed is a 35 year old  being see for f/u for OUD    Has been very stressed, not eating, high anxiety, sleep difficulties, fatigue, reports relapsed on meth, also lost some of her suboxone, taking 1/4 tabs, withdrawing currently.     Has working relationship with , seeing her children regularly.   No longer currently participating in treatment, wants to transfer to a program in [place] vs another program    She reports intermittent loss of strength and sensation of right upper and lower distal extremities, loss of sensation in left upper extremity. Right hand and foot will involuntary pronate, she is unable to grasp. Will gradually recover after a few days. Present for the past 6 months though getting more frequent. She is worried about MS.     Strong family hx mood disorders, with mother and brother with severe bipolar disorder, and another brother with MDD. Now with recent suicide of one brother.       History of substance abuse: Using opioids (pills) for 10 years, first began after a back surgery, has used on an off, mostly gotten illicitly. She does have a depression hx, also relatively severe postpartum depression. She describes a cyclic pattern with intermittent weeks of insomnia, other weeks of extreme isolation and depression.  She has now had a mental health evaluation, diagnosed with PTSD and anxiety. She relapsed, used methamphetamines twice in January with stress of separation/divorce and disputed custody of children. Current second relapse with meth. UDS wnl , rpt pending now.     PMH:   Past Medical History:   Diagnosis Date     Anxiety      C section 2008     Papanicolaou smear of cervix with low grade squamous intraepithelial lesion (LGSIL) 2008 Pap: LSIL (see Care Everywhere)     PTSD (post-traumatic stress disorder)      SurgHx:   Past Surgical  History:   Procedure Laterality Date     BACK SURGERY  2011    fusion left side lower back     BREAST SURGERY  2010     broken bone  1996    arm     C BREAST AUGMENTATION  2010      SECTION  2008      SECTION  2017    x2     GYN SURGERY       INJECT JOINT SACROILIAC  [id number]     FamHx:   Family History   Problem Relation Age of Onset     Diabetes Father      Bipolar Disorder Mother      Bipolar Disorder Brother      Depression Brother      SocHx:   Social History     Socioeconomic History     Marital status:      Spouse name: None     Number of children: None     Years of education: None     Highest education level: None   Occupational History     None   Social Needs     Financial resource strain: None     Food insecurity     Worry: None     Inability: None     Transportation needs     Medical: None     Non-medical: None   Tobacco Use     Smoking status: Current Every Day Smoker     Packs/day: 0.50     Types: Cigarettes     Smokeless tobacco: Never Used   Substance and Sexual Activity     Alcohol use: Yes     Frequency: Monthly or less     Comment: rare     Drug use: Not Currently     Types: Oxycodone     Sexual activity: Yes     Partners: Male     Birth control/protection: I.U.D.     Comment: mirena - placed 2 years ago as of    Lifestyle     Physical activity     Days per week: None     Minutes per session: None     Stress: None   Relationships     Social connections     Talks on phone: None     Gets together: None     Attends Congregational service: None     Active member of club or organization: None     Attends meetings of clubs or organizations: None     Relationship status: None     Intimate partner violence     Fear of current or ex partner: None     Emotionally abused: None     Physically abused: None     Forced sexual activity: None   Other Topics Concern     Parent/sibling w/ CABG, MI or angioplasty before 65F 55M? Not Asked   Social History Narrative    ** Merged History Encounter  **          Allergies:   Amoxicillin  Current Medications:   Current Outpatient Medications   Medication Sig Dispense Refill     acetaminophen (TYLENOL) 650 MG CR tablet Take 1 tablet by mouth every 8 hours as needed for pain. 100 tablet 11     albuterol (PROAIR HFA/PROVENTIL HFA/VENTOLIN HFA) 108 (90 Base) MCG/ACT inhaler INL 2 PFS PO Q 4 H PRF SOB       baclofen (LIORESAL) 20 MG tablet Take 1 tablet (20 mg) by mouth 3 times daily Due for follow up visit. 90 tablet 3     buprenorphine-naloxone (SUBOXONE) 8-2 MG SUBL sublingual tablet Place 1 tablet under the tongue daily 12mg q24hr dose, will take 1.5 tabs 45 tablet 0     escitalopram (LEXAPRO) 5 MG tablet        gabapentin (NEURONTIN) 300 MG capsule Take 2 capsules (600 mg) by mouth 3 times daily 180 capsule 3     hydrOXYzine (VISTARIL) 50 MG capsule Take 1 capsule (50 mg) by mouth 3 times daily as needed for itching 90 capsule 1     ibuprofen (ADVIL/MOTRIN) 800 MG tablet Take 800 mg by mouth every 8 hours as needed for moderate pain       levonorgestrel (MIRENA) 20 MCG/24HR IUD 1 each by Intrauterine route once       loratadine (WAL-ITIN) 10 MG tablet Take 10 mg by mouth daily       ROS: 10 point ROS negative other than as noted in the HPI    EXAM:  Vitals:    20 1133   BP: 120/80   BP Location: Right arm   Patient Position: Chair   Cuff Size: Adult Regular   Pulse: 118   Temp: 97.8  F (36.6  C)   TempSrc: Temporal   Weight: 50.2 kg (110 lb 9.6 oz)    Body mass index is 17.85 kg/m .    Gen: Alert, oriented, appropriately interactive, NAD  CV: RRR  Resp: CTAB, good effort without distress   Abdomen: soft, non tender, non distended  MSK: right hand pronated, weak , reduced abduction and adduction of right wrist, strength superior to the wrists normal bilaterally     Labs & Imaging:  GC/Chlam-, HIV-, Hep C-, Hep B-, Trep-, Hgb & TSH wnl (20)    ASSESSMENT/PLAN: Francisca Reed is a 35 year old  woman with history as detailed above, being seen for  OUD, currently doing poorly, relapsed on meth, significant weight loss/malnurishment, high anxiety, new neurological symptoms. She will see neurology, advised to go to an ED if symptoms get any worse prior to neurology consult. Regarding addiction, has current active rule 25, strongly encouraged to re-enroll in treatment, would recommend inpatient. SW consult ordered, information given for [facility] as a possibly appropriate duel diagnosis treatment center for women. I do not feel that outpatient treatment would be adequate at present.     1. Peripheral polyneuropathy  - NEUROLOGY ADULT REFERRAL    2. Opioid use disorder (H)  - CARE COORDINATION REFERRAL  - Drug  Screen Comprehensive, Urine w/o Reported Meds (Pain Care Package)  - buprenorphine-naloxone (SUBOXONE) 8-2 MG SUBL sublingual tablet; Place 1 tablet under the tongue daily 12mg q24hr dose, will take 1.5 tabs  Dispense: 45 tablet; Refill: 0    Silvino Amado MD   7/9/2020 11:42 AM

## 2020-07-09 NOTE — PROGRESS NOTES
Clinic Care Coordination Contact  Community Health Worker Initial Outreach    Patient accepts CC: Yes. Patient scheduled for assessment with  CC on 7/10 at 1pm. Patient noted desire to discuss options for treatment in [place] and other support/resources that could benefit patient. .     The Clinic Community Health Worker spoke with the patient today to discuss possible Clinic Care Coordination enrollment. The service was described to the patient and immediate needs were discussed to include contact numbers for Neurology at [place] and [facility] as referred by Dr. Amado at today's appt. The patient agreed to [facility] enrollment and an assessment was scheduled. The patient was provided with contact information for the clinic CHW.             Assessment date: 7/10 @1pm  [place]- [phone] main number  [place] [phone]     Reason for Referral: Addiction, seeking treatment referrals, specifically for dual diagnosis if available   Clinical Staff have discussed the Care Coordination Referral with the patient and/or caregiver: yes    Thi COLBERT Community Health Worker  [facility] Care Coordination  [facility] [place]  Phone: [phone]

## 2020-07-10 ENCOUNTER — PATIENT OUTREACH (OUTPATIENT)
Dept: NURSING | Facility: CLINIC | Age: 35
End: 2020-07-10
Attending: OBSTETRICS & GYNECOLOGY
Payer: COMMERCIAL

## 2020-07-10 SDOH — SOCIAL STABILITY: SOCIAL INSECURITY
WITHIN THE LAST YEAR, HAVE YOU BEEN KICKED, HIT, SLAPPED, OR OTHERWISE PHYSICALLY HURT BY YOUR PARTNER OR EX-PARTNER?: NO

## 2020-07-10 SDOH — SOCIAL STABILITY: SOCIAL NETWORK: HOW OFTEN DO YOU ATTEND CHURCH OR RELIGIOUS SERVICES?: MORE THAN 4 TIMES PER YEAR

## 2020-07-10 SDOH — HEALTH STABILITY: MENTAL HEALTH: HOW OFTEN DO YOU HAVE A DRINK CONTAINING ALCOHOL?: NOT ASKED

## 2020-07-10 SDOH — SOCIAL STABILITY: SOCIAL NETWORK: ARE YOU MARRIED, WIDOWED, DIVORCED, SEPARATED, NEVER MARRIED, OR LIVING WITH A PARTNER?: MARRIED

## 2020-07-10 SDOH — SOCIAL STABILITY: SOCIAL INSECURITY
WITHIN THE LAST YEAR, HAVE TO BEEN RAPED OR FORCED TO HAVE ANY KIND OF SEXUAL ACTIVITY BY YOUR PARTNER OR EX-PARTNER?: NO

## 2020-07-10 SDOH — ECONOMIC STABILITY: FOOD INSECURITY: WITHIN THE PAST 12 MONTHS, THE FOOD YOU BOUGHT JUST DIDN'T LAST AND YOU DIDN'T HAVE MONEY TO GET MORE.: NEVER TRUE

## 2020-07-10 SDOH — ECONOMIC STABILITY: TRANSPORTATION INSECURITY
IN THE PAST 12 MONTHS, HAS THE LACK OF TRANSPORTATION KEPT YOU FROM MEDICAL APPOINTMENTS OR FROM GETTING MEDICATIONS?: NO

## 2020-07-10 SDOH — ECONOMIC STABILITY: TRANSPORTATION INSECURITY
IN THE PAST 12 MONTHS, HAS LACK OF TRANSPORTATION KEPT YOU FROM MEETINGS, WORK, OR FROM GETTING THINGS NEEDED FOR DAILY LIVING?: NO

## 2020-07-10 SDOH — ECONOMIC STABILITY: INCOME INSECURITY: HOW HARD IS IT FOR YOU TO PAY FOR THE VERY BASICS LIKE FOOD, HOUSING, MEDICAL CARE, AND HEATING?: VERY HARD

## 2020-07-10 SDOH — SOCIAL STABILITY: SOCIAL NETWORK: HOW OFTEN DO YOU GET TOGETHER WITH FRIENDS OR RELATIVES?: MORE THAN THREE TIMES A WEEK

## 2020-07-10 SDOH — HEALTH STABILITY: PHYSICAL HEALTH: ON AVERAGE, HOW MANY DAYS PER WEEK DO YOU ENGAGE IN MODERATE TO STRENUOUS EXERCISE (LIKE A BRISK WALK)?: 1 DAY

## 2020-07-10 SDOH — HEALTH STABILITY: MENTAL HEALTH
STRESS IS WHEN SOMEONE FEELS TENSE, NERVOUS, ANXIOUS, OR CAN'T SLEEP AT NIGHT BECAUSE THEIR MIND IS TROUBLED. HOW STRESSED ARE YOU?: VERY MUCH

## 2020-07-10 SDOH — SOCIAL STABILITY: SOCIAL NETWORK
IN A TYPICAL WEEK, HOW MANY TIMES DO YOU TALK ON THE PHONE WITH FAMILY, FRIENDS, OR NEIGHBORS?: MORE THAN THREE TIMES A WEEK

## 2020-07-10 SDOH — SOCIAL STABILITY: SOCIAL NETWORK: HOW OFTEN DO YOU ATTENT MEETINGS OF THE CLUB OR ORGANIZATION YOU BELONG TO?: NEVER

## 2020-07-10 SDOH — SOCIAL STABILITY: SOCIAL INSECURITY: WITHIN THE LAST YEAR, HAVE YOU BEEN AFRAID OF YOUR PARTNER OR EX-PARTNER?: NO

## 2020-07-10 SDOH — HEALTH STABILITY: PHYSICAL HEALTH: ON AVERAGE, HOW MANY MINUTES DO YOU ENGAGE IN EXERCISE AT THIS LEVEL?: 30 MIN

## 2020-07-10 SDOH — SOCIAL STABILITY: SOCIAL INSECURITY: WITHIN THE LAST YEAR, HAVE YOU BEEN HUMILIATED OR EMOTIONALLY ABUSED IN OTHER WAYS BY YOUR PARTNER OR EX-PARTNER?: NO

## 2020-07-10 SDOH — SOCIAL STABILITY: SOCIAL NETWORK
DO YOU BELONG TO ANY CLUBS OR ORGANIZATIONS SUCH AS CHURCH GROUPS UNIONS, FRATERNAL OR ATHLETIC GROUPS, OR SCHOOL GROUPS?: NO

## 2020-07-10 SDOH — ECONOMIC STABILITY: FOOD INSECURITY: WITHIN THE PAST 12 MONTHS, YOU WORRIED THAT YOUR FOOD WOULD RUN OUT BEFORE YOU GOT MONEY TO BUY MORE.: NEVER TRUE

## 2020-07-10 ASSESSMENT — ACTIVITIES OF DAILY LIVING (ADL): DEPENDENT_IADLS:: INDEPENDENT

## 2020-07-10 NOTE — PROGRESS NOTES
Clinic Care Coordination Contact    Clinic Care Coordination Contact  OUTREACH    Referral Information:  Referral Source: Specialist    Primary Diagnosis: Dual -  MH/CD    Chief Complaint   Patient presents with     Clinic Care Coordination - Initial             Universal Utilization: Patient has 3 missed appointments in the last month.  She identifies them as memory concerns or that she is anxious about the appointment and does not go.  Clinic Utilization  Difficulty keeping appointments:: Yes  Compliance Concerns: No  No-Show Concerns: Yes  No PCP office visit in Past Year: No  Utilization    Last refreshed: 7/10/2020  2:15 PM:  Hospital Admissions 0           Last refreshed: 7/10/2020  2:15 PM:  ED Visits 0           Last refreshed: 7/10/2020  2:15 PM:  No Show Count (past year) 8              Current as of: 7/10/2020  2:15 PM              Clinical Concerns:  Current Medical Concerns: Patient has pain and numbness in multiple areas affecting multiple limbs and areas of the body.  She does have a neurology appointment on 8/6/2020  Current Behavioral Concerns: Patient has depression, anxiety, and PTSD per chart review and problem list.  She also identifies amphetamine use in the last month but prior to 2-1/2 weeks.  Patient has a counselor and a psychiatrist and has gone to chemical dependency treatment.  She also has had a rule 25 assessment completed which indicates outpatient treatment and she would like to go to a program around the billing area.  She did recently finish 1 in [place].  Education Provided to patient: Discussed the importance of following up with appointments and recommended tests.  Patient noted that her memory and anxiety are contributing factors to her missed appointments.  Goal was set for patient to attend her appointment and follow recommendations for tests so she can get answers.    Patient feels that some of her mental health is related to her neurological condition.  She does feel that  she has supports for her mental health and why she wanted to focus the first plan/goal on attending appointment.    Patient does have a counselor and psychiatrist yet she is looking for one in the [facility] as she will be moving there in the next few months.  Provided her with the phone numbers for [facility] and behavioral health providers to assist with scheduling.    Patient reports past narcotic use and chart review indicates some of these she got through none provider ordered avenues.  She also reported amphetamine use yet denies use in the last 2-1/2 weeks.    Pain  Pain (GOAL):: Yes  Type: Acute on Chronic  Location of chronic pain:: lower back, shoulder, hands hips, etc  Radiating: No  Progression: Worsening  Description of pain: Numb, Stabbing  Chronic pain severity:: 5  Limitation of routine activities due to chronic pain:: Yes  Description: Able to do light housework  Alleviating Factors: Other(nothing)  Aggravating Factors: Activity  Health Maintenance Reviewed: Due/Overdue   Health Maintenance Due   Topic Date Due     PNEUMOCOCCAL IMMUNIZATION 19-64 MEDIUM RISK (1 of 1 - PPSV23) 06/09/2006     PREVENTIVE CARE VISIT  02/08/2012       Clinical Pathway: None    Medication Management:  Patient denies any consistent missed doses.  She denied that she had any questions about how to take her meds or about her medications other than wondering if they are the right medications.    Encouraged follow-up with providers with her questions to ensure she is getting the right medications.    Functional Status:  Dependent ADLs:: Independent  Dependent IADLs:: Independent(potential with neurological)  Bed or wheelchair confined:: No  Mobility Status: Independent  Fallen 2 or more times in the past year?: Yes  Any fall with injury in the past year?: Yes    Living Situation:  Current living arrangement:: I live in a private home with family  Type of residence:: Private home - stairs    Lifestyle &  Psychosocial Needs:  Lifestyle     Physical activity     Days per week: 1 day     Minutes per session: 30 min     Stress: Very much     Social Needs     Financial resource strain: Very hard     Food insecurity     Worry: Never true     Inability: Never true     Transportation needs     Medical: No     Non-medical: No     Diet:: Regular  Inadequate nutrition (GOAL):: No  Tube Feeding: No  Inadequate activity/exercise (GOAL):: Yes  Significant changes in sleep pattern (GOAL): Yes  Transportation means:: Regular car     Zoroastrianism or spiritual beliefs that impact treatment:: No  Mental health DX:: Yes  Mental health DX how managed:: Medication, Outpatient Counseling, Psychiatrist  Mental health management concern (GOAL):: Yes  Informal Support system:: Family, Spouse, Parent, Children, Friends   Socioeconomic History     Marital status:      Spouse name: Not on file     Number of children: 5     Years of education: some college     Highest education level: Some college, no degree   Relationships     Social connections     Talks on phone: More than three times a week     Gets together: More than three times a week     Attends Buddhist service: More than 4 times per year     Active member of club or organization: No     Attends meetings of clubs or organizations: Never     Relationship status:      Intimate partner violence     Fear of current or ex partner: No     Emotionally abused: No     Physically abused: No     Forced sexual activity: No     Tobacco Use     Smoking status: Current Every Day Smoker     Packs/day: 0.50     Types: Cigarettes     Smokeless tobacco: Never Used   Substance and Sexual Activity     Alcohol use: Not Currently     Comment: rare     Drug use: Not Currently     Types: Oxycodone, Amphetamines     Comment: recovering, last use 2.5 weeks befire 7/10/2020     Sexual activity: Yes     Partners: Male     Birth control/protection: I.U.D.     Comment: mirena - placed 2 years ago as of  9/19          Patient notes some financial challenges as she is not working.  She is currently living with her parents up in [place] as she has been  from her spouse.  Her plan is to move back in with her  within the next 3 months.  She has 5 children between her and her  which is 2 sets of twins and another child, all boys.    Patient denied any concerns with obtaining food and with housing.  She said she is trying to gain some weight yet denied that the weight loss was due to inability to get food.  She notes some challenges with some activities due to the numbness and neurological difficulties she is having.     Resources and Interventions:  Current Resources:      Community Resources: None  Supplies used at home:: None  Equipment Currently Used at Home: none    Advance Care Plan/Directive  Advanced Care Plans/Directives on file:: No  Advanced Care Plan/Directive Status: Considering Options    Referrals Placed: Behavioral Health Providers, MANASA     Goals:   Goals        General    Improve chronic symptoms (pt-stated)     Notes - Note created  7/10/2020  2:16 PM by Anne Bro LSW    Goal Statement: I will attend my neurology appointment and follow up tests.   Date Goal set: 7/10/2020   Barriers: memory, anxiety, motivation  Strengths: I want to understand and improve situation  Date to Achieve By: 1/6/21   Patient expressed understanding of goal: yes  Action steps to achieve this goal:  1. I will attend my neurology appointment  2. I will follow up on recommendations tests                Patient/Caregiver understanding: To attend the appointment and all follow-up tests.    Outreach Frequency: 6 weeks  Future Appointments              In 3 weeks Vickie Patino MD [facility]          Plan: Patient to attend appointments as scheduled and follow-up tests that are recommended.  Patient to call to schedule counseling and look into additional chemical  dependency resources.   to send complex care plan via my chart and honoring choices resources via mail.  Community health worker to follow-up in about 1 month and  to review chart and follow-up as needed in 6 weeks.    ELENA Reyna, [facility] - Care Coordinator   [facility]  7/10/2020   2:39 PM  [phone]

## 2020-07-10 NOTE — LETTER
[facility] CARE COORDINATION - [facility]  [address]  [place] [address]  [phone]    July 10, 2020    Francisca Reed  [address] [address]      Dear Francisca,    I am a clinic care coordinator who works with Marielle Thompson NP at [facility]. I wanted to thank you for spending the time to talk with me.  Below is a description of clinic care coordination and how I can further assist you.      The clinic care coordination team is made up of a registered nurse,  and community health worker who understand the health care system. The goal of clinic care coordination is to help you manage your health and improve access to the health care system in the most efficient manner. The team can assist you in meeting your health care goals by providing education, coordinating services, strengthening the communication among your providers and supporting you with any resource needs.    The honoring choices form will come in the mail as I can not  it to this letter.    Please feel free to contact Thi Joyce the Community Health Worker at [phone] or myself at [phone] with any questions or concerns. We are focused on providing you with the highest-quality healthcare experience possible and that all starts with you.     Sincerely,     Anne Bro KATHRIN  [facility] - Care Coordination  [facility]   [phone]    Enclosed: I have enclosed a copy of the Complex Care Plan. This has helpful information and goals that we have talked about. Please keep this in an easy to access place to use as needed.

## 2020-07-10 NOTE — LETTER
[facility]  Complex Care Plan  About Me:    Patient Name:  Francisca Reed    YOB: 1985  Age:         35 year old   [place] MRN:    [id number] Telephone Information:  Home Phone [phone]   Mobile [phone]       Address:  [address] [place], [place] [address] Email address:  [email or web address]      Emergency Contact(s)    Name Relationship Lgl Grd Work Phone Home Phone Mobile Phone   1. BHANU WILSON Father   [phone]    2. DENIS BISHOP Mother   [phone]    3. RYAN REED Spouse   [phone]            Primary language:  English     needed? No   [place] [facility]:  [phone] op. 1  Other communication barriers: Other(memory and forgetful)  Preferred Method of Communication:     Current living arrangement: I live in a private home with family  Mobility Status/ Medical Equipment: Independent    Health Maintenance  Health Maintenance Reviewed: Due/Overdue   Health Maintenance Due   Topic Date Due     PNEUMOCOCCAL IMMUNIZATION 19-64 MEDIUM RISK (1 of 1 - PPSV23) 06/09/2006     PREVENTIVE CARE VISIT  02/08/2012     Please talk with your provider about what is needed or can continue to wait.        My Access Plan  Medical Emergency 911   Primary Clinic Line [facility] [phone]   24 Hour Appointment Line [phone] or  [phone]) (toll-free)   24 Hour Nurse Line [phone] (toll-free)   Preferred Urgent Care Other   Preferred Hospital [facility]  [phone]   Preferred Pharmacy [id number] - [address] AT [facility] [address]     Behavioral Health Crisis Line The National Suicide Prevention Lifeline at 1-390.591.7327 or 911             My Care Team Members  Patient Care Team       Relationship Specialty Notifications Start End    Marielle Thompson NP PCP - General Nurse Practitioner - Family  1/9/20     Phone: [phone] Fax:  [phone]         [address] [place] [address]    Anne Albarran APRN CNP  Family Practice  9/6/19     Merged    Phone: [phone] Fax: [phone] [address] [address]    Marielle Thompson, NP Assigned PCP   4/12/20     Phone: [phone] Fax: [phone]         [address]  [place] [address]    Anne Bro LSW Lead Care Coordinator Primary Care - CC Admissions 7/10/20     Phone: [phone] Fax: [phone]        Thi Joyce Community Health Worker Primary Care - CC Admissions 7/10/20     Phone: [phone]                 My Care Plans  Self Management and Treatment Plan  Goals and (Comments)  Goals        General    Improve chronic symptoms (pt-stated)     Notes - Note created  7/10/2020  2:16 PM by Anne Bro LSW    Goal Statement: I will attend my neurology appointment and follow up tests.   Date Goal set: 7/10/2020   Barriers: memory, anxiety, motivation  Strengths: I want to understand and improve situation  Date to Achieve By: 1/6/21   Patient expressed understanding of goal: yes  Action steps to achieve this goal:  1. I will attend my neurology appointment  2. I will follow up on recommendations tests                 Action Plans on File:                       Advance Care Plans/Directives Type:        My Medical and Care Information  Problem List   Patient Active Problem List   Diagnosis     Intrauterine fetal death     CARDIOVASCULAR SCREENING; LDL GOAL LESS THAN 160     Fracture of hip (H)     Compression fx, lumbar spine (H)     Papanicolaou smear of cervix with low grade squamous intraepithelial lesion (LGSIL)     Excoriation (skin-picking) disorder     Chronic back pain, unspecified back location, unspecified back pain laterality     Tobacco use disorder      Current Medications and Allergies:  Allergies as of 7/10/2020   Reviewed by Marge Silva MA on 7/9/2020    Severity  Noted  Reaction Type  Reactions    Amoxicillin   Not Specified  02/08/2011        Medications - This is your record. Keep this with you and show to your community pharmacist(s) and physician(s) at each visit.      Instructions for use    acetaminophen (TYLENOL) 650 MG CR tablet  Take 1 tablet by mouth every 8 hours as needed for pain.    albuterol (PROAIR HFA/PROVENTIL HFA/VENTOLIN HFA) 108 (90 Base) MCG/ACT inhaler  INL 2 PFS PO Q 4 H PRF SOB    baclofen (LIORESAL) 20 MG tablet  Take 1 tablet (20 mg) by mouth 3 times daily Due for follow up visit.    buprenorphine-naloxone (SUBOXONE) 8-2 MG SUBL sublingual tablet  Place 1 tablet under the tongue daily 12mg q24hr dose, will take 1.5 tabs    escitalopram (LEXAPRO) 5 MG tablet     gabapentin (NEURONTIN) 300 MG capsule  Take 2 capsules (600 mg) by mouth 3 times daily    hydrOXYzine (VISTARIL) 50 MG capsule  Take 1 capsule (50 mg) by mouth 3 times daily as needed for itching    ibuprofen (ADVIL/MOTRIN) 800 MG tablet  Take 800 mg by mouth every 8 hours as needed for moderate pain    levonorgestrel (MIRENA) 20 MCG/24HR IUD  1 each by Intrauterine route once    loratadine (WAL-ITIN) 10 MG tablet  Take 10 mg by mouth daily          Care Coordination Start Date: 7/10/2020   Frequency of Care Coordination: 6 weeks   Form Last Updated: 07/10/2020

## 2020-07-10 NOTE — LETTER
[facility]  [address]  [place] [address]  [phone]    7/10/2020      Francisca Reed  [address] [address]      Dear  Francisca,    Anh is the honoring eliud form and instructions.  If you have any questions there are numbers on the instructions.     Sincerely,      ELENA Reyna  [facility] - Care Coordination  [facility]   [phone]       # Ileus vs partial SBO    Would f/u with SUrgery again with new clinical updates.   KUB  : grossly distended small bowel, SBO vs Ileus   clinically abdomen got more distended with no flatus.  kept NPO.  Would consider NGT decompression.  IVF LR @ 50 cc/hr.    #SEPSIS- WITH LEUCOCYTOSIS- resolving.osteomyelitis in sacrum with decubitus ulcer.   Possible UTI--- DC antibiotics as per ID    # vit k def - replete orally when can take orally as now he is nauseous  # Hypomagnesemia-- replete iv again today-

## 2020-07-12 LAB — COMPREHEN DRUG ANALYSIS UR: NORMAL

## 2020-07-13 ENCOUNTER — TELEPHONE (OUTPATIENT)
Dept: FAMILY MEDICINE | Facility: OTHER | Age: 35
End: 2020-07-13

## 2020-07-13 NOTE — TELEPHONE ENCOUNTER
Reason for Call:  Other appointment    [facility] is back in long[facility] again as of 07/11/20 and she filled her Suboxone on the 9th for 45 tablets and she is down to 35 pills. she did have meth in her system when arrested- longterm calling to make sure we are contuining the Suboxone plan? Please call Abigail at [phone]    Phone Number Patient can be reached at: Home number on file     Best Time: any    Can we leave a detailed message on this number? YES    Call taken on 7/13/2020 at 12:25 PM by Marge Hargrove

## 2020-07-13 NOTE — TELEPHONE ENCOUNTER
Pt was last seen with Dr. Amado on 2020.  Per OV plan:  ASSESSMENT/PLAN: Francisca Reed is a 35 year old  woman with history as detailed above, being seen for OUD, currently doing poorly, relapsed on meth, significant weight loss/malnurishment, high anxiety, new neurological symptoms. She will see neurology, advised to go to an ED if symptoms get any worse prior to neurology consult. Regarding addiction, has current active rule 25, strongly encouraged to re-enroll in treatment, would recommend inpatient. SW consult ordered, information given for [facility] as a possibly appropriate duel diagnosis treatment center for women. I do not feel that outpatient treatment would be adequate at present.      Will route to Dr. Amado to review and advise on message below.    Lilo Sarah RN

## 2020-07-21 ENCOUNTER — NURSE TRIAGE (OUTPATIENT)
Dept: NURSING | Facility: CLINIC | Age: 35
End: 2020-07-21

## 2020-07-21 NOTE — TELEPHONE ENCOUNTER
"Pt calling    L arm pain & tingling for 6 mos,no improvement \"it is getting worse\" pt has been seen for this by her PCP    R hand is \"not functional at all\", \"it just droops & just lies there\", started 2 weeks ago  Pt denies any pain in hand \" it feels numb\"    Was given prednisone in ED for R hand & it did help both R & L arm/hand for a short while per pt  Per pt the ER provider told pt she should be worked up for MS  + family Hx of MS  Per protocol pt to be evaluated today  No appts available  pt declines UCC or ED  Pt agrees to be seen tomorrow in office appt.  Transferred to scheduling  Pt agrees with plan   Henrietta Mcgraw RN  [facility]  .     Additional Information    Negative: Difficult to awaken or acting confused (e.g., disoriented, slurred speech)    Negative: New neurologic deficit that is present NOW, sudden onset of ANY of the following: * Weakness of the face, arm, or leg on one side of the body * Numbness of the face, arm, or leg on one side of the body * Loss of speech or garbled speech    Negative: Sounds like a life-threatening emergency to the triager    Negative: Confusion, disorientation, or hallucinations is the main symptom    Negative: Dizziness is the main symptom    Negative: Followed a head injury within last 3 days    Negative: Headache (with neurologic deficit)    Negative: Unable to urinate (or only a few drops) and bladder feels very full    Negative: Loss of control of bowel or bladder (i.e., incontinence) of new onset    Negative: Back pain with numbness (loss of sensation) in groin or rectal area    Negative: [place] palsy suspected (i.e., weakness only one side of the face, developing over hours to days, no other symptoms)    Negative: Neurologic deficit of gradual onset, ANY of the following: * Weakness of the face, arm, or leg on one side of the body * Numbness of the face, arm, or leg on one side of the body * Loss of speech or garbled speech    Negative: " Neurologic deficit that was brief (now gone), ANY of the following: * Weakness of the face, arm, or leg on one side of the body * Numbness of the face, arm, or leg on one side of the body * Loss of speech or garbled speech    Negative: Patient sounds very sick or weak to the triager    Patient wants to be seen     Pt agrees to be seen 7/22/2020    Negative: Tingling (e.g., pins and needles) of the face, arm or leg on one side of the body, that is  present now    Negative: Neck pain (with neurologic deficit)    Negative: Back pain (with neurologic deficit)    Protocols used: NEUROLOGIC DEFICIT-A-OH

## 2020-07-22 ENCOUNTER — MYC MEDICAL ADVICE (OUTPATIENT)
Dept: NEUROLOGY | Facility: CLINIC | Age: 35
End: 2020-07-22

## 2020-07-24 ENCOUNTER — OFFICE VISIT (OUTPATIENT)
Dept: FAMILY MEDICINE | Facility: CLINIC | Age: 35
End: 2020-07-24
Payer: COMMERCIAL

## 2020-07-24 VITALS
BODY MASS INDEX: 17.19 KG/M2 | TEMPERATURE: 98.9 F | HEART RATE: 95 BPM | DIASTOLIC BLOOD PRESSURE: 58 MMHG | HEIGHT: 66 IN | WEIGHT: 107 LBS | SYSTOLIC BLOOD PRESSURE: 109 MMHG

## 2020-07-24 DIAGNOSIS — J45.20 MILD INTERMITTENT ASTHMA WITHOUT COMPLICATION: ICD-10-CM

## 2020-07-24 DIAGNOSIS — M54.9 CHRONIC BACK PAIN, UNSPECIFIED BACK LOCATION, UNSPECIFIED BACK PAIN LATERALITY: ICD-10-CM

## 2020-07-24 DIAGNOSIS — S32.000S COMPRESSION FRACTURE OF LUMBAR VERTEBRA, UNSPECIFIED LUMBAR VERTEBRAL LEVEL, SEQUELA: ICD-10-CM

## 2020-07-24 DIAGNOSIS — M21.331 RIGHT WRIST DROP: Primary | ICD-10-CM

## 2020-07-24 DIAGNOSIS — G89.29 CHRONIC BACK PAIN, UNSPECIFIED BACK LOCATION, UNSPECIFIED BACK PAIN LATERALITY: ICD-10-CM

## 2020-07-24 DIAGNOSIS — R11.0 NAUSEA: ICD-10-CM

## 2020-07-24 DIAGNOSIS — F11.90 OPIOID USE DISORDER: ICD-10-CM

## 2020-07-24 LAB
ALBUMIN SERPL-MCNC: 4.1 G/DL (ref 3.4–5)
ALP SERPL-CCNC: 67 U/L (ref 40–150)
ALT SERPL W P-5'-P-CCNC: 40 U/L (ref 0–50)
ANION GAP SERPL CALCULATED.3IONS-SCNC: 3 MMOL/L (ref 3–14)
AST SERPL W P-5'-P-CCNC: 21 U/L (ref 0–45)
BILIRUB SERPL-MCNC: 0.7 MG/DL (ref 0.2–1.3)
BUN SERPL-MCNC: 18 MG/DL (ref 7–30)
CALCIUM SERPL-MCNC: 8.3 MG/DL (ref 8.5–10.1)
CHLORIDE SERPL-SCNC: 106 MMOL/L (ref 94–109)
CK SERPL-CCNC: 305 U/L (ref 30–225)
CO2 SERPL-SCNC: 31 MMOL/L (ref 20–32)
CREAT SERPL-MCNC: 0.65 MG/DL (ref 0.52–1.04)
CRP SERPL-MCNC: <2.9 MG/L (ref 0–8)
ERYTHROCYTE [SEDIMENTATION RATE] IN BLOOD BY WESTERGREN METHOD: 6 MM/H (ref 0–20)
GFR SERPL CREATININE-BSD FRML MDRD: >90 ML/MIN/{1.73_M2}
GLUCOSE SERPL-MCNC: 94 MG/DL (ref 70–99)
POTASSIUM SERPL-SCNC: 3.8 MMOL/L (ref 3.4–5.3)
PROT SERPL-MCNC: 7 G/DL (ref 6.8–8.8)
SODIUM SERPL-SCNC: 140 MMOL/L (ref 133–144)

## 2020-07-24 PROCEDURE — 80053 COMPREHEN METABOLIC PANEL: CPT | Performed by: PHYSICIAN ASSISTANT

## 2020-07-24 PROCEDURE — 86038 ANTINUCLEAR ANTIBODIES: CPT | Performed by: PHYSICIAN ASSISTANT

## 2020-07-24 PROCEDURE — 85652 RBC SED RATE AUTOMATED: CPT | Performed by: PHYSICIAN ASSISTANT

## 2020-07-24 PROCEDURE — 86140 C-REACTIVE PROTEIN: CPT | Performed by: PHYSICIAN ASSISTANT

## 2020-07-24 PROCEDURE — 86618 LYME DISEASE ANTIBODY: CPT | Performed by: PHYSICIAN ASSISTANT

## 2020-07-24 PROCEDURE — 36415 COLL VENOUS BLD VENIPUNCTURE: CPT | Performed by: PHYSICIAN ASSISTANT

## 2020-07-24 PROCEDURE — 99214 OFFICE O/P EST MOD 30 MIN: CPT | Performed by: PHYSICIAN ASSISTANT

## 2020-07-24 PROCEDURE — 82550 ASSAY OF CK (CPK): CPT | Performed by: PHYSICIAN ASSISTANT

## 2020-07-24 RX ORDER — BACLOFEN 20 MG/1
20 TABLET ORAL 3 TIMES DAILY
Qty: 90 TABLET | Refills: 1 | Status: SHIPPED | OUTPATIENT
Start: 2020-07-24 | End: 2020-09-22

## 2020-07-24 RX ORDER — ALBUTEROL SULFATE 90 UG/1
AEROSOL, METERED RESPIRATORY (INHALATION)
Qty: 1 INHALER | Refills: 1 | Status: SHIPPED | OUTPATIENT
Start: 2020-07-24 | End: 2020-08-31

## 2020-07-24 RX ORDER — PREDNISONE 10 MG/1
TABLET ORAL
COMMUNITY
Start: 2020-07-19 | End: 2020-09-18 | Stop reason: ALTCHOICE

## 2020-07-24 RX ORDER — ONDANSETRON 4 MG/1
4-8 TABLET, FILM COATED ORAL EVERY 8 HOURS PRN
Qty: 20 TABLET | Refills: 1 | Status: SHIPPED | OUTPATIENT
Start: 2020-07-24 | End: 2020-10-27

## 2020-07-24 RX ORDER — IBUPROFEN 800 MG/1
800 TABLET, FILM COATED ORAL EVERY 8 HOURS PRN
Qty: 60 TABLET | Refills: 1 | Status: SHIPPED | OUTPATIENT
Start: 2020-07-24 | End: 2020-09-22

## 2020-07-24 RX ORDER — GABAPENTIN 300 MG/1
CAPSULE ORAL
Qty: 210 CAPSULE | Refills: 1 | Status: SHIPPED | OUTPATIENT
Start: 2020-07-24 | End: 2020-09-21

## 2020-07-24 ASSESSMENT — PAIN SCALES - GENERAL: PAINLEVEL: MODERATE PAIN (5)

## 2020-07-24 ASSESSMENT — MIFFLIN-ST. JEOR: SCORE: 1207.1

## 2020-07-24 NOTE — TELEPHONE ENCOUNTER
Pt last seen on 7/9/2020 for oipoid use disorder.    Pt states she is wanting know the best way to taper off Suboxone so she can start with a treatment center. Pt states she doesn't have a set date to start at the center but wants to get in ASAP    RN relayed Dr. Amado is off now and gone next week but will route to him and Dr. Khoury her message.        Patient verbalized understanding and agreed to plan.   Patient was given the opportunity to ask additional questions and have them answered. Patient had no further questions.   Tamar Aldana RN on 7/24/2020 at 3:53 PM
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
## 2020-07-24 NOTE — PATIENT INSTRUCTIONS
To schedule the MRI of the brain, call [phone]    To schedule th EMG of the upper extremity - call [facility] [phone]

## 2020-07-24 NOTE — TELEPHONE ENCOUNTER
Pt calling about her buprenorphine-naloxone (SUBOXONE)  She sated she will be starting an in pt treatment center program and they do not use buprenorphine-naloxone (SUBOXONE)  She would like to know how to taper? Please call to advise. Thank You Pinky

## 2020-07-26 ENCOUNTER — HOSPITAL ENCOUNTER (OUTPATIENT)
Dept: MRI IMAGING | Facility: CLINIC | Age: 35
Discharge: HOME | End: 2020-07-26
Attending: PHYSICIAN ASSISTANT | Admitting: PHYSICIAN ASSISTANT
Payer: COMMERCIAL

## 2020-07-26 DIAGNOSIS — M21.331 RIGHT WRIST DROP: ICD-10-CM

## 2020-07-26 PROCEDURE — 70551 MRI BRAIN STEM W/O DYE: CPT

## 2020-07-27 LAB
ANA SER QL IF: NEGATIVE
B BURGDOR IGG+IGM SER QL: 0.58 (ref 0–0.89)

## 2020-07-27 NOTE — TELEPHONE ENCOUNTER
Silvino Amado MD  You 3 days ago       She can gradually take less suboxone each day. The tabs can be cut into small pieces so that she could go down by a mg at a time, or by even less, depending on severity of withdrawal symptoms.     Message text      Unable to reach patient via phone. RN left a message and instructed patient to call the clinic at [phone] and ask for Women's Health.    Tamar Aldana RN on 7/27/2020 at 8:15 AM

## 2020-07-28 NOTE — TELEPHONE ENCOUNTER
Nidhi Khoury, DO  You 12 minutes ago (10:09 AM)       According to the [facility], this can take over 8 days to no longer be detectable in the body.     Nidhi Kohury, DO    Message text      RN called and relayed message to pt.     Patient verbalized understanding and agreed to plan.   Patient was given the opportunity to ask additional questions and have them answered. Patient had no further questions.   Tamar Aldana RN on 7/28/2020 at 10:27 AM

## 2020-07-28 NOTE — TELEPHONE ENCOUNTER
RN called and relayed providers advisement. Pt wondering how long suboxone stays in her system after tapering off, RN did not find definitive answer but is estimated over 8 days.    RN routing to provider for advisement.    Tamar Aldana RN on 7/28/2020 at 9:11 AM

## 2020-08-06 ENCOUNTER — VIRTUAL VISIT (OUTPATIENT)
Dept: NEUROLOGY | Facility: CLINIC | Age: 35
End: 2020-08-06
Payer: COMMERCIAL

## 2020-08-06 DIAGNOSIS — M21.331 RIGHT WRIST DROP: Primary | ICD-10-CM

## 2020-08-06 PROCEDURE — 99201 ZZC OFFICE/OUTPT VISIT, NEW, LEVEL I: CPT | Mod: 95 | Performed by: PSYCHIATRY & NEUROLOGY

## 2020-08-06 NOTE — PROGRESS NOTES
Physician Note:    I have been asked by Dr. Amado to see Francisca for sensory changes and weakness in distal LEs and Right hand.    Per Dr. Amado's 7.9.20 note:  She reports intermittent loss of strength and sensation of right upper and lower distal extremities, loss of sensation in left upper extremity. Right hand and foot will involuntary pronate, she is unable to grasp. Will gradually recover after a few days. Present for the past 6 months though getting more frequent. She is worried about MS.     Strength exam recorded form that visit indicates some weakness in the Right hand.     The patient also saw Cindy Tierney on 7.24.20 for the difficulties with her Right hand. Described as wrist drop on exam. Inflammatory labs were negative, as was Lyme test. CK was elevated: 305. Brain MRI was unremarkable. Prior cervical spine imaging also reported as unremarkable (outside ED, imaging not available). It appears that she was also referred for electromyogram but I do not see any results in the chart    The patient has additional history of polysubstance use, PTSD, lumbar fracture, asthma and chronic back pain.     I spoke with Francisca via video conference today. She tells me that about 8 months ago, she started having pain and tingling from the Left shoulder down into the arm and hand. No history of neck injury preceding this. She then woke with weakness at the Right wrist about one month ago. No prior injury. There was some minor pain/discomfort though. She says the gabapentin does help some with the pain. She takes 600/600/900mg and denies side effects. She asks if there is more she can take for the pain.    She reports that she has been wearing a wrist splint and has noticed that she can move her fingers now, but the hand still flops down at the wrist.    She has some tingling in the feet and cramping of the Right foot at times. She has some double vision at times. No problems with swallow.      She says she has  been trying to set up the electromyogram at our clinic in [place] but has had trouble reaching them.     She says she is not currently using any illegal substances. She does use tobacco.     Her paternal grandfather had MS. Otherwise no known Neurologic disorders in her family.     Past Medical History:   Diagnosis Date     Anxiety      C section 04/2008     Papanicolaou smear of cervix with low grade squamous intraepithelial lesion (LGSIL) 06/13/2008 6/13/2008 Pap: LSIL (see Care Everywhere)     PTSD (post-traumatic stress disorder)      Family History   Problem Relation Age of Onset     Diabetes Father      Bipolar Disorder Mother      Bipolar Disorder Brother      Depression Brother      Current Outpatient Medications   Medication     acetaminophen (TYLENOL) 650 MG CR tablet     albuterol (PROAIR HFA/PROVENTIL HFA/VENTOLIN HFA) 108 (90 Base) MCG/ACT inhaler     baclofen (LIORESAL) 20 MG tablet     buprenorphine-naloxone (SUBOXONE) 8-2 MG SUBL sublingual tablet     Calcium-Magnesium 100-50 MG TABS     escitalopram (LEXAPRO) 5 MG tablet     gabapentin (NEURONTIN) 300 MG capsule     hydrOXYzine (VISTARIL) 50 MG capsule     ibuprofen (ADVIL/MOTRIN) 800 MG tablet     levonorgestrel (MIRENA) 20 MCG/24HR IUD     loratadine (WAL-ITIN) 10 MG tablet     ondansetron (ZOFRAN) 4 MG tablet     VITAMIN D PO     predniSONE (DELTASONE) 10 MG tablet     No current facility-administered medications for this visit.      EXAM (via video - limited): Alert and attentive. Speech is normal Facial movements and EOM full and symmetric. Tongue midline. Right wrist drop, some finger movements. Left UE movement normal.     Relevant Data:  MRI Brain (7.26.20):  IMPRESSION:   1.  No acute intracranial finding. No evidence for recent ischemia,  intracranial hemorrhage, or mass.  2.  Low lying, slightly pointed cerebellar tonsils without definite  Chiari malformation.    Imaging reviewed by me. Agree with Radiology read.     Assessment and  Plan:  Encounter Diagnosis   Name Primary?     Right wrist drop Yes       Ms. Reed is a pleasant 36 yo woman referred to Neurology for Right wrist drop. We discussed the MRI results, which are reassuring in terms of this not being a central nervous system disorder. I explained various potential causes for peripheral nerve syndromes to the patient: compression being the most common. There could be something going on more diffusely as well, given the elevated CK. She has already been referred for nerve conduction studies/EMG which I think is the appropriate next step. We will try to help her facilitate setting this up. I would like to see Francisca in clinic for follow-up after the testing is completed. She understands and agrees with the plan.    Patient Instructions:  -- Set up the nerve conduction studies/EMG. I have asked my clinic staff to reach out to the [place] office to help facilitate this.  -- Continue to use the wrist splint for now.  -- Increase the gabapentin to 900mg 3 times daily for the Left arm pain.  -- Follow-up in Neurology clinic in 6-8 weeks.     Video duration: 16 minutes.     Vickie Al MD  Neurology    CC: Silvino Amado MD

## 2020-08-06 NOTE — PATIENT INSTRUCTIONS
Patient Instructions:  -- Set up the nerve conduction studies/EMG. I have asked my clinic staff to reach out to the [place] office to help facilitate this.  -- Continue to use the wrist splint for now.  -- Increase the gabapentin to 900mg 3 times daily for the Left arm pain.  -- Follow-up in Neurology clinic in 6-8 weeks.     I'm sorry you've had trouble scheduling the EMG.  I checked with [facility].  They confirmed the order is in and said that if you call them at [phone], they can help you get scheduled.  I did ask if they could reach out to you, but unfortunately, they're exclusively an incoming call center.  But they assured me that if you call that number, anyone who answers can schedule you.  Please let me know if you continue to have problems.  My phone is [phone].  Demetria FLORES-CMA

## 2020-08-06 NOTE — PROGRESS NOTES
"Francisca Reed is a 35 year old female who is being evaluated via a billable video visit.      The patient has been notified of following:     \"This video visit will be conducted via a call between you and your physician/provider. We have found that certain health care needs can be provided without the need for an in-person physical exam.  This service lets us provide the care you need with a video conversation.  If a prescription is necessary we can send it directly to your pharmacy.  If lab work is needed we can place an order for that and you can then stop by our lab to have the test done at a later time.    Video visits are billed at different rates depending on your insurance coverage.  Please reach out to your insurance provider with any questions.    If during the course of the call the physician/provider feels a video visit is not appropriate, you will not be charged for this service.\"    Patient has given verbal consent for Video visit? Yes  How would you like to obtain your AVS? MyChart  If you are dropped from the video visit, the video invite should be resent to: Other e-mail: [email or web address]  Will anyone else be joining your video visit? No        Video-Visit Details    Type of service:  Video Visit    Video Start Time: 9:19 AM  Video End Time: 9:35 AM    Originating Location (pt. Location): Home    Distant Location (provider location):  [facility]     Platform used for Video Visit: Tato Al MD        "

## 2020-08-06 NOTE — LETTER
"    8/6/2020         RE: Francisca Reed  [address] [address] [facility] [address]        Dear Colleague,    Thank you for referring your patient, Francisca Reed, to the [facility]. Please see a copy of my visit note below.    Francisca Reed is a 35 year old female who is being evaluated via a billable video visit.      The patient has been notified of following:     \"This video visit will be conducted via a call between you and your physician/provider. We have found that certain health care needs can be provided without the need for an in-person physical exam.  This service lets us provide the care you need with a video conversation.  If a prescription is necessary we can send it directly to your pharmacy.  If lab work is needed we can place an order for that and you can then stop by our lab to have the test done at a later time.    Video visits are billed at different rates depending on your insurance coverage.  Please reach out to your insurance provider with any questions.    If during the course of the call the physician/provider feels a video visit is not appropriate, you will not be charged for this service.\"    Patient has given verbal consent for Video visit? Yes  How would you like to obtain your AVS? MyChart  If you are dropped from the video visit, the video invite should be resent to: Other e-mail: [email or web address]  Will anyone else be joining your video visit? No        Video-Visit Details    Type of service:  Video Visit    Video Start Time: 9:19 AM  Video End Time: 9:35 AM    Originating Location (pt. Location): Home    Distant Location (provider location):  [facility]     Platform used for Video Visit: Ttao Al MD          Physician Note:    I have been asked by Dr. Amado to see Francisca for sensory changes and weakness in distal LEs and Right hand.    Per Dr. Amado's 7.9.20 note:  She reports intermittent loss of strength and " sensation of right upper and lower distal extremities, loss of sensation in left upper extremity. Right hand and foot will involuntary pronate, she is unable to grasp. Will gradually recover after a few days. Present for the past 6 months though getting more frequent. She is worried about MS.     Strength exam recorded form that visit indicates some weakness in the Right hand.     The patient also saw Cindy Tierney on 7.24.20 for the difficulties with her Right hand. Described as wrist drop on exam. Inflammatory labs were negative, as was Lyme test. CK was elevated: 305. Brain MRI was unremarkable. Prior cervical spine imaging also reported as unremarkable (outside ED, imaging not available). It appears that she was also referred for electromyogram but I do not see any results in the chart    The patient has additional history of polysubstance use, PTSD, lumbar fracture, asthma and chronic back pain.     I spoke with Francisca via video conference today. She tells me that about 8 months ago, she started having pain and tingling from the Left shoulder down into the arm and hand. No history of neck injury preceding this. She then woke with weakness at the Right wrist about one month ago. No prior injury. There was some minor pain/discomfort though. She says the gabapentin does help some with the pain. She takes 600/600/900mg and denies side effects. She asks if there is more she can take for the pain.    She reports that she has been wearing a wrist splint and has noticed that she can move her fingers now, but the hand still flops down at the wrist.    She has some tingling in the feet and cramping of the Right foot at times. She has some double vision at times. No problems with swallow.      She says she has been trying to set up the electromyogram at our clinic in [place] but has had trouble reaching them.     She says she is not currently using any illegal substances. She does use tobacco.     Her paternal  grandfather had MS. Otherwise no known Neurologic disorders in her family.     Past Medical History:   Diagnosis Date     Anxiety      C section 04/2008     Papanicolaou smear of cervix with low grade squamous intraepithelial lesion (LGSIL) 06/13/2008 6/13/2008 Pap: LSIL (see Care Everywhere)     PTSD (post-traumatic stress disorder)      Family History   Problem Relation Age of Onset     Diabetes Father      Bipolar Disorder Mother      Bipolar Disorder Brother      Depression Brother      Current Outpatient Medications   Medication     acetaminophen (TYLENOL) 650 MG CR tablet     albuterol (PROAIR HFA/PROVENTIL HFA/VENTOLIN HFA) 108 (90 Base) MCG/ACT inhaler     baclofen (LIORESAL) 20 MG tablet     buprenorphine-naloxone (SUBOXONE) 8-2 MG SUBL sublingual tablet     Calcium-Magnesium 100-50 MG TABS     escitalopram (LEXAPRO) 5 MG tablet     gabapentin (NEURONTIN) 300 MG capsule     hydrOXYzine (VISTARIL) 50 MG capsule     ibuprofen (ADVIL/MOTRIN) 800 MG tablet     levonorgestrel (MIRENA) 20 MCG/24HR IUD     loratadine (WAL-ITIN) 10 MG tablet     ondansetron (ZOFRAN) 4 MG tablet     VITAMIN D PO     predniSONE (DELTASONE) 10 MG tablet     No current facility-administered medications for this visit.      EXAM (via video - limited): Alert and attentive. Speech is normal Facial movements and EOM full and symmetric. Tongue midline. Right wrist drop, some finger movements. Left UE movement normal.     Relevant Data:  MRI Brain (7.26.20):  IMPRESSION:   1.  No acute intracranial finding. No evidence for recent ischemia,  intracranial hemorrhage, or mass.  2.  Low lying, slightly pointed cerebellar tonsils without definite  Chiari malformation.    Imaging reviewed by me. Agree with Radiology read.     Assessment and Plan:  Encounter Diagnosis   Name Primary?     Right wrist drop Yes       Ms. Reed is a pleasant 34 yo woman referred to Neurology for Right wrist drop. We discussed the MRI results, which are reassuring in  terms of this not being a central nervous system disorder. I explained various potential causes for peripheral nerve syndromes to the patient: compression being the most common. There could be something going on more diffusely as well, given the elevated CK. She has already been referred for nerve conduction studies/EMG which I think is the appropriate next step. We will try to help her facilitate setting this up. I would like to see Francisca in clinic for follow-up after the testing is completed. She understands and agrees with the plan.    Patient Instructions:  -- Set up the nerve conduction studies/EMG. I have asked my clinic staff to reach out to the [place] office to help facilitate this.  -- Continue to use the wrist splint for now.  -- Increase the gabapentin to 900mg 3 times daily for the Left arm pain.  -- Follow-up in Neurology clinic in 6-8 weeks.     Video duration: 16 minutes.     Vickie Al MD  Neurology    CC: Silvino Amado MD      Again, thank you for allowing me to participate in the care of your patient.        Sincerely,        Vickie Al MD

## 2020-08-06 NOTE — NURSING NOTE
"Initial There were no vitals taken for this visit. Estimated body mass index is 17.27 kg/m  as calculated from the following:    Height as of 7/24/20: 1.676 m (5' 6\").    Weight as of 7/24/20: 48.5 kg (107 lb).     Demetria FLORESA      "

## 2020-08-20 ENCOUNTER — PATIENT OUTREACH (OUTPATIENT)
Dept: CARE COORDINATION | Facility: CLINIC | Age: 35
End: 2020-08-20

## 2020-08-20 DIAGNOSIS — M21.331 RIGHT WRIST DROP: Primary | ICD-10-CM

## 2020-08-20 NOTE — PROGRESS NOTES
Clinic Care Coordination Contact    Follow Up Progress Note      Assessment: Patient reports that she has gotten everything done except for the nerve conduction study/EMG.  She said her and her mom have been trying to reach the [place] office and have not been able to reach anybody with multiple attempts almost daily.    Goals addressed this encounter:   Goals Addressed                 This Visit's Progress      Improve chronic symptoms (pt-stated)   80%     Goal Statement: I will attend my neurology appointment and follow up tests.   Date Goal set: 7/10/2020   Barriers: memory, anxiety, motivation  Strengths: I want to understand and improve situation  Date to Achieve By: 1/6/21   Patient expressed understanding of goal: yes  Action steps to achieve this goal:  1. I will attend my neurology appointment  2. I will follow up on recommendations tests                 Intervention/Education provided during outreach: Personal review neurologist said she was going to have her team try and facilitate this and unsure where that is.  Will route message back to neurologist to try and find out where that is in the process.  Will review chart in about 1 week to see if they had any results and continue to follow-up if not heard back.     Outreach Frequency: 6 weeks    Plan:   Will route to neurology to find out where the process they are with getting a nerve conduction study/EMG schedule.  Care Coordinator will follow up in 1 week.    ELENA Reyna, [facility] - Care Coordinator   [facility]  8/20/2020   8:43 AM  [phone]

## 2020-08-20 NOTE — TELEPHONE ENCOUNTER
Patient advised of referral via MyChart.  Given expectation regarding timeframe to hear from schedulers (24 hours), and given phone number to call if they don't hear from the  staff.

## 2020-08-20 NOTE — TELEPHONE ENCOUNTER
Let's try putting this in under Ortho .  Then they call and  assist patient in finding the most appropriate clinic for the EMG.    Cued up.

## 2020-08-26 NOTE — PROGRESS NOTES
Clinic Care Coordination Contact    Situation: Patient chart reviewed by care coordinator.    Background: On last out reach patient had not been able to schedule EMG nor reach anyone to schedule.    Assessment: Upon review a new order was sent in and there was an out reach on 8/21/2020 to assist with scheduling an EMG.    Plan/Recommendations: We will follow-up with patient  in about 5 weeks.    ELENA Reyna, [facility] - Care Coordinator   [facility]  8/26/2020   2:58 PM  [phone]

## 2020-08-28 ENCOUNTER — PATIENT OUTREACH (OUTPATIENT)
Dept: NURSING | Facility: CLINIC | Age: 35
End: 2020-08-28
Payer: COMMERCIAL

## 2020-08-28 NOTE — PROGRESS NOTES
Clinic Care Coordination Contact  Community Health Worker Follow Up    Goals:   Goals Addressed as of 8/28/2020 at 3:12 PM                 Today    8/20/20       Patient Stated      Improve chronic symptoms (pt-stated)   70%  80%    Added 7/10/20 by Anne Bro LSW     Goal Statement: I will attend my neurology appointment and follow up tests.   Date Goal set: 7/10/2020   Barriers: memory, anxiety, motivation  Strengths: I want to understand and improve situation  Date to Achieve By: 1/6/21   Patient expressed understanding of goal: yes  Action steps to achieve this goal:  1. I will attend my neurology appointment  2. I will follow up on recommendations tests            Intervention and Education during outreach: Monthly    CHW Plan: Will reach out next week to make sure patient was successful at making an appt with DIM[facility] Copeland, Dr. Al.   Patient stated she will attempt to call DIM[facility] Copeland and make an appt for an EMG (ordered by Dr. Al)   [phone] for scheduling the EMG, phone number  given to patient.   She has not been successful at trying to schedule at [place] and thought that was the only location available to do testing.     Patient reports that she was diagnosed with Lyme's disease which has affected every aspect of her health and personal life. She has been feeling extremely exhausted, unhealthy, depressed and hopeless about feeling better. CHW expressed sympathy and confirmed that she will get a follow up call next week if CC does not see an appt scheduled. Patient stated that she was thankful to have CC checking in and following up on this test and appreciates everything we have done and continue to do.     Thi COLBERT Community Health Worker  [facility] Care Coordination  [facility] [place]  Phone: [phone]

## 2020-08-29 DIAGNOSIS — J45.20 MILD INTERMITTENT ASTHMA WITHOUT COMPLICATION: ICD-10-CM

## 2020-08-31 RX ORDER — ALBUTEROL SULFATE 90 UG/1
AEROSOL, METERED RESPIRATORY (INHALATION)
Qty: 6.7 G | Refills: 0 | Status: SHIPPED | OUTPATIENT
Start: 2020-08-31 | End: 2020-11-10

## 2020-09-08 ENCOUNTER — OFFICE VISIT (OUTPATIENT)
Dept: OBGYN | Facility: CLINIC | Age: 35
End: 2020-09-08
Payer: COMMERCIAL

## 2020-09-08 VITALS
SYSTOLIC BLOOD PRESSURE: 110 MMHG | TEMPERATURE: 98.3 F | DIASTOLIC BLOOD PRESSURE: 60 MMHG | WEIGHT: 119.7 LBS | HEART RATE: 99 BPM | BODY MASS INDEX: 19.32 KG/M2

## 2020-09-08 DIAGNOSIS — F11.90 OPIOID USE DISORDER: Primary | ICD-10-CM

## 2020-09-08 PROCEDURE — 80307 DRUG TEST PRSMV CHEM ANLYZR: CPT | Mod: 90 | Performed by: OBSTETRICS & GYNECOLOGY

## 2020-09-08 PROCEDURE — 99214 OFFICE O/P EST MOD 30 MIN: CPT | Performed by: OBSTETRICS & GYNECOLOGY

## 2020-09-08 PROCEDURE — 99000 SPECIMEN HANDLING OFFICE-LAB: CPT | Performed by: OBSTETRICS & GYNECOLOGY

## 2020-09-08 RX ORDER — MINOCYCLINE HYDROCHLORIDE 100 MG/1
CAPSULE ORAL
COMMUNITY
Start: 2020-08-18 | End: 2022-06-14

## 2020-09-08 RX ORDER — IVERMECTIN 3 MG/1
TABLET ORAL
COMMUNITY
Start: 2020-08-18 | End: 2020-09-28

## 2020-09-08 RX ORDER — BUPRENORPHINE HYDROCHLORIDE AND NALOXONE HYDROCHLORIDE DIHYDRATE 8; 2 MG/1; MG/1
1 TABLET SUBLINGUAL DAILY
Qty: 45 TABLET | Refills: 0 | Status: SHIPPED | OUTPATIENT
Start: 2020-09-08 | End: 2020-11-17

## 2020-09-08 RX ORDER — CEFUROXIME AXETIL 500 MG/1
TABLET ORAL
COMMUNITY
Start: 2020-08-18 | End: 2024-02-02

## 2020-09-08 RX ORDER — RIFAMPIN 300 MG/1
CAPSULE ORAL
COMMUNITY
Start: 2020-08-18 | End: 2022-06-14

## 2020-09-08 NOTE — NURSING NOTE
"Chief Complaint   Patient presents with     Follow Up     Suboxone       Initial /60 (BP Location: Right arm, Patient Position: Chair, Cuff Size: Adult Regular)   Pulse 99   Temp 98.3  F (36.8  C) (Temporal)   Wt 54.3 kg (119 lb 11.2 oz)   LMP  (LMP Unknown)   BMI 19.32 kg/m   Estimated body mass index is 19.32 kg/m  as calculated from the following:    Height as of 20: 1.676 m (5' 6\").    Weight as of this encounter: 54.3 kg (119 lb 11.2 oz).  BP completed using cuff size: regular        The following HM Due: NONE      The following patient reported/Care Every where data was sent to:  P ABSTRACT QUALITY INITIATIVES [[id number]]       Marge Silva CMA  2020             "

## 2020-09-08 NOTE — PROGRESS NOTES
Clinic Note    CC:    Chief Complaint   Patient presents with     Follow Up     Suboxone        HPI:  Francisca Reed is a 35 year old  being see for f/u for OUD  Back together with , they have not . She states that he is being supportive and this has been a huge improvement in her life, as is being back with her son.   Has been gaining weight finally, BMI up to 19, and she feels healthier.     Plans to start inpatient treatment, [facility].     Last used meth a couple of days ago, using up to a few times per weeks. Not using any other opioids though craves them and would seek them if not on suboxone. Has been trying to taper down on suboxone, taking half to one tap though worries about relapse. She is torn between two treatment options, one she prefers because it would keep her more united with her children, though would not allow her to continue on the suboxone while inpatient.   Recent intermediate for a few days, now out but has court pending.     Her last drug screen showed meth ().     She states she also have been receiving evaluations for the weakness in her right upper extremity. Neurology visit on  with EMG ordered though not yet collected.   She states Dx with chronic lymes, receiving cares for this.     Strong family hx mood disorders, with mother and brother with severe bipolar disorder, and another brother with MDD. Now with recent suicide of one brother.       History of substance abuse: Using opioids (pills) for 10 years, first began after a back surgery, has used on an off, mostly gotten illicitly. She does have a depression hx, also relatively severe postpartum depression. She describes a cyclic pattern with intermittent weeks of insomnia, other weeks of extreme isolation and depression.  She has now had a mental health evaluation, diagnosed with PTSD and anxiety. She has been using methamphetamines on and off since January. Pending separation/divorce and  disputed custody of children, though now apparently back together.    She is using an IUD for contraception.      PMH:   Past Medical History:   Diagnosis Date     Anxiety      C section 2008     Papanicolaou smear of cervix with low grade squamous intraepithelial lesion (LGSIL) 2008 Pap: LSIL (see Care Everywhere)     PTSD (post-traumatic stress disorder)      SurgHx:   Past Surgical History:   Procedure Laterality Date     BACK SURGERY      fusion left side lower back     BREAST SURGERY  2010     broken bone      arm     C BREAST AUGMENTATION        SECTION        SECTION  2017    x2     GYN SURGERY       INJECT JOINT SACROILIAC       FamHx:   Family History   Problem Relation Age of Onset     Diabetes Father      Bipolar Disorder Mother      Bipolar Disorder Brother      Depression Brother      SocHx:   Social History     Socioeconomic History     Marital status:      Spouse name: None     Number of children: 5     Years of education: some college     Highest education level: Some college, no degree   Occupational History     None   Social Needs     Financial resource strain: Very hard     Food insecurity     Worry: Never true     Inability: Never true     Transportation needs     Medical: No     Non-medical: No   Tobacco Use     Smoking status: Current Every Day Smoker     Packs/day: 0.50     Types: Cigarettes     Smokeless tobacco: Never Used   Substance and Sexual Activity     Alcohol use: Not Currently     Comment: rare     Drug use: Not Currently     Types: Oxycodone, Amphetamines     Comment: recovering, last use 2.5 weeks befire 7/10/2020     Sexual activity: Yes     Partners: Male     Birth control/protection: I.U.D.     Comment: mirena - placed 2 years ago as of    Lifestyle     Physical activity     Days per week: 1 day     Minutes per session: 30 min     Stress: Very much   Relationships     Social connections     Talks on phone:  More than three times a week     Gets together: More than three times a week     Attends Buddhism service: More than 4 times per year     Active member of club or organization: No     Attends meetings of clubs or organizations: Never     Relationship status:      Intimate partner violence     Fear of current or ex partner: No     Emotionally abused: No     Physically abused: No     Forced sexual activity: No   Other Topics Concern     Parent/sibling w/ CABG, MI or angioplasty before 65F 55M? Not Asked   Social History Narrative    ** Merged History Encounter **          Allergies:   Amoxicillin  Current Medications:   Current Outpatient Medications   Medication Sig Dispense Refill     acetaminophen (TYLENOL) 650 MG CR tablet Take 1 tablet by mouth every 8 hours as needed for pain. 100 tablet 11     albuterol (PROAIR HFA/PROVENTIL HFA/VENTOLIN HFA) 108 (90 Base) MCG/ACT inhaler INHALE 2 PUFFS BY MOUTH EVERY 4 HOURS AS NEEDED FOR SHORTNESS OF BREATH 6.7 g 0     baclofen (LIORESAL) 20 MG tablet Take 1 tablet (20 mg) by mouth 3 times daily Due for follow up visit. 90 tablet 1     buprenorphine-naloxone (SUBOXONE) 8-2 MG SUBL sublingual tablet Place 1 tablet under the tongue daily 12mg q24hr dose, will take 1.5 tabs 45 tablet 0     cefuroxime (CEFTIN) 500 MG tablet TK 1 T PO BID WITH FOOD       Cyanocobalamin (VITAMIN B-12 PO)        escitalopram (LEXAPRO) 5 MG tablet 10 mg        gabapentin (NEURONTIN) 300 MG capsule Take 2 capsules (600mg) in the morning or afternoon and 3 capsules (900mg) at bedtime 210 capsule 1     ibuprofen (ADVIL/MOTRIN) 800 MG tablet Take 1 tablet (800 mg) by mouth every 8 hours as needed for moderate pain 60 tablet 1     ivermectin (STROMECTOL) 3 MG TABS tablet TK 1 T PO D FOR 10 DAYS       levonorgestrel (MIRENA) 20 MCG/24HR IUD 1 each by Intrauterine route once       loratadine (WAL-ITIN) 10 MG tablet Take 10 mg by mouth daily       MAGNESIUM PO Take 200 mg by mouth       minocycline  (MINOCIN) 100 MG capsule TK 1 C PO BID WITH FOOD. AVOID CALCIUM/MAGNESIUM/DAIRY WITHIN 2 HOURS OF TAKING. AVOID SUN       ondansetron (ZOFRAN) 4 MG tablet Take 1-2 tablets (4-8 mg) by mouth every 8 hours as needed for nausea 20 tablet 1     Probiotic Product (PROBIOTIC DAILY PO)        rifampin (RIFADIN) 300 MG capsule TK 1 C PO BID WITH FOOD       VITAMIN D PO Take 1 tablet by mouth daily        Calcium-Magnesium 100-50 MG TABS Take 1 tablet by mouth daily Unsure strengths       hydrOXYzine (VISTARIL) 50 MG capsule Take 1 capsule (50 mg) by mouth 3 times daily as needed for itching (Patient not taking: Reported on 2020) 90 capsule 1     predniSONE (DELTASONE) 10 MG tablet TAKE 4 TABLETS BY MOUTH ONCE DAILY FOR 2 DAYS THEN 3 TABS ONCE DAILY FOR 2 DAYS THEN 2 TABS ONCE DAILY FOR 2 DAYS THEN 1 TAB ONCE DAILY FOR       ROS: 10 point ROS negative other than as noted in the HPI    EXAM:  Vitals:    20 1710   BP: 110/60   BP Location: Right arm   Patient Position: Chair   Cuff Size: Adult Regular   Pulse: 99   Temp: 98.3  F (36.8  C)   TempSrc: Temporal   Weight: 54.3 kg (119 lb 11.2 oz)    Body mass index is 19.32 kg/m .    Gen: Alert, oriented, appropriately interactive, NAD  CV: RRR, no murmurs, no extra heart sounds, 2+ peripheral pulses  Resp: CTAB, good effort without distress   Abdomen: soft, non tender, non distended, no masses    Labs & Imaging:  GC/Chlam-, HIV-, Hep C-, Hep B-, Trep-, Hgb & TSH wnl (20)    Lab Results   Component Value Date    PAP NIL 2019     ASSESSMENT/PLAN: Francisca Reed is a 35 year old  being see for f/u for OUD. History as above. Some improvements since last visit (gaining weight), though still high risk behavior, ongoing meth use, recent intermediate. I am again strongly recommending inpatient treatment with focus on dual diagnosis. I will continue suboxone for harm reduction though do not believe that any outpatient therapy will be enough at this point. Her primary  motivator has been her children and she understands that how her addiction vs recovery goes moving forward will likely continue to significantly influence her access to her children.     Opioid use disorder (H)  - Drug  Screen Comprehensive, Urine w/o Reported Meds (Pain Care Package)  - buprenorphine-naloxone (SUBOXONE) 8-2 MG SUBL sublingual tablet; Place 1 tablet under the tongue daily 12mg q24hr dose, will take 1.5 tabs  Dispense: 45 tablet; Refill: 0    Silvino Amado MD   9/8/2020 5:18 PM

## 2020-09-11 LAB — COMPREHEN DRUG ANALYSIS UR: NORMAL

## 2020-09-17 ENCOUNTER — TELEPHONE (OUTPATIENT)
Dept: FAMILY MEDICINE | Facility: CLINIC | Age: 35
End: 2020-09-17

## 2020-09-17 DIAGNOSIS — M54.9 CHRONIC BACK PAIN, UNSPECIFIED BACK LOCATION, UNSPECIFIED BACK PAIN LATERALITY: ICD-10-CM

## 2020-09-17 DIAGNOSIS — G89.29 CHRONIC BACK PAIN, UNSPECIFIED BACK LOCATION, UNSPECIFIED BACK PAIN LATERALITY: ICD-10-CM

## 2020-09-18 ENCOUNTER — TELEPHONE (OUTPATIENT)
Dept: FAMILY MEDICINE | Facility: CLINIC | Age: 35
End: 2020-09-18

## 2020-09-18 ENCOUNTER — OFFICE VISIT (OUTPATIENT)
Dept: FAMILY MEDICINE | Facility: CLINIC | Age: 35
End: 2020-09-18
Payer: COMMERCIAL

## 2020-09-18 VITALS
RESPIRATION RATE: 20 BRPM | HEART RATE: 104 BPM | TEMPERATURE: 97.9 F | SYSTOLIC BLOOD PRESSURE: 130 MMHG | WEIGHT: 115.6 LBS | DIASTOLIC BLOOD PRESSURE: 81 MMHG | OXYGEN SATURATION: 94 % | BODY MASS INDEX: 18.66 KG/M2

## 2020-09-18 DIAGNOSIS — Z79.899 ENCOUNTER FOR LONG-TERM (CURRENT) USE OF OTHER MEDICATIONS: Primary | ICD-10-CM

## 2020-09-18 DIAGNOSIS — B96.89 BACTERIAL CHOLANGITIS (H): ICD-10-CM

## 2020-09-18 DIAGNOSIS — K83.09 BACTERIAL CHOLANGITIS (H): ICD-10-CM

## 2020-09-18 DIAGNOSIS — F41.8 SITUATIONAL ANXIETY: ICD-10-CM

## 2020-09-18 DIAGNOSIS — A69.22 OTHER NEUROLOGIC DISORDERS IN LYME DISEASE: ICD-10-CM

## 2020-09-18 DIAGNOSIS — A69.20 LYME DISEASE: ICD-10-CM

## 2020-09-18 DIAGNOSIS — M75.42 IMPINGEMENT SYNDROME OF SHOULDER REGION, LEFT: ICD-10-CM

## 2020-09-18 DIAGNOSIS — J45.20 MILD INTERMITTENT ASTHMA WITHOUT COMPLICATION: ICD-10-CM

## 2020-09-18 DIAGNOSIS — M54.9 CHRONIC BACK PAIN, UNSPECIFIED BACK LOCATION, UNSPECIFIED BACK PAIN LATERALITY: ICD-10-CM

## 2020-09-18 DIAGNOSIS — M25.512 CHRONIC LEFT SHOULDER PAIN: ICD-10-CM

## 2020-09-18 DIAGNOSIS — I51.9 MYXEDEMA HEART DISEASE: ICD-10-CM

## 2020-09-18 DIAGNOSIS — R25.1 TREMORS OF NERVOUS SYSTEM: ICD-10-CM

## 2020-09-18 DIAGNOSIS — G89.29 CHRONIC BACK PAIN, UNSPECIFIED BACK LOCATION, UNSPECIFIED BACK PAIN LATERALITY: ICD-10-CM

## 2020-09-18 DIAGNOSIS — S32.000S COMPRESSION FRACTURE OF LUMBAR VERTEBRA, UNSPECIFIED LUMBAR VERTEBRAL LEVEL, SEQUELA: ICD-10-CM

## 2020-09-18 DIAGNOSIS — E03.9 MYXEDEMA HEART DISEASE: ICD-10-CM

## 2020-09-18 DIAGNOSIS — F43.23 ADJUSTMENT DISORDER WITH MIXED ANXIETY AND DEPRESSED MOOD: ICD-10-CM

## 2020-09-18 DIAGNOSIS — F11.90 OPIOID USE DISORDER: ICD-10-CM

## 2020-09-18 DIAGNOSIS — G89.29 CHRONIC LEFT SHOULDER PAIN: ICD-10-CM

## 2020-09-18 DIAGNOSIS — G54.0 AXILLARY NERVE PALSY: Primary | ICD-10-CM

## 2020-09-18 DIAGNOSIS — R41.3 MEMORY LOSS: ICD-10-CM

## 2020-09-18 DIAGNOSIS — F41.0 PANIC ATTACK: ICD-10-CM

## 2020-09-18 LAB
ALBUMIN SERPL-MCNC: 3.7 G/DL (ref 3.4–5)
ALP SERPL-CCNC: 74 U/L (ref 40–150)
ALT SERPL W P-5'-P-CCNC: 21 U/L (ref 0–50)
AST SERPL W P-5'-P-CCNC: 14 U/L (ref 0–45)
BASOPHILS # BLD AUTO: 0 10E9/L (ref 0–0.2)
BASOPHILS NFR BLD AUTO: 0.4 %
BILIRUB DIRECT SERPL-MCNC: 0.2 MG/DL (ref 0–0.2)
BILIRUB SERPL-MCNC: 0.6 MG/DL (ref 0.2–1.3)
BUN SERPL-MCNC: 19 MG/DL (ref 7–30)
CREAT SERPL-MCNC: 0.76 MG/DL (ref 0.52–1.04)
DIFFERENTIAL METHOD BLD: NORMAL
EOSINOPHIL # BLD AUTO: 0.4 10E9/L (ref 0–0.7)
EOSINOPHIL NFR BLD AUTO: 7.3 %
ERYTHROCYTE [DISTWIDTH] IN BLOOD BY AUTOMATED COUNT: 12.4 % (ref 10–15)
GFR SERPL CREATININE-BSD FRML MDRD: >90 ML/MIN/{1.73_M2}
HCT VFR BLD AUTO: 41 % (ref 35–47)
HGB BLD-MCNC: 13.5 G/DL (ref 11.7–15.7)
LYMPHOCYTES # BLD AUTO: 2 10E9/L (ref 0.8–5.3)
LYMPHOCYTES NFR BLD AUTO: 40.5 %
MCH RBC QN AUTO: 32.4 PG (ref 26.5–33)
MCHC RBC AUTO-ENTMCNC: 32.9 G/DL (ref 31.5–36.5)
MCV RBC AUTO: 98 FL (ref 78–100)
MONOCYTES # BLD AUTO: 0.5 10E9/L (ref 0–1.3)
MONOCYTES NFR BLD AUTO: 9.3 %
NEUTROPHILS # BLD AUTO: 2.1 10E9/L (ref 1.6–8.3)
NEUTROPHILS NFR BLD AUTO: 42.5 %
PLATELET # BLD AUTO: 203 10E9/L (ref 150–450)
PROT SERPL-MCNC: 6.6 G/DL (ref 6.8–8.8)
RBC # BLD AUTO: 4.17 10E12/L (ref 3.8–5.2)
WBC # BLD AUTO: 5 10E9/L (ref 4–11)

## 2020-09-18 PROCEDURE — 80076 HEPATIC FUNCTION PANEL: CPT | Performed by: FAMILY MEDICINE

## 2020-09-18 PROCEDURE — 99214 OFFICE O/P EST MOD 30 MIN: CPT | Performed by: NURSE PRACTITIONER

## 2020-09-18 PROCEDURE — 82565 ASSAY OF CREATININE: CPT | Performed by: FAMILY MEDICINE

## 2020-09-18 PROCEDURE — 84520 ASSAY OF UREA NITROGEN: CPT | Performed by: FAMILY MEDICINE

## 2020-09-18 PROCEDURE — 85025 COMPLETE CBC W/AUTO DIFF WBC: CPT | Performed by: FAMILY MEDICINE

## 2020-09-18 PROCEDURE — 36415 COLL VENOUS BLD VENIPUNCTURE: CPT | Performed by: FAMILY MEDICINE

## 2020-09-18 RX ORDER — FLUTICASONE PROPIONATE 110 UG/1
1 AEROSOL, METERED RESPIRATORY (INHALATION) 2 TIMES DAILY
Qty: 12 G | Refills: 11 | Status: SHIPPED | OUTPATIENT
Start: 2020-09-18 | End: 2023-10-11

## 2020-09-18 RX ORDER — DIAZEPAM 2 MG
2-4 TABLET ORAL ONCE
Qty: 2 TABLET | Refills: 0 | Status: SHIPPED | OUTPATIENT
Start: 2020-09-18 | End: 2020-09-18

## 2020-09-18 RX ORDER — PREDNISONE 20 MG/1
40 TABLET ORAL DAILY
Qty: 10 TABLET | Refills: 0 | Status: SHIPPED | OUTPATIENT
Start: 2020-09-18 | End: 2020-09-23

## 2020-09-18 RX ORDER — CLONAZEPAM 0.5 MG/1
0.5 TABLET ORAL DAILY PRN
Qty: 45 TABLET | Refills: 0 | Status: SHIPPED | OUTPATIENT
Start: 2020-09-18 | End: 2020-09-22

## 2020-09-18 RX ORDER — LIDOCAINE 50 MG/G
1 PATCH TOPICAL EVERY 24 HOURS
Qty: 30 PATCH | Refills: 1 | Status: SHIPPED | OUTPATIENT
Start: 2020-09-18 | End: 2022-06-14

## 2020-09-18 RX ORDER — CYCLOBENZAPRINE HCL 10 MG
10 TABLET ORAL
Qty: 30 TABLET | Refills: 1 | Status: SHIPPED | OUTPATIENT
Start: 2020-09-18 | End: 2022-06-14

## 2020-09-18 NOTE — TELEPHONE ENCOUNTER
Reason for call:  Other   Patient called regarding (reason for call): prescription  Additional comments: Pharmacy is calling regarding a drug interactions in between with the Diazepam, Suboxone and Klonopin. Please call to advise.     Phone number to reach patient:  Other phone number:  [phone]    Best Time:  Any     Can we leave a detailed message on this number?  YES    Travel screening: Not Applicable

## 2020-09-18 NOTE — PATIENT INSTRUCTIONS
Please schedule with neurology for further evaluation of your memory, hand paralysis, tremors.  Be your own advocate.  Have them make a treatment plan for you and figure out why you have your current symptoms.  Discuss chronic lymes    Schedule with rheumatology to discuss your symptoms and chronic lymes    Schedule with psychiatry    Take anxiety medication sparingly.     Follow up as needed.

## 2020-09-18 NOTE — PROGRESS NOTES
Subjective     Francisca Reed is a 35 year old female who presents to clinic today for the following health issues:    HPI       Concern - Follow up L shoulder pain  Onset: 6 months, getting worse last 2 months  Description: Throbbing pain in L shoulder. Pain radiating into hands and at times neck, causing tingling. Left arm and hand weakness. Limited ROM, cannot lift higher than waist level today, as her arm pain is worse due to showering earlier today. She is using ibuprofen 800 mg and tylenol 1000 mg for pain.     Right hand seems to be paralyzed.  Was not to wear a brace.  In the mean time she has lost mobility- almost seems to be genie. Did get prednisone taper when she was in the ER and this did help some with her R hand palsy at the time. Discussed need for physical therapy.     Her concentration and memory has declined    She does not feel her anxiety/ depression is working well, wants a referral for psychiatry for medication management.  Would like a PRN anxiety medication for panic attack and for the dentist. Does report a hx of abuse of pain medications due to old spinal fracture, on suboxone.  Has never had addiction issues with anxiolytics. Discussed in front of  and son.     Wondering if we can increase her dose of baclofen or add a second muscle relaxer at bedtime.  Has tremors 24/7.   describes her sleep, like she is having a seizure or being electrocuted.     Her asthma is not controlled, wheezing.  Only using albuterol.     She was found to have chronic lymes disease by neurologist in [place].  She was supposed to have EMG but it did not get scheduled.     She did have a CT and MRI of brain while at the hospital which was normal per pt.       Review of Systems   Constitutional, HEENT, cardiovascular, pulmonary, GI, , musculoskeletal, neuro, skin, endocrine and psych systems are negative, except as otherwise noted.      Objective    LMP  (LMP Unknown)   There is no height or  weight on file to calculate BMI.  Physical Exam   GENERAL: healthy, alert and no distress  EYES: Eyes grossly normal to inspection, PERRL and conjunctivae and sclerae normal  NECK: no adenopathy, no asymmetry, masses, or scars and thyroid normal to palpation  RESP: POSITIVE exp wheeze throughout.   CV: regular rate and rhythm, normal S1 S2, no S3 or S4, no murmur, click or rub, no peripheral edema and peripheral pulses strong  MS: POSITIVE for significantly decreased ROM of left arm due to shoulder pain. Pt not able to independently lift her arm above 45 degrees. No mobility of R hand with onset of contracture   NEURO: POSITIVE weakness in bilateral arms and hands. Obvious bodily tremors present.   PSYCH: mentation appears normal, POSITIVE for anxious, concerned.  Pt was worried she had MS like her grandfather, scans were normal, she is concerned- not sure why she is having these symptoms.         Assessment & Plan     Francisca was seen today for shoulder pain.    Diagnoses and all orders for this visit:    Axillary nerve palsy  -     NEUROLOGY ADULT REFERRAL  -     Orthopedic & Spine  Referral; Future  -     PHYSICAL THERAPY REFERRAL; Future  -     RHEUMATOLOGY REFERRAL; Future  -     predniSONE (DELTASONE) 20 MG tablet; Take 2 tablets (40 mg) by mouth daily for 5 days    Memory loss  -     NEUROLOGY ADULT REFERRAL  -     RHEUMATOLOGY REFERRAL; Future    Tremors of nervous system  -     NEUROLOGY ADULT REFERRAL  -     RHEUMATOLOGY REFERRAL; Future  -     cyclobenzaprine (FLEXERIL) 10 MG tablet; Take 1 tablet (10 mg) by mouth nightly as needed for muscle spasms    Adjustment disorder with mixed anxiety and depressed mood  -     MENTAL HEALTH REFERRAL  - Adult; Psychiatry; Psychiatry; [facility]: Collaborative Care Psychiatry Service/Bridge to Long-Term Psychiatry as indicated ([phone]); Yes; Chronic Mental Health without improvement; Yes; We will contact you to schedule ...    Chronic left shoulder pain  -      lidocaine (LIDODERM) 5 % patch; Place 1 patch onto the skin every 24 hours To prevent lidocaine toxicity, patient should be patch free for 12 hrs daily.  -     Orthopedic & Spine  Referral; Future  -     PHYSICAL THERAPY REFERRAL; Future    Impingement syndrome of shoulder region, left  -     lidocaine (LIDODERM) 5 % patch; Place 1 patch onto the skin every 24 hours To prevent lidocaine toxicity, patient should be patch free for 12 hrs daily.  -     Orthopedic & Spine  Referral; Future  -     PHYSICAL THERAPY REFERRAL; Future  -     predniSONE (DELTASONE) 20 MG tablet; Take 2 tablets (40 mg) by mouth daily for 5 days    Other neurologic disorders in Lyme disease  -     RHEUMATOLOGY REFERRAL; Future  -     predniSONE (DELTASONE) 20 MG tablet; Take 2 tablets (40 mg) by mouth daily for 5 days    Opioid use disorder (H)    Compression fracture of lumbar vertebra, unspecified lumbar vertebral level, sequela    Mild intermittent asthma without complication  -     fluticasone (FLOVENT HFA) 110 MCG/ACT inhaler; Inhale 1 puff into the lungs 2 times daily    Panic attack  -     clonazePAM (KLONOPIN) 0.5 MG tablet; Take 1 tablet (0.5 mg) by mouth daily as needed for anxiety (For panic attack only- can be habit forming.)  -     naloxone (NARCAN) 4 MG/0.1ML nasal spray; Spray 1 spray (4 mg) into one nostril alternating nostrils once as needed for opioid reversal every 2-3 minutes until assistance arrives    Situational anxiety  -     diazepam (VALIUM) 2 MG tablet; Take 1-2 tablets (2-4 mg) by mouth once for 1 dose Before dentist. Need to have a .           See Patient Instructions: Please schedule with neurology for further evaluation of your memory, hand paralysis, tremors.  Be your own advocate.  Have them make a treatment plan for you and figure out why you have your current symptoms.  Discuss chronic lymes    Schedule with rheumatology to discuss your symptoms and chronic lymes    Schedule with  psychiatry    Take anxiety medication sparingly.     Follow up as needed.     Return in about 4 weeks (around 10/16/2020), or if symptoms worsen or fail to improve.    Marielle Thompson, NISREEN  [facility]

## 2020-09-21 ENCOUNTER — MYC REFILL (OUTPATIENT)
Dept: FAMILY MEDICINE | Facility: CLINIC | Age: 35
End: 2020-09-21

## 2020-09-21 ENCOUNTER — TELEPHONE (OUTPATIENT)
Dept: FAMILY MEDICINE | Facility: CLINIC | Age: 35
End: 2020-09-21

## 2020-09-21 DIAGNOSIS — G89.29 CHRONIC BACK PAIN, UNSPECIFIED BACK LOCATION, UNSPECIFIED BACK PAIN LATERALITY: ICD-10-CM

## 2020-09-21 DIAGNOSIS — M54.9 CHRONIC BACK PAIN, UNSPECIFIED BACK LOCATION, UNSPECIFIED BACK PAIN LATERALITY: ICD-10-CM

## 2020-09-21 DIAGNOSIS — S32.000S COMPRESSION FRACTURE OF LUMBAR VERTEBRA, UNSPECIFIED LUMBAR VERTEBRAL LEVEL, SEQUELA: ICD-10-CM

## 2020-09-21 RX ORDER — IBUPROFEN 800 MG/1
800 TABLET, FILM COATED ORAL EVERY 8 HOURS PRN
Qty: 60 TABLET | Refills: 1 | Status: CANCELLED | OUTPATIENT
Start: 2020-09-21

## 2020-09-21 RX ORDER — GABAPENTIN 300 MG/1
CAPSULE ORAL
Qty: 150 CAPSULE | Refills: 0 | Status: SHIPPED | OUTPATIENT
Start: 2020-09-21 | End: 2020-10-23

## 2020-09-21 RX ORDER — BACLOFEN 20 MG/1
20 TABLET ORAL 3 TIMES DAILY
Qty: 90 TABLET | Refills: 1 | Status: CANCELLED | OUTPATIENT
Start: 2020-09-21

## 2020-09-21 NOTE — TELEPHONE ENCOUNTER
Patient was seen last Friday for her refill of Ibuprofen and Baclofin. She will need Gabapentin refill soon as well. Pharmacy told her to callvclinicas they have not heard from the clinic. Ok to leave a detailed message.

## 2020-09-21 NOTE — TELEPHONE ENCOUNTER
Michael states she received the prescriptions for Diazepam and Clonazepam for patient and wanted to know if you knew that she is also on Soboxone.  Please call.    Thank you.

## 2020-09-21 NOTE — TELEPHONE ENCOUNTER
Refilled but patient was most recently seen by her PCP and was given several referrals. Future refills need to go through her as she is a complicated patient and really needs one provider doing all refills when possible    Terra

## 2020-09-21 NOTE — TELEPHONE ENCOUNTER
She saw another provider more recently (on 9/18/2020) - she has a complicated history and refills really should be done by one provider and someone who is most recently familiar with what is going on.     Terra

## 2020-09-21 NOTE — TELEPHONE ENCOUNTER
Terra Schwartz is prescribing provider for all requested medications. Will route to her for review.     Routing refill request to provider for review/approval because:  Drug not on the [facility] refill protocol     Last Written Prescription Date:  7/24/20  Last Fill Quantity: 210,   refills: 1     Last office visit: 7/24/20 with prescribing provider:  Terra Schwartz PA-C     Future Office Visit:  None    Ivana Duggan RN BSN

## 2020-09-21 NOTE — TELEPHONE ENCOUNTER
Prior Authorization Retail Medication Request    Medication/Dose: lidocaine (LIDODERM) 5 % patch   ICD code (if different than what is on RX):  M25.512, G89.29, M75.42  Previously Tried and Failed:    Rationale:      Insurance Name:  [facility]  Insurance ID:  [id number]       Pharmacy Information (if different than what is on RX)  Name:  [facility]  Phone:  [phone]

## 2020-09-22 RX ORDER — BACLOFEN 20 MG/1
20 TABLET ORAL 3 TIMES DAILY
Qty: 90 TABLET | Refills: 1 | Status: SHIPPED | OUTPATIENT
Start: 2020-09-22 | End: 2020-11-18

## 2020-09-22 RX ORDER — IBUPROFEN 800 MG/1
800 TABLET, FILM COATED ORAL EVERY 8 HOURS PRN
Qty: 60 TABLET | Refills: 1 | Status: SHIPPED | OUTPATIENT
Start: 2020-09-22 | End: 2020-11-18

## 2020-09-22 NOTE — TELEPHONE ENCOUNTER
Called pharmacy, cancelled/discontinued clonazepam. Advised pharmacy that Vistaril was for dental procedure. Given pharmacy message below verbatim. Pharmacy verbalized understanding and agrees with plan.     Nancy Rogers, RN, BSN

## 2020-09-22 NOTE — TELEPHONE ENCOUNTER
PA Initiation    Medication: lidocaine (LIDODERM) 5 % patch   Insurance Company: [employer] - Phone [phone] Fax [phone]  Pharmacy Filling the Rx: [id number] - [place], [place] - [address]  Filling Pharmacy Phone: [phone]  Filling Pharmacy Fax: [phone]  Start Date: 9/22/2020

## 2020-09-22 NOTE — TELEPHONE ENCOUNTER
I'm not sure who Hadeel is.  Pt reports hx of abuse of narcotics, and denied abuse of anxiolytics in front of spouse.  [facility] will be calling to discontinue clonazepam at pharmacy.  Valium is an as needed for a dental procedure.  WILDER Peña, FNP-BC

## 2020-09-22 NOTE — TELEPHONE ENCOUNTER
PRIOR AUTHORIZATION DENIED    Medication: lidocaine (LIDODERM) 5 % patch--DENIED    Denial Date: 9/22/2020    Denial Rational: Diagnosis is not a medically accepted indication.     Appeal Information:

## 2020-09-22 NOTE — TELEPHONE ENCOUNTER
Called pharmacy, given message below verbatim, pharmacy verbalized understanding and agrees with plan.     Nancy Rogers, RN, BSN

## 2020-09-22 NOTE — TELEPHONE ENCOUNTER
Please call the pharmacy and discontinue clonazepam, the vistaril was for a dental procedure.  Pt denied issues with abuse of anxiolytic medications, just narcotics. WILDER Peña, FNP-BC

## 2020-09-22 NOTE — TELEPHONE ENCOUNTER
PA denied, from pharmacy list of covered alternatives are:   Duloxetine HCL,Gabapentin,Pregabalin or savella.

## 2020-09-23 ENCOUNTER — TELEPHONE (OUTPATIENT)
Dept: FAMILY MEDICINE | Facility: CLINIC | Age: 35
End: 2020-09-23

## 2020-09-23 ENCOUNTER — TELEPHONE (OUTPATIENT)
Dept: NEUROLOGY | Facility: CLINIC | Age: 35
End: 2020-09-23

## 2020-09-23 NOTE — TELEPHONE ENCOUNTER
Please call pt.  Patient can use OTC pain patches or topical medications. I was also notified by pharmacy that she should not take anxiolytics such as clonazepam or valium with her suboxone.  WILDER Peña, FNP-BC

## 2020-09-23 NOTE — TELEPHONE ENCOUNTER
There is nothing else safe that I can prescribe.  She should follow up with pain management. WILDER ePña, FNP-BC

## 2020-09-23 NOTE — TELEPHONE ENCOUNTER
PLAN DOES NOT COVER MEDICATION PRESCRIBED.  PER RX ALTERNATIVE MEDICATIONS INCLUDE:  DULOXETINE HCL PLEASE FAX BACK WITH APPROVAL ALONG WITH STRENGTH, DIRECTIONS, QUANTITY, AND REFILLS.    [facility] -[phone]

## 2020-09-23 NOTE — TELEPHONE ENCOUNTER
Francisca has video appointment scheduled with neurology on Monday 9/28/20 to follow up her wrist drop.  I called to see if she was able to get her EMG that was suggested at her last visit.  She said she's got it scheduled for next week, but wants to keep the neurology appointment nevertheless.  She said she's had left shoulder pain since the middle of August.  She doesn't recall a specific injury, but the pain has gotten progressively worse.  She says she cries daily with the pain.      Her primary has referred her to Neurology for this.  She'd like to address this issue at her appointment 9/28/20.

## 2020-09-23 NOTE — TELEPHONE ENCOUNTER
Spoke with pharmacy, lidocaine patch not covered. Please complete prior auth.     lidocaine (LIDODERM) 5 % patch  30 patch  1  9/18/2020   --    Sig - Route: Place 1 patch onto the skin every 24 hours To prevent lidocaine toxicity, patient should be patch free for 12 hrs daily. - Transdermal    Sent to pharmacy as: Lidocaine 5 % External Patch (LIDODERM)

## 2020-09-23 NOTE — TELEPHONE ENCOUNTER
Pt said she has tried the OTC patches and they do not work. She is in a lot of pain and has not slept, wanting to know if there is anything else you could prescribe. Please advise.     Also informed pt regarding not taking clonazepam or Valium, pt voiced understanding.

## 2020-09-28 ENCOUNTER — PATIENT OUTREACH (OUTPATIENT)
Dept: CARE COORDINATION | Facility: CLINIC | Age: 35
End: 2020-09-28

## 2020-09-28 ENCOUNTER — TELEPHONE (OUTPATIENT)
Dept: FAMILY MEDICINE | Facility: CLINIC | Age: 35
End: 2020-09-28

## 2020-09-28 ENCOUNTER — VIRTUAL VISIT (OUTPATIENT)
Dept: NEUROLOGY | Facility: CLINIC | Age: 35
End: 2020-09-28
Attending: NURSE PRACTITIONER
Payer: COMMERCIAL

## 2020-09-28 DIAGNOSIS — M21.331 WRIST DROP, RIGHT: ICD-10-CM

## 2020-09-28 DIAGNOSIS — R20.2 PARESTHESIAS: ICD-10-CM

## 2020-09-28 DIAGNOSIS — M25.512 LEFT SHOULDER PAIN, UNSPECIFIED CHRONICITY: Primary | ICD-10-CM

## 2020-09-28 PROCEDURE — 99213 OFFICE O/P EST LOW 20 MIN: CPT | Mod: 95 | Performed by: PSYCHIATRY & NEUROLOGY

## 2020-09-28 NOTE — PATIENT INSTRUCTIONS
Patient Instructions:  -- MRI Left shoulder.  -- Nerve conduction studies/EMG as planned, tomorrow (9/29). We will notify you of the test results and make further recommendations based on what the tests reveal.  -- Increase the Gabapentin to 900mg 3 times daily. Try to take the same amount, consistently.   -- Follow-up with your primary care provider regarding next steps in pain management.   -- Make an appointment in Neurology clinic for 6 weeks from now.

## 2020-09-28 NOTE — LETTER
"    9/28/2020         RE: Francisca Reed  [address] [facility] [address]        Dear Colleague,    Thank you for referring your patient, Francisca Reed, to the [facility]. Please see a copy of my visit note below.    Francisca Reed is a 35 year old female who is being evaluated via a billable phone visit.      The patient has been notified of following:     \"This telephone visit will be conducted via a call between you and your physician/provider. We have found that certain health care needs can be provided without the need for an in-person physical exam.  This service lets us provide the care you need with a telephone conversation.  If a prescription is necessary we can send it directly to your pharmacy.  If lab work is needed we can place an order for that and you can then stop by our lab to have the test done at a later time.    Telephone visits are billed at different rates depending on your insurance coverage.  Please reach out to your insurance provider with any questions.    If during the course of the call the physician/provider feels a telephone visit is not appropriate, you will not be charged for this service.\"    Patient has given verbal consent for telephone visit? Yes  How would you like to obtain your AVS? Mail a copy  If you are dropped from the video visit, the video invite should be resent to [phone]  Will anyone else be joining your video visit? No        Phone call duration: 16 minutes.  *Please see accompanying physician note for visit details  Vickie Al MD          Physician Note:    Francisca is followed in Neurology for Right hand weakness and sensory changes. I have not met Francisca in person as of yet, but we did have an initial consultation via video last month.    As previously documented by me (8.6.20):  Per Dr. Amado's 7.9.20 note:  She reports intermittent loss of strength and sensation of right upper and lower distal extremities, loss of " sensation in left upper extremity. Right hand and foot will involuntary pronate, she is unable to grasp. Will gradually recover after a few days. Present for the past 6 months though getting more frequent. She is worried about MS.      Strength exam recorded form that visit indicates some weakness in the Right hand.      The patient also saw Cindy Tierney on 7.24.20 for the difficulties with her Right hand. Described as wrist drop on exam. Inflammatory labs were negative, as was Lyme test. CK was elevated: 305. Brain MRI was unremarkable. Prior cervical spine imaging also reported as unremarkable (outside ED, imaging not available). It appears that she was also referred for electromyogram but I do not see any results in the chart     The patient has additional history of polysubstance use, PTSD, lumbar fracture, asthma and chronic back pain.      I spoke with Francisca via video conference today. She tells me that about 8 months ago, she started having pain and tingling from the Left shoulder down into the arm and hand. No history of neck injury preceding this. She then woke with weakness at the Right wrist about one month ago. No prior injury. There was some minor pain/discomfort though. She says the gabapentin does help some with the pain. She takes 600/600/900mg and denies side effects. She asks if there is more she can take for the pain.     She reports that she has been wearing a wrist splint and has noticed that she can move her fingers now, but the hand still flops down at the wrist.     She has some tingling in the feet and cramping of the Right foot at times. She has some double vision at times. No problems with swallow.      She says she has been trying to set up the electromyogram at our clinic in [place] but has had trouble reaching them.      She says she is not currently using any illegal substances. She does use tobacco.      Her paternal grandfather had MS. Otherwise no known Neurologic  disorders in her family.     She had wrist drop on the Right on limited exam, with some ability to move the fingers. I recommended she do the nerve conduction studies/EMG as planned. The test is scheduled by report, for tomorrow. She has complained of increased shoulder pain in the interim.     I also recommended that Francisca increase the gabapentin to 900mg TID and then follow-up with me after the testing was completed. Her notes today indicate she is still on the lower 600/600/900mg dose schedule. Per chart review, her primary care provider has recommended Francisca see a pain specialist.     I spoke with Francisca over the phone today because she was having trouble getting the video to work due to poor reception.     She says that she still has the wrist thing, but her current problem is Left shoulder pain. This started in August, without inciting injury. She says she went to the ED, so I looked up the encounter from 9/20/20.    Per Care Everywhere:  female who presents today with left-sided shoulder pain. This is been ongoing since April, states that it is gotten worse over the last 3 to 4 days. She states that she has had multiple issues with her upper extremities having some burning and tingling sensations as well as weakness, primarily on the right side but she does have some tingling in the left side as well. She states that she was worked up by neurology and had neuroimaging of her head and neck with no definitive diagnosis. She states that there was some suspicion for chronic Lyme disease, states that since this is been discussed she has been falling a lot and states that she has fallen multiple times in the last couple of weeks. No head injuries. She is concerned about broken bone In the shoulder.  Of note patient saw her primary care provider 2 days ago and was given prednisone and muscle relaxer. States that she is in so much pain that she cannot sleep. She has a history of opioid dependence, currently  being treated with Suboxone. She contacted her primary care provider for Suboxone refill on 9/17.      Left shoulder XR was normal.     ASSESSMENT:   Chronic pain of the left shoulder since April. Patient is afebrile with stable vital signs. She is tachycardic at 115, states that she has issues with anxiety and she is in pain. She is nontoxic in appearance, however she does appear to be very medicated. Her movements and speech are pressured and exaggerated, she is seems to be a little histrionic while I am performing my physical examination, even very light touching elicits winces and gasps of pain.  Given her history of opioid abuse and current Suboxone treatment, as well as chronic nature of this pain opioid medications are not indicated. She did wish to have x-rays, these were obtained and show no fracture dislocation per my independent read, radiology report agrees with this. She will continue with current medications and follow-up as needed.    I also reviewed the primary care provider's 9/18 note, which indicates that Francisca has also been referred to Rheumatology, Mental health and physical therapy.    Francisca tells me that nothing is helping for the pain and everyone just thinks she is drug-seeking.    She says that she takes variable amounts of gabapentin and says this does help some. She did not increase the gabapentin to 900mg TID as I had suggested because she was afraid she would run out. She used to follow with a pain provider but no longer has one.     The wrist and hand on the Right are the same, she tells me. She continues to have radiating pain and tingling into the arms. She confirms that she is scheduled for her electromyogram at [facility] in [place] tomorrow.     Current Outpatient Medications   Medication     acetaminophen (TYLENOL) 650 MG CR tablet     albuterol (PROAIR HFA/PROVENTIL HFA/VENTOLIN HFA) 108 (90 Base) MCG/ACT inhaler     baclofen (LIORESAL) 20 MG tablet     buprenorphine-naloxone  (SUBOXONE) 8-2 MG SUBL sublingual tablet     Calcium-Magnesium 100-50 MG TABS     cefuroxime (CEFTIN) 500 MG tablet     Cyanocobalamin (VITAMIN B-12 PO)     cyclobenzaprine (FLEXERIL) 10 MG tablet     escitalopram (LEXAPRO) 5 MG tablet     fluticasone (FLOVENT HFA) 110 MCG/ACT inhaler     gabapentin (NEURONTIN) 300 MG capsule     hydrOXYzine (VISTARIL) 50 MG capsule     ibuprofen (ADVIL/MOTRIN) 800 MG tablet     levonorgestrel (MIRENA) 20 MCG/24HR IUD     lidocaine (LIDODERM) 5 % patch     loratadine (WAL-ITIN) 10 MG tablet     MAGNESIUM PO     minocycline (MINOCIN) 100 MG capsule     naloxone (NARCAN) 4 MG/0.1ML nasal spray     ondansetron (ZOFRAN) 4 MG tablet     Probiotic Product (PROBIOTIC DAILY PO)     rifampin (RIFADIN) 300 MG capsule     UNABLE TO FIND     VITAMIN D PO     No current facility-administered medications for this visit.        Assessment and Plan:  Encounter Diagnoses   Name Primary?     Left shoulder pain, unspecified chronicity Yes     Paresthesias      Wrist drop, right        Ms. Reed is a 36 yo woman whom I was asked to see in consultation for Right wrist drop. Our plan when we spoke about 6 weeks ago, was to get nerve conduction studies/EMG and increase the gabapentin she was taking for discomfort. Neither has yet to have been done and now she has a new problem concerning Left shoulder pain. I have ordered an MRI of the shoulder and Francisca will come in for the electromyogram as scheduled tomorrow. I encouraged her to also make another appointment with her primary care provider to discuss the shoulder issue. In reviewing the chart I suspect there may be some somatization, but my assessment is limited in that I have never met the patient in person. I did recommend she try increasing the gabapentin to 900mg TID as previously suggested. Will plan to follow-up in clinic in 6-8 weeks.     Patient Instructions:  -- MRI Left shoulder.  -- Nerve conduction studies/EMG as planned, tomorrow (9/29).  We will notify you of the test results and make further recommendations based on what the tests reveal.  -- Increase the Gabapentin to 900mg 3 times daily. Try to take the same amount, consistently.   -- Follow-up with your primary care provider regarding next steps in pain management.   -- Make an appointment in Neurology clinic for 6 weeks from now.     Phone call duration: 16 minutes. Additional 15 minutes were spent in chart review and documentation by me.    Vickie Al MD  Neurology    CC: Marielle Thompson NP      Again, thank you for allowing me to participate in the care of your patient.        Sincerely,        Vickie Al MD

## 2020-09-28 NOTE — PROGRESS NOTES
"Francisca Reed is a 35 year old female who is being evaluated via a billable phone visit.      The patient has been notified of following:     \"This telephone visit will be conducted via a call between you and your physician/provider. We have found that certain health care needs can be provided without the need for an in-person physical exam.  This service lets us provide the care you need with a telephone conversation.  If a prescription is necessary we can send it directly to your pharmacy.  If lab work is needed we can place an order for that and you can then stop by our lab to have the test done at a later time.    Telephone visits are billed at different rates depending on your insurance coverage.  Please reach out to your insurance provider with any questions.    If during the course of the call the physician/provider feels a telephone visit is not appropriate, you will not be charged for this service.\"    Patient has given verbal consent for telephone visit? Yes  How would you like to obtain your AVS? Mail a copy  If you are dropped from the video visit, the video invite should be resent to [phone]  Will anyone else be joining your video visit? No        Phone call duration: 16 minutes.  *Please see accompanying physician note for visit details  Vickie Al MD        "

## 2020-09-28 NOTE — PROGRESS NOTES
Physician Note:    Francisca is followed in Neurology for Right hand weakness and sensory changes. I have not met Francisca in person as of yet, but we did have an initial consultation via video last month.    As previously documented by me (8.6.20):  Per Dr. Amado's 7.9.20 note:  She reports intermittent loss of strength and sensation of right upper and lower distal extremities, loss of sensation in left upper extremity. Right hand and foot will involuntary pronate, she is unable to grasp. Will gradually recover after a few days. Present for the past 6 months though getting more frequent. She is worried about MS.      Strength exam recorded form that visit indicates some weakness in the Right hand.      The patient also saw Cindy Tierney on 7.24.20 for the difficulties with her Right hand. Described as wrist drop on exam. Inflammatory labs were negative, as was Lyme test. CK was elevated: 305. Brain MRI was unremarkable. Prior cervical spine imaging also reported as unremarkable (outside ED, imaging not available). It appears that she was also referred for electromyogram but I do not see any results in the chart     The patient has additional history of polysubstance use, PTSD, lumbar fracture, asthma and chronic back pain.      I spoke with Francisca via video conference today. She tells me that about 8 months ago, she started having pain and tingling from the Left shoulder down into the arm and hand. No history of neck injury preceding this. She then woke with weakness at the Right wrist about one month ago. No prior injury. There was some minor pain/discomfort though. She says the gabapentin does help some with the pain. She takes 600/600/900mg and denies side effects. She asks if there is more she can take for the pain.     She reports that she has been wearing a wrist splint and has noticed that she can move her fingers now, but the hand still flops down at the wrist.     She has some tingling in the feet and  cramping of the Right foot at times. She has some double vision at times. No problems with swallow.      She says she has been trying to set up the electromyogram at our clinic in [place] but has had trouble reaching them.      She says she is not currently using any illegal substances. She does use tobacco.      Her paternal grandfather had MS. Otherwise no known Neurologic disorders in her family.     She had wrist drop on the Right on limited exam, with some ability to move the fingers. I recommended she do the nerve conduction studies/EMG as planned. The test is scheduled by report, for tomorrow. She has complained of increased shoulder pain in the interim.     I also recommended that Francisca increase the gabapentin to 900mg TID and then follow-up with me after the testing was completed. Her notes today indicate she is still on the lower 600/600/900mg dose schedule. Per chart review, her primary care provider has recommended Francisca see a pain specialist.     I spoke with Francisca over the phone today because she was having trouble getting the video to work due to poor reception.     She says that she still has the wrist thing, but her current problem is Left shoulder pain. This started in August, without inciting injury. She says she went to the ED, so I looked up the encounter from 9/20/20.    Per Care Everywhere:  female who presents today with left-sided shoulder pain. This is been ongoing since April, states that it is gotten worse over the last 3 to 4 days. She states that she has had multiple issues with her upper extremities having some burning and tingling sensations as well as weakness, primarily on the right side but she does have some tingling in the left side as well. She states that she was worked up by neurology and had neuroimaging of her head and neck with no definitive diagnosis. She states that there was some suspicion for chronic Lyme disease, states that since this is been discussed she  has been falling a lot and states that she has fallen multiple times in the last couple of weeks. No head injuries. She is concerned about broken bone In the shoulder.  Of note patient saw her primary care provider 2 days ago and was given prednisone and muscle relaxer. States that she is in so much pain that she cannot sleep. She has a history of opioid dependence, currently being treated with Suboxone. She contacted her primary care provider for Suboxone refill on 9/17.      Left shoulder XR was normal.     ASSESSMENT:   Chronic pain of the left shoulder since April. Patient is afebrile with stable vital signs. She is tachycardic at 115, states that she has issues with anxiety and she is in pain. She is nontoxic in appearance, however she does appear to be very medicated. Her movements and speech are pressured and exaggerated, she is seems to be a little histrionic while I am performing my physical examination, even very light touching elicits winces and gasps of pain.  Given her history of opioid abuse and current Suboxone treatment, as well as chronic nature of this pain opioid medications are not indicated. She did wish to have x-rays, these were obtained and show no fracture dislocation per my independent read, radiology report agrees with this. She will continue with current medications and follow-up as needed.    I also reviewed the primary care provider's 9/18 note, which indicates that Francisca has also been referred to Rheumatology, Mental health and physical therapy.    Francisca tells me that nothing is helping for the pain and everyone just thinks she is drug-seeking.    She says that she takes variable amounts of gabapentin and says this does help some. She did not increase the gabapentin to 900mg TID as I had suggested because she was afraid she would run out. She used to follow with a pain provider but no longer has one.     The wrist and hand on the Right are the same, she tells me. She continues to  have radiating pain and tingling into the arms. She confirms that she is scheduled for her electromyogram at [facility] in [place] tomorrow.     Current Outpatient Medications   Medication     acetaminophen (TYLENOL) 650 MG CR tablet     albuterol (PROAIR HFA/PROVENTIL HFA/VENTOLIN HFA) 108 (90 Base) MCG/ACT inhaler     baclofen (LIORESAL) 20 MG tablet     buprenorphine-naloxone (SUBOXONE) 8-2 MG SUBL sublingual tablet     Calcium-Magnesium 100-50 MG TABS     cefuroxime (CEFTIN) 500 MG tablet     Cyanocobalamin (VITAMIN B-12 PO)     cyclobenzaprine (FLEXERIL) 10 MG tablet     escitalopram (LEXAPRO) 5 MG tablet     fluticasone (FLOVENT HFA) 110 MCG/ACT inhaler     gabapentin (NEURONTIN) 300 MG capsule     hydrOXYzine (VISTARIL) 50 MG capsule     ibuprofen (ADVIL/MOTRIN) 800 MG tablet     levonorgestrel (MIRENA) 20 MCG/24HR IUD     lidocaine (LIDODERM) 5 % patch     loratadine (WAL-ITIN) 10 MG tablet     MAGNESIUM PO     minocycline (MINOCIN) 100 MG capsule     naloxone (NARCAN) 4 MG/0.1ML nasal spray     ondansetron (ZOFRAN) 4 MG tablet     Probiotic Product (PROBIOTIC DAILY PO)     rifampin (RIFADIN) 300 MG capsule     UNABLE TO FIND     VITAMIN D PO     No current facility-administered medications for this visit.        Assessment and Plan:  Encounter Diagnoses   Name Primary?     Left shoulder pain, unspecified chronicity Yes     Paresthesias      Wrist drop, right        Ms. Reed is a 34 yo woman whom I was asked to see in consultation for Right wrist drop. Our plan when we spoke about 6 weeks ago, was to get nerve conduction studies/EMG and increase the gabapentin she was taking for discomfort. Neither has yet to have been done and now she has a new problem concerning Left shoulder pain. I have ordered an MRI of the shoulder and Francisca will come in for the electromyogram as scheduled tomorrow. I encouraged her to also make another appointment with her primary care provider to discuss the shoulder issue. In reviewing  the chart I suspect there may be some somatization, but my assessment is limited in that I have never met the patient in person. I did recommend she try increasing the gabapentin to 900mg TID as previously suggested. Will plan to follow-up in clinic in 6-8 weeks.     Patient Instructions:  -- MRI Left shoulder.  -- Nerve conduction studies/EMG as planned, tomorrow (9/29). We will notify you of the test results and make further recommendations based on what the tests reveal.  -- Increase the Gabapentin to 900mg 3 times daily. Try to take the same amount, consistently.   -- Follow-up with your primary care provider regarding next steps in pain management.   -- Make an appointment in Neurology clinic for 6 weeks from now.     Phone call duration: 16 minutes. Additional 15 minutes were spent in chart review and documentation by me.    Vickie Al MD  Neurology    CC: Marielle Thompson NP

## 2020-09-28 NOTE — PROGRESS NOTES
Clinic Care Coordination Contact  [facility]/Voicemail    CHW Outreach attempted x 1.  Left message on patient's voicemail with call back information and requested return call.  Plan: CHW will try to reach patient again in 10 business days.    Thi COLBERT Community Health Worker  [facility] Clinic Care Coordination  [facility]  Phone: [phone]

## 2020-10-01 ENCOUNTER — MYC MEDICAL ADVICE (OUTPATIENT)
Dept: FAMILY MEDICINE | Facility: CLINIC | Age: 35
End: 2020-10-01

## 2020-10-01 DIAGNOSIS — F41.8 SITUATIONAL ANXIETY: ICD-10-CM

## 2020-10-01 RX ORDER — DIAZEPAM 2 MG
2-4 TABLET ORAL ONCE
Qty: 2 TABLET | Refills: 0 | Status: CANCELLED | OUTPATIENT
Start: 2020-10-01 | End: 2020-10-01

## 2020-10-07 ENCOUNTER — HOSPITAL ENCOUNTER (OUTPATIENT)
Dept: MRI IMAGING | Facility: CLINIC | Age: 35
Discharge: HOME | End: 2020-10-07
Attending: PSYCHIATRY & NEUROLOGY | Admitting: PSYCHIATRY & NEUROLOGY
Payer: COMMERCIAL

## 2020-10-07 DIAGNOSIS — M25.512 LEFT SHOULDER PAIN, UNSPECIFIED CHRONICITY: ICD-10-CM

## 2020-10-07 PROCEDURE — 73221 MRI JOINT UPR EXTREM W/O DYE: CPT | Mod: 26 | Performed by: RADIOLOGY

## 2020-10-07 PROCEDURE — 73221 MRI JOINT UPR EXTREM W/O DYE: CPT | Mod: LT

## 2020-10-09 NOTE — RESULT ENCOUNTER NOTE
Please advise TRACIE Albright 1985, that her shoulder MRI shows mild tendinosis (which I believe can be from overuse/inflammation). I can refer her to Orthopedics if for this she would like. I have not seen any electromyogram results yet. Has she had the test completed?  [phone] (home)     Vickie lA MD

## 2020-10-14 ENCOUNTER — PATIENT OUTREACH (OUTPATIENT)
Dept: CARE COORDINATION | Facility: CLINIC | Age: 35
End: 2020-10-14

## 2020-10-14 NOTE — LETTER
[facility]  [address]  [place] [address]  [phone]    10/19/2020      Francisca Reed  [address] [address] [address]      Dear  Francisca,      I have been attempting to reach you since our last contact. I would like to continue to work with you on the goals from our last conversation and provide the support you may need. I would appreciate if you would give me a call at [phone] and let me know if you would like to continue working together. I know that there are many things that can affect our ability to communicate and hope we can plan better moving forward.     All of us at [facility] are invested in your health and are here to assist you in meeting your goals.     Sincerely,      ELENA Reyna  [facility] - Care Coordination  [facility]   [phone]

## 2020-10-14 NOTE — LETTER
For continued care coordination I am sending the updated complex care plan.  I would like to provide you with the enclosed information for your records.  As part of care coordination, we are developing care plans to assist in accomplishing your health care goals.  When we speak next, please feel free to let me know if you want to add or change any information on your care plans.    As always, feel free to contact me if you have any questions or concerns.  I look forward to working with you in the effort to achieve your health care and wellness goals .        Sincerely,      Anne Bro, [facility]  Clinic Care Coordination  [phone]        [facility]  Complex Care Plan  About Me:    Patient Name:  Francisca Reed    YOB: 1985  Age:         35 year old   [place] MRN:    [id number] Telephone Information:  Home Phone [phone]   Mobile [phone]       Address:  [address] [place] [address] Email address:  [email or web address]      Emergency Contact(s)    Name Relationship Lgl Grd Work Phone Home Phone Mobile Phone   1. BHANU WILSON Father   [phone]    2. DENIS BISHOP Mother   [phone]    3. RYAN REED Spouse   [phone]            Primary language:  English     needed? No   [facility] [employer]:  [phone] op. 1  Other communication barriers: Other(memory and forgetful)  Preferred Method of Communication:     Current living arrangement:    Mobility Status/ Medical Equipment:      Health Maintenance  Health Maintenance Reviewed: Due/Overdue   Health Maintenance Due   Topic Date Due     ASTHMA CONTROL TEST  1985     Pneumococcal Vaccine: Pediatrics (0 to 5 Years) and At-Risk Patients (6 to 64 Years) (1 of 1 - PPSV23) 06/09/1993     PREVENTIVE CARE VISIT  02/08/2012     INFLUENZA VACCINE (1) 09/01/2020     Please talk with your provider about what is needed or can continue to wait.        My Access Plan  Medical Emergency  913   Primary Clinic Line [facility] [phone]   24 Hour Appointment Line [phone] or  [phone]) (toll-free)   24 Hour Nurse Line [phone] (toll-free)   Preferred Urgent Care     Preferred Hospital     Preferred Pharmacy [id number] - [place], [place] - [facility] [address] AT [facility] [address]     Behavioral Health Crisis Line The National Suicide Prevention Lifeline at 1-286.913.2003 or 911             My Care Team Members  Patient Care Team       Relationship Specialty Notifications Start End    Marielle Thompson NP PCP - General Nurse Practitioner - Family  1/9/20     Phone: [phone] Fax: [phone]         [address] [facility] [place] [address]    Anne Albarran APRN [facility]  Family Practice  9/6/19     Merged    Phone: [phone] Fax: [phone]         [address] [place] [address]    Anne Bro LSW Lead Care Coordinator Primary Care - CC Admissions 7/10/20     Phone: [phone] Fax: [phone]        Thi Joyce Community Health Worker Primary Care - CC Admissions 7/10/20     Phone: [phone]         Marielle Thompson NP Assigned PCP   9/27/20     Phone: [phone] Fax: [phone]         [address] [facility] [place] [address]            My Care Plans  Self Management and Treatment Plan  Goals and (Comments)  Goals        General    Improve chronic symptoms (pt-stated)     Notes - Note created  7/10/2020  2:16 PM by Anne Bro LSW    Goal Statement: I will attend my neurology appointment and follow up tests.   Date Goal set: 7/10/2020   Barriers: memory, anxiety, motivation  Strengths: I want to understand and improve situation  Date to Achieve By: 1/6/21   Patient expressed understanding of goal: yes  Action steps to achieve this goal:  1. I will attend my neurology appointment  2. I will follow up on recommendations tests                 Action Plans on File:                        Advance Care Plans/Directives Type:        My Medical and Care Information  Problem List   Patient Active Problem List   Diagnosis     Intrauterine fetal death     CARDIOVASCULAR SCREENING; LDL GOAL LESS THAN 160     Fracture of hip (H)     Compression fx, lumbar spine (H)     Papanicolaou smear of cervix with low grade squamous intraepithelial lesion (LGSIL)     Excoriation (skin-picking) disorder     Chronic back pain, unspecified back location, unspecified back pain laterality     Tobacco use disorder     Opioid use disorder (H)      Current Medications and Allergies:  Allergies as of 10/14/2020  Reviewed by Elaine Cai CMA on 9/28/2020   Severity Noted Reaction Type Reactions   Amoxicillin Not Specified 02/08/2011       Medications - This is your record. Keep this with you and show to your community pharmacist(s) and physician(s) at each visit.     Instructions for use   acetaminophen (TYLENOL) 650 MG CR tablet Take 1 tablet by mouth every 8 hours as needed for pain.   albuterol (PROAIR HFA/PROVENTIL HFA/VENTOLIN HFA) 108 (90 Base) MCG/ACT inhaler INHALE 2 PUFFS BY MOUTH EVERY 4 HOURS AS NEEDED FOR SHORTNESS OF BREATH   baclofen (LIORESAL) 20 MG tablet Take 1 tablet (20 mg) by mouth 3 times daily Due for follow up visit.   buprenorphine-naloxone (SUBOXONE) 8-2 MG SUBL sublingual tablet Place 1 tablet under the tongue daily 12mg q24hr dose, will take 1.5 tabs   Calcium-Magnesium 100-50 MG TABS Take 1 tablet by mouth 3 times daily Unsure strengths   cefuroxime (CEFTIN) 500 MG tablet TK 1 T PO BID WITH FOOD   Cyanocobalamin (VITAMIN B-12 PO) Take by mouth daily    cyclobenzaprine (FLEXERIL) 10 MG tablet Take 1 tablet (10 mg) by mouth nightly as needed for muscle spasms   escitalopram (LEXAPRO) 5 MG tablet 10 mg daily    fluticasone (FLOVENT HFA) 110 MCG/ACT inhaler Inhale 1 puff into the lungs 2 times daily   gabapentin (NEURONTIN) 300 MG capsule Take 2 capsules (600mg) in the morning or afternoon and 3  capsules (900mg) at bedtime    Patient taking differently: Take 2 capsules (600mg) in the morning or afternoon and 3 capsules (900mg) at bedtime   Taking 600mg in the morning, 600mg in the afternoon, and 900mg at bedtime.   hydrOXYzine (VISTARIL) 50 MG capsule Take 1 capsule (50 mg) by mouth 3 times daily as needed for itching   ibuprofen (ADVIL/MOTRIN) 800 MG tablet Take 1 tablet (800 mg) by mouth every 8 hours as needed for moderate pain   levonorgestrel (MIRENA) 20 MCG/24HR IUD 1 each by Intrauterine route once   lidocaine (LIDODERM) 5 % patch Place 1 patch onto the skin every 24 hours To prevent lidocaine toxicity, patient should be patch free for 12 hrs daily.   loratadine (WAL-ITIN) 10 MG tablet Take 10 mg by mouth daily   MAGNESIUM PO Take 200 mg by mouth daily    minocycline (MINOCIN) 100 MG capsule TK 1 C PO BID WITH FOOD. AVOID CALCIUM/MAGNESIUM/DAIRY WITHIN 2 HOURS OF TAKING. AVOID SUN   naloxone (NARCAN) 4 MG/0.1ML nasal spray Spray 1 spray (4 mg) into one nostril alternating nostrils once as needed for opioid reversal every 2-3 minutes until assistance arrives   ondansetron (ZOFRAN) 4 MG tablet Take 1-2 tablets (4-8 mg) by mouth every 8 hours as needed for nausea   Probiotic Product (PROBIOTIC DAILY PO) Take by mouth daily    rifampin (RIFADIN) 300 MG capsule TK 1 C PO BID WITH FOOD   UNABLE TO FIND daily MEDICATION NAME: Serrapeptace    VITAMIN D PO Take 1 tablet by mouth daily          Care Coordination Start Date: 7/10/2020   Frequency of Care Coordination: monthly   Form Last Updated: 10/14/2020

## 2020-10-14 NOTE — PROGRESS NOTES
Clinic Care Coordination Contact    Call placed to patient for update.  She said she was busy and asked if she could call back later.  We will wait for return phone call from patient and if no call then  will call in 3-5 business days.    ELENA Reyna, [facility] - Care Coordinator   [facility]  10/14/2020   10:17 AM  [phone]

## 2020-10-19 NOTE — PROGRESS NOTES
Clinic Care Coordination Contact  [facility]/Voicemail       Clinical Data: Care Coordinator Outreach  Outreach attempted x 1.  Voice mail box is full and unable to leave a message  Plan: Care Coordinator will send unable to contact letter with care coordinator contact information via [employer]. Care Coordinator will try to reach patient again in 1 month.    ELENA Reyna, [facility] - Care Coordinator   [facility]  10/19/2020   1:59 PM  [phone]

## 2020-10-23 ENCOUNTER — MYC MEDICAL ADVICE (OUTPATIENT)
Dept: FAMILY MEDICINE | Facility: CLINIC | Age: 35
End: 2020-10-23

## 2020-10-23 DIAGNOSIS — M54.9 CHRONIC BACK PAIN, UNSPECIFIED BACK LOCATION, UNSPECIFIED BACK PAIN LATERALITY: ICD-10-CM

## 2020-10-23 DIAGNOSIS — S32.000S COMPRESSION FRACTURE OF LUMBAR VERTEBRA, UNSPECIFIED LUMBAR VERTEBRAL LEVEL, SEQUELA: ICD-10-CM

## 2020-10-23 DIAGNOSIS — G89.29 CHRONIC BACK PAIN, UNSPECIFIED BACK LOCATION, UNSPECIFIED BACK PAIN LATERALITY: ICD-10-CM

## 2020-10-23 RX ORDER — GABAPENTIN 300 MG/1
CAPSULE ORAL
Qty: 210 CAPSULE | Refills: 1 | Status: SHIPPED | OUTPATIENT
Start: 2020-10-23 | End: 2020-11-13

## 2020-10-26 DIAGNOSIS — R11.0 NAUSEA: ICD-10-CM

## 2020-10-26 NOTE — TELEPHONE ENCOUNTER
Hard copy of RX for Gabapentin 300 mg capsule faxed to /[facility] [place].

## 2020-10-26 NOTE — TELEPHONE ENCOUNTER
Routing refill request to provider for review/approval because:  Drug not on the [facility] refill protocol   Marco Dwyer RN

## 2020-10-27 RX ORDER — ONDANSETRON 4 MG/1
TABLET, FILM COATED ORAL
Qty: 20 TABLET | Refills: 1 | Status: SHIPPED | OUTPATIENT
Start: 2020-10-27 | End: 2020-12-11

## 2020-10-28 NOTE — PROGRESS NOTES
"Francisca Reed is a 35 year old female who is being evaluated via a billable video visit.      The patient has been notified of following:     \"This video visit will be conducted via a call between you and your physician/provider. We have found that certain health care needs can be provided without the need for an in-person physical exam.  This service lets us provide the care you need with a video conversation.  If a prescription is necessary we can send it directly to your pharmacy.  If lab work is needed we can place an order for that and you can then stop by our lab to have the test done at a later time.    Video visits are billed at different rates depending on your insurance coverage.  Please reach out to your insurance provider with any questions.    If during the course of the call the physician/provider feels a video visit is not appropriate, you will not be charged for this service.\"    Patient has given verbal consent for Video visit? Yes  How would you like to obtain your AVS? MyChart  If you are dropped from the video visit, the video invite should be resent to: Text to cell phone: [phone]  Will anyone else be joining your video visit? No    Subjective     Francisca Reed is a 35 year old female who presents today via video (done over the phone due to connection issues) visit for the following health issues:    HPI     Concern - Medication request    Description: Pt has dental work 10/30/2020 and is requesting medication for anxiety/panic attacks. Pt has taken valium in the past.  She reports that she will hold her suboxone for 1-3 days without difficulty if she is needing to take medication for anxiety/ a procedure and she has no trouble with this.  Discussed that for this dental procedure, I am ok with her holding her suboxone dose that day so that she can take valium for extreme anxiety; but I do not prescribe suboxone, so I am not really comfortable prescribing this ongoing.  Advised referral to " addiction medicine.  Pt reports she tried to get in with a psychiatrist, but it was 3+ months out.  Pt reports last dose at dentist, medication helped but she still felt anxious.  Discussed that she will need a .  Pt reports a lot of anxiety going on at this time.  Her  filed for divorce, he took their kids, shes been in treatment.       Phone visit Start Time: 11:43 AM    Review of Systems   Constitutional, HEENT, cardiovascular, pulmonary, GI, , musculoskeletal, neuro, skin, endocrine and psych systems are negative, except as otherwise noted.      Objective           Vitals:  No vitals were obtained today due to phone visit.    Physical Exam     GENERAL: Healthy, alert and no distress  RESP: No audible wheeze, cough, or visible cyanosis.    NEURO: Cranial nerves grossly intact.  Mentation and speech appropriate for age.  PSYCH: Mentation appears normal, affect normal/bright, judgement and insight intact, normal speech and appearance well-groomed.      See orders        Assessment & Plan     Francisca was seen today for recheck medication.    Diagnoses and all orders for this visit:    Situational anxiety  -     diazepam (VALIUM) 2 MG tablet; Take 1-3 tablets (2-6 mg) by mouth once for 1 dose Prior to dental procedure; need to have a . Pt will hold suboxone on the days she needs to take valium. She has tolerated this well in the past  -     MENTAL HEALTH REFERRAL  - Adult; Addiction Medicine Provider; Addiction Medicine Evaluation & Treatment; Addiction Medicine Consultation, Evaluation & Treatment [phone]; Opioids; Other: Enter in Comments    Encounter for monitoring Suboxone maintenance therapy  -     MENTAL HEALTH REFERRAL  - Adult; Addiction Medicine Provider; Addiction Medicine Evaluation & Treatment; Addiction Medicine Consultation, Evaluation & Treatment [phone]; Opioids; Other: Enter in Comments    Compression fracture of lumbar vertebra, unspecified lumbar vertebral level,  sequela    Opioid use disorder (H)            See Patient Instructions: advised pt to take medication as prescribed. Hold suboxone if taking valium for dental procedure.  Schedule with psychiatry and addiction medicine.  Follow up as needed.     Return in about 3 months (around 1/29/2021), or if symptoms worsen or fail to improve.    NISREEN Mckenna  [facility] [place]      Phone-Visit Details    Type of service:  Phone Visit    Length of phone call: 14 min    Originating Location (pt. Location): Home    Distant Location (provider location):  [facility] [place]     Platform used for Phone Visit: [employer] Pt could not get video working at the time of visit.

## 2020-10-29 ENCOUNTER — VIRTUAL VISIT (OUTPATIENT)
Dept: FAMILY MEDICINE | Facility: CLINIC | Age: 35
End: 2020-10-29
Payer: COMMERCIAL

## 2020-10-29 ENCOUNTER — TELEPHONE (OUTPATIENT)
Dept: ADDICTION MEDICINE | Facility: CLINIC | Age: 35
End: 2020-10-29

## 2020-10-29 DIAGNOSIS — Z51.81 ENCOUNTER FOR MONITORING SUBOXONE MAINTENANCE THERAPY: ICD-10-CM

## 2020-10-29 DIAGNOSIS — F11.90 OPIOID USE DISORDER: ICD-10-CM

## 2020-10-29 DIAGNOSIS — F41.8 SITUATIONAL ANXIETY: Primary | ICD-10-CM

## 2020-10-29 DIAGNOSIS — Z79.899 ENCOUNTER FOR MONITORING SUBOXONE MAINTENANCE THERAPY: ICD-10-CM

## 2020-10-29 DIAGNOSIS — S32.000S COMPRESSION FRACTURE OF LUMBAR VERTEBRA, UNSPECIFIED LUMBAR VERTEBRAL LEVEL, SEQUELA: ICD-10-CM

## 2020-10-29 PROCEDURE — 99214 OFFICE O/P EST MOD 30 MIN: CPT | Mod: 95 | Performed by: NURSE PRACTITIONER

## 2020-10-29 RX ORDER — DIAZEPAM 2 MG
2-6 TABLET ORAL ONCE
Qty: 3 TABLET | Refills: 0 | Status: SHIPPED | OUTPATIENT
Start: 2020-10-29 | End: 2020-10-29

## 2020-10-29 ASSESSMENT — ASTHMA QUESTIONNAIRES: ACT_TOTALSCORE: 18

## 2020-10-30 DIAGNOSIS — F41.9 ANXIETY: Primary | ICD-10-CM

## 2020-10-30 DIAGNOSIS — Z51.81 ENCOUNTER FOR MONITORING SUBOXONE MAINTENANCE THERAPY: ICD-10-CM

## 2020-10-30 DIAGNOSIS — Z79.899 ENCOUNTER FOR MONITORING SUBOXONE MAINTENANCE THERAPY: ICD-10-CM

## 2020-10-30 NOTE — TELEPHONE ENCOUNTER
Will clarify with referring provider before scheduling.    Phone call to Francisca 10/29, 5:30pm.  Called Francisca to clarify her concerns. Did appear that she had a suboxone provider, Silvino Amado, and wished to work with a provider for ongoing anxiety mgmt.    Valium Rx and instructions for suboxone use aappropriate per chart review.    Routing to Marielle Thompson:  - The patient indicated she will continue with her suboxone provider, Silvino Amado  - She reported needing help with anxiety management, and is open to any/all suggestions; had heard about clonazepam but is open to further suggestions  - Our psychiatry and addiction medicine staff typically does not use clonazepam for chronic anxiety mgmt    Marielle, are we able to refer her to [facility] for consult and initial management of anxiety?    If we are not able to use [facility] services, addiction medicine can see her for acute consultation regarding co-management of anxiety and DMITRY history, but if they are accessing suboxone with another provider we typically do not co-manage these concerns from our addiction medicine clinic. We typically refer to psychiatry for these services; patient reports an appt in about a month but hoping to be seen sooner.     Ang Mercer MD

## 2020-11-03 DIAGNOSIS — K83.09 BACTERIAL CHOLANGITIS (H): ICD-10-CM

## 2020-11-03 DIAGNOSIS — A69.20 LYME DISEASE: ICD-10-CM

## 2020-11-03 DIAGNOSIS — Z79.899 ENCOUNTER FOR LONG-TERM (CURRENT) USE OF OTHER MEDICATIONS: ICD-10-CM

## 2020-11-03 DIAGNOSIS — I51.9 MYXEDEMA HEART DISEASE: ICD-10-CM

## 2020-11-03 DIAGNOSIS — B96.89 BACTERIAL CHOLANGITIS (H): ICD-10-CM

## 2020-11-03 DIAGNOSIS — E03.9 MYXEDEMA HEART DISEASE: ICD-10-CM

## 2020-11-03 LAB
ALBUMIN SERPL-MCNC: 3.8 G/DL (ref 3.4–5)
ALP SERPL-CCNC: 83 U/L (ref 40–150)
ALT SERPL W P-5'-P-CCNC: 19 U/L (ref 0–50)
AST SERPL W P-5'-P-CCNC: 14 U/L (ref 0–45)
BASOPHILS # BLD AUTO: 0 10E9/L (ref 0–0.2)
BASOPHILS NFR BLD AUTO: 0.1 %
BILIRUB DIRECT SERPL-MCNC: <0.1 MG/DL (ref 0–0.2)
BILIRUB SERPL-MCNC: 0.3 MG/DL (ref 0.2–1.3)
BUN SERPL-MCNC: 15 MG/DL (ref 7–30)
CREAT SERPL-MCNC: 0.85 MG/DL (ref 0.52–1.04)
DIFFERENTIAL METHOD BLD: NORMAL
EOSINOPHIL # BLD AUTO: 0.6 10E9/L (ref 0–0.7)
EOSINOPHIL NFR BLD AUTO: 6.1 %
ERYTHROCYTE [DISTWIDTH] IN BLOOD BY AUTOMATED COUNT: 12.5 % (ref 10–15)
GFR SERPL CREATININE-BSD FRML MDRD: 90 ML/MIN/{1.73_M2}
HCT VFR BLD AUTO: 42.2 % (ref 35–47)
HGB BLD-MCNC: 13.9 G/DL (ref 11.7–15.7)
LYMPHOCYTES # BLD AUTO: 2.1 10E9/L (ref 0.8–5.3)
LYMPHOCYTES NFR BLD AUTO: 22.6 %
MCH RBC QN AUTO: 33 PG (ref 26.5–33)
MCHC RBC AUTO-ENTMCNC: 32.9 G/DL (ref 31.5–36.5)
MCV RBC AUTO: 100 FL (ref 78–100)
MONOCYTES # BLD AUTO: 0.5 10E9/L (ref 0–1.3)
MONOCYTES NFR BLD AUTO: 5 %
NEUTROPHILS # BLD AUTO: 6 10E9/L (ref 1.6–8.3)
NEUTROPHILS NFR BLD AUTO: 66.2 %
PLATELET # BLD AUTO: 222 10E9/L (ref 150–450)
PROT SERPL-MCNC: 6.5 G/DL (ref 6.8–8.8)
RBC # BLD AUTO: 4.21 10E12/L (ref 3.8–5.2)
T3FREE SERPL-MCNC: 3.1 PG/ML (ref 2.3–4.2)
T4 FREE SERPL-MCNC: 0.85 NG/DL (ref 0.76–1.46)
T4 SERPL-MCNC: 8.1 UG/DL (ref 4.5–13.9)
TSH SERPL DL<=0.005 MIU/L-ACNC: 0.34 MU/L (ref 0.4–4)
WBC # BLD AUTO: 9.1 10E9/L (ref 4–11)

## 2020-11-03 PROCEDURE — 82565 ASSAY OF CREATININE: CPT | Performed by: FAMILY MEDICINE

## 2020-11-03 PROCEDURE — 84443 ASSAY THYROID STIM HORMONE: CPT | Performed by: FAMILY MEDICINE

## 2020-11-03 PROCEDURE — 86800 THYROGLOBULIN ANTIBODY: CPT | Mod: 90 | Performed by: FAMILY MEDICINE

## 2020-11-03 PROCEDURE — 36415 COLL VENOUS BLD VENIPUNCTURE: CPT | Performed by: FAMILY MEDICINE

## 2020-11-03 PROCEDURE — 84439 ASSAY OF FREE THYROXINE: CPT | Performed by: FAMILY MEDICINE

## 2020-11-03 PROCEDURE — 84432 ASSAY OF THYROGLOBULIN: CPT | Mod: 90 | Performed by: FAMILY MEDICINE

## 2020-11-03 PROCEDURE — 80076 HEPATIC FUNCTION PANEL: CPT | Performed by: FAMILY MEDICINE

## 2020-11-03 PROCEDURE — 85025 COMPLETE CBC W/AUTO DIFF WBC: CPT | Performed by: FAMILY MEDICINE

## 2020-11-03 PROCEDURE — 84520 ASSAY OF UREA NITROGEN: CPT | Performed by: FAMILY MEDICINE

## 2020-11-03 PROCEDURE — 84481 FREE ASSAY (FT-3): CPT | Mod: 59 | Performed by: FAMILY MEDICINE

## 2020-11-03 PROCEDURE — 99000 SPECIMEN HANDLING OFFICE-LAB: CPT | Performed by: FAMILY MEDICINE

## 2020-11-03 PROCEDURE — 86376 MICROSOMAL ANTIBODY EACH: CPT | Performed by: FAMILY MEDICINE

## 2020-11-04 ENCOUNTER — VIRTUAL VISIT (OUTPATIENT)
Dept: ADDICTION MEDICINE | Facility: CLINIC | Age: 35
End: 2020-11-04
Payer: COMMERCIAL

## 2020-11-04 DIAGNOSIS — Z53.9 NO SHOW: Primary | ICD-10-CM

## 2020-11-04 LAB
T3 SERPL-MCNC: 136 NG/DL (ref 60–181)
THYROPEROXIDASE AB SERPL-ACNC: <10 IU/ML

## 2020-11-06 LAB — T3REVERSE SERPL-MCNC: 9.5 NG/DL (ref 9–27)

## 2020-11-07 DIAGNOSIS — J45.20 MILD INTERMITTENT ASTHMA WITHOUT COMPLICATION: ICD-10-CM

## 2020-11-10 LAB — LAB SCANNED RESULT: NORMAL

## 2020-11-10 RX ORDER — ALBUTEROL SULFATE 90 UG/1
AEROSOL, METERED RESPIRATORY (INHALATION)
Qty: 18 G | Refills: 0 | Status: SHIPPED | OUTPATIENT
Start: 2020-11-10 | End: 2020-12-11

## 2020-11-10 NOTE — TELEPHONE ENCOUNTER
Medication is being filled for 1 time refill only due to:  Patient needs to be seen because needs follow up and ACT.. Marco Dwyer RN

## 2020-11-17 ENCOUNTER — OFFICE VISIT (OUTPATIENT)
Dept: OBGYN | Facility: CLINIC | Age: 35
End: 2020-11-17
Payer: COMMERCIAL

## 2020-11-17 VITALS
WEIGHT: 125.9 LBS | DIASTOLIC BLOOD PRESSURE: 70 MMHG | BODY MASS INDEX: 20.32 KG/M2 | TEMPERATURE: 98.6 F | HEART RATE: 119 BPM | SYSTOLIC BLOOD PRESSURE: 110 MMHG

## 2020-11-17 DIAGNOSIS — F11.90 OPIOID USE DISORDER: Primary | ICD-10-CM

## 2020-11-17 DIAGNOSIS — M54.9 CHRONIC BACK PAIN, UNSPECIFIED BACK LOCATION, UNSPECIFIED BACK PAIN LATERALITY: ICD-10-CM

## 2020-11-17 DIAGNOSIS — S32.000S COMPRESSION FRACTURE OF LUMBAR VERTEBRA, UNSPECIFIED LUMBAR VERTEBRAL LEVEL, SEQUELA: ICD-10-CM

## 2020-11-17 DIAGNOSIS — G89.29 CHRONIC BACK PAIN, UNSPECIFIED BACK LOCATION, UNSPECIFIED BACK PAIN LATERALITY: ICD-10-CM

## 2020-11-17 PROCEDURE — 99000 SPECIMEN HANDLING OFFICE-LAB: CPT | Performed by: OBSTETRICS & GYNECOLOGY

## 2020-11-17 PROCEDURE — 99214 OFFICE O/P EST MOD 30 MIN: CPT | Performed by: OBSTETRICS & GYNECOLOGY

## 2020-11-17 PROCEDURE — 80307 DRUG TEST PRSMV CHEM ANLYZR: CPT | Mod: 90 | Performed by: OBSTETRICS & GYNECOLOGY

## 2020-11-17 RX ORDER — BUPRENORPHINE HYDROCHLORIDE AND NALOXONE HYDROCHLORIDE DIHYDRATE 8; 2 MG/1; MG/1
1 TABLET SUBLINGUAL DAILY
Qty: 30 TABLET | Refills: 0 | Status: SHIPPED | OUTPATIENT
Start: 2020-11-17 | End: 2021-01-13

## 2020-11-17 NOTE — PROGRESS NOTES
Clinic Note    CC:    Chief Complaint   Patient presents with     Follow Up     Suboxone      HPI:  Francisca Reed is a 35 year old  being see for f/u for OUD    She is now s/p court, did not go to long term, rather has probation and required treatment. Addiction treatment currently 3 days per week, though reports that she can people she can contact at any time if struggling. There are group sessions as well that she has attended, though she is afraid to open up to others in the group.     Reports no recent use of opioids. She has been self titrating her suboxone, using 4 or 8mg most days, skipping some days, though will begin withdrawing after 2-3 days. She would ultimately like to ween off of the suboxone completely. She reports no other opioid use any time recently, though she acknowledges high risk for relapse if she goes off of the suboxone. She does report still struggling with meth, last use was last weekend.     She has had interruptions in contact with her children, also unstable housing. She is currently staying in a building owned by her parents, but the property is up for sale so she may loose the housing. Also states she has a job lined up but hasn't started yet with all the other stress.      Weight is improved, BMI 20, she is eating and feels healthier.      Her last drug screen showed meth ().       Strong family hx mood disorders, with mother and brother with severe bipolar disorder, and another brother with MDD. Now with recent suicide of one brother.       History of substance abuse: Using opioids (pills) for 10 years, first began after a back surgery, has used on an off, mostly gotten illicitly. She does have a depression hx, also relatively severe postpartum depression. She describes a cyclic pattern with intermittent weeks of insomnia, other weeks of extreme isolation and depression.  She has now had a mental health evaluation, diagnosed with PTSD and anxiety. She has been using  methamphetamines on and off since January. Pending separation/divorce and disputed custody of children.     She is using an IUD for contraception.     PMH:   Past Medical History:   Diagnosis Date     Anxiety      C section 2008     Papanicolaou smear of cervix with low grade squamous intraepithelial lesion (LGSIL) 2008 Pap: LSIL (see Care Everywhere)     PTSD (post-traumatic stress disorder)      SurgHx:   Past Surgical History:   Procedure Laterality Date     BACK SURGERY      fusion left side lower back     BREAST SURGERY  2010     broken bone      arm     C BREAST AUGMENTATION        SECTION        SECTION  2017    x2     GYN SURGERY       INJECT JOINT SACROILIAC       FamHx:   Family History   Problem Relation Age of Onset     Diabetes Father      Bipolar Disorder Mother      Bipolar Disorder Brother      Depression Brother      Suicide Brother      SocHx:   Social History     Socioeconomic History     Marital status:      Spouse name: None     Number of children: 5     Years of education: some college     Highest education level: Some college, no degree   Occupational History     None   Social Needs     Financial resource strain: Very hard     Food insecurity     Worry: Never true     Inability: Never true     Transportation needs     Medical: No     Non-medical: No   Tobacco Use     Smoking status: Current Every Day Smoker     Packs/day: 0.50     Types: Cigarettes     Smokeless tobacco: Never Used   Substance and Sexual Activity     Alcohol use: Not Currently     Comment: rare     Drug use: Not Currently     Types: Oxycodone, Amphetamines     Comment: recovering, last use 2.5 weeks befire 7/10/2020     Sexual activity: Yes     Partners: Male     Birth control/protection: I.U.D.     Comment: mirena - placed 2 years ago as of    Lifestyle     Physical activity     Days per week: 1 day     Minutes per session: 30 min     Stress: Very much    Relationships     Social connections     Talks on phone: More than three times a week     Gets together: More than three times a week     Attends Hoahaoism service: More than 4 times per year     Active member of club or organization: No     Attends meetings of clubs or organizations: Never     Relationship status:      Intimate partner violence     Fear of current or ex partner: No     Emotionally abused: No     Physically abused: No     Forced sexual activity: No   Other Topics Concern     Parent/sibling w/ CABG, MI or angioplasty before 65F 55M? Not Asked   Social History Narrative    ** Merged History Encounter **          Allergies:   Amoxicillin  Current Medications:   Current Outpatient Medications   Medication Sig Dispense Refill     acetaminophen (TYLENOL) 650 MG CR tablet Take 1 tablet by mouth every 8 hours as needed for pain. 100 tablet 11     albuterol (PROAIR HFA/PROVENTIL HFA/VENTOLIN HFA) 108 (90 Base) MCG/ACT inhaler INHALE 2 PUFFS BY MOUTH EVERY 4 HOURS AS NEEDED FOR SHORTNESS OF BREATH 18 g 0     baclofen (LIORESAL) 20 MG tablet Take 1 tablet (20 mg) by mouth 3 times daily Due for follow up visit. 90 tablet 1     buprenorphine-naloxone (SUBOXONE) 8-2 MG SUBL sublingual tablet Place 1 tablet under the tongue daily 12mg q24hr dose, will take 1.5 tabs 45 tablet 0     Calcium-Magnesium 100-50 MG TABS Take 1 tablet by mouth 3 times daily Unsure strengths       cefuroxime (CEFTIN) 500 MG tablet TK 1 T PO BID WITH FOOD       Cyanocobalamin (VITAMIN B-12 PO) Take by mouth daily        cyclobenzaprine (FLEXERIL) 10 MG tablet Take 1 tablet (10 mg) by mouth nightly as needed for muscle spasms 30 tablet 1     fluticasone (FLOVENT HFA) 110 MCG/ACT inhaler Inhale 1 puff into the lungs 2 times daily 12 g 11     gabapentin (NEURONTIN) 300 MG capsule Take 3 capsules (900 mg) by mouth 3 times daily 270 capsule 1     hydrOXYzine (VISTARIL) 50 MG capsule Take 1 capsule (50 mg) by mouth 3 times daily as  needed for itching 90 capsule 1     ibuprofen (ADVIL/MOTRIN) 800 MG tablet Take 1 tablet (800 mg) by mouth every 8 hours as needed for moderate pain 60 tablet 1     levonorgestrel (MIRENA) 20 MCG/24HR IUD 1 each by Intrauterine route once       loratadine (WAL-ITIN) 10 MG tablet Take 10 mg by mouth daily       MAGNESIUM PO Take 200 mg by mouth daily        minocycline (MINOCIN) 100 MG capsule TK 1 C PO BID WITH FOOD. AVOID CALCIUM/MAGNESIUM/DAIRY WITHIN 2 HOURS OF TAKING. AVOID SUN       naloxone (NARCAN) 4 MG/0.1ML nasal spray Spray 1 spray (4 mg) into one nostril alternating nostrils once as needed for opioid reversal every 2-3 minutes until assistance arrives 0.2 mL 0     ondansetron (ZOFRAN) 4 MG tablet TAKE 1 TO 2 TABLETS(4 TO 8 MG) BY MOUTH EVERY 8 HOURS AS NEEDED FOR NAUSEA 20 tablet 1     Probiotic Product (PROBIOTIC DAILY PO) Take by mouth daily        rifampin (RIFADIN) 300 MG capsule TK 1 C PO BID WITH FOOD       UNABLE TO FIND daily MEDICATION NAME: Serrapeptace        VITAMIN D PO Take 1 tablet by mouth daily        escitalopram (LEXAPRO) 5 MG tablet 10 mg daily        lidocaine (LIDODERM) 5 % patch Place 1 patch onto the skin every 24 hours To prevent lidocaine toxicity, patient should be patch free for 12 hrs daily. (Patient not taking: Reported on 2020) 30 patch 1     ROS: 10 point ROS negative other than as noted in the HPI    EXAM:  Vitals:    20 1535   BP: 110/70   BP Location: Right arm   Patient Position: Chair   Cuff Size: Adult Regular   Pulse: 119   Temp: 98.6  F (37  C)   TempSrc: Temporal   Weight: 57.1 kg (125 lb 14.4 oz)    Body mass index is 20.32 kg/m .    Gen: Alert, oriented, appropriately interactive, NAD  Remainder deferred     Labs & Imaging:  GC/Chlam-, HIV-, Hep C-, Hep B-, Trep-, Hgb & TSH wnl (20)    Lab Results   Component Value Date    PAP NIL 2019       ASSESSMENT/PLAN: Francisca Reed is a 35 year old  with addiction, OUD, seen for suboxone  follow up, history as above.     Opioid use disorder (H)  - Discuss weening from suboxone, decision is hers though I am not recommending it at this time, she is at very high risk for relapse, overdose. We did reduce her dose to 8mg, could possibly reduce further to 4mg, though would not go further until her life is more stable. I would like to see her in stable housing and employment, and with clean drug screenings. Encouraged to continue with treatment, work on sobriety.  - Drug  Screen Comprehensive, Urine w/o Reported Meds (Pain Care Package)  - buprenorphine-naloxone (SUBOXONE) 8-2 MG SUBL sublingual tablet; Place 1 tablet under the tongue daily  Dispense: 30 tablet; Refill: 0    Silvino Amado MD   11/17/2020 3:38 PM

## 2020-11-18 RX ORDER — IBUPROFEN 800 MG/1
TABLET, FILM COATED ORAL
Qty: 60 TABLET | Refills: 1 | Status: SHIPPED | OUTPATIENT
Start: 2020-11-18 | End: 2021-04-20

## 2020-11-18 RX ORDER — BACLOFEN 20 MG/1
TABLET ORAL
Qty: 90 TABLET | Refills: 1 | Status: SHIPPED | OUTPATIENT
Start: 2020-11-18 | End: 2021-04-21

## 2020-11-18 RX ORDER — BACLOFEN 20 MG/1
TABLET ORAL
Qty: 90 TABLET | Refills: 1 | Status: SHIPPED | OUTPATIENT
Start: 2020-11-18 | End: 2020-11-18

## 2020-11-18 NOTE — TELEPHONE ENCOUNTER
Refill request received within 30 days of last office visit with pcp.  Prescription is routed to the provider to please address refill.    Mary AWANN, RN

## 2020-11-20 ENCOUNTER — PATIENT OUTREACH (OUTPATIENT)
Dept: CARE COORDINATION | Facility: CLINIC | Age: 35
End: 2020-11-20

## 2020-11-20 NOTE — LETTER
[place] CARE COORDINATION - [facility]  [address] [address]  [place] [id number]  [phone]    November 20, 2020    Francisca Reed  [address] [address] [address]      Dear Francisca,    I have been unsuccessful in reaching you since our last contact. At this time the Care Coordination team will make no further attempts to reach you, however this does not change your ability to continue receiving care from your providers at your primary care clinic. If you need additional support from a care coordinator in the future please contact me at [phone].    All of us at [facility] are invested in your health and are here to assist you in meeting your goals.     Sincerely,    ELENA Reyna  [facility] - Care Coordination  [facility]   [phone]

## 2020-11-20 NOTE — PROGRESS NOTES
Clinic Care Coordination Contact  [facility] [place]/Voicemail       Clinical Data: Care Coordinator Outreach  Outreach attempted x 3.  Left message on patient's voicemail with call back information and requested return call.  Plan: Care Coordinator will send disenrollment letter with care coordinator contact information via [employer]. Care Coordinator will do no further outreaches at this time.    ELENA Reyna, [facility] - Care Coordinator   [facility]  11/20/2020   9:25 AM  [phone]

## 2020-11-21 LAB — COMPREHEN DRUG ANALYSIS UR: NORMAL

## 2020-12-09 DIAGNOSIS — J45.20 MILD INTERMITTENT ASTHMA WITHOUT COMPLICATION: ICD-10-CM

## 2020-12-09 DIAGNOSIS — R11.0 NAUSEA: ICD-10-CM

## 2020-12-10 DIAGNOSIS — R11.0 NAUSEA: ICD-10-CM

## 2020-12-11 DIAGNOSIS — R11.0 NAUSEA: ICD-10-CM

## 2020-12-11 RX ORDER — ONDANSETRON 4 MG/1
TABLET, FILM COATED ORAL
Qty: 20 TABLET | Refills: 1 | Status: SHIPPED | OUTPATIENT
Start: 2020-12-11 | End: 2022-06-14

## 2020-12-11 RX ORDER — ALBUTEROL SULFATE 90 UG/1
1-2 AEROSOL, METERED RESPIRATORY (INHALATION) EVERY 4 HOURS PRN
Qty: 18 G | Refills: 0 | Status: SHIPPED | OUTPATIENT
Start: 2020-12-11 | End: 2021-01-22

## 2020-12-14 ENCOUNTER — HEALTH MAINTENANCE LETTER (OUTPATIENT)
Age: 35
End: 2020-12-14

## 2020-12-15 RX ORDER — ONDANSETRON 4 MG/1
TABLET, FILM COATED ORAL
Qty: 20 TABLET | Refills: 1 | OUTPATIENT
Start: 2020-12-15

## 2020-12-15 RX ORDER — ONDANSETRON 4 MG/1
TABLET, FILM COATED ORAL
Qty: 20 TABLET | Refills: 1 | Status: SHIPPED | OUTPATIENT
Start: 2020-12-15 | End: 2021-05-11

## 2020-12-31 ENCOUNTER — TELEPHONE (OUTPATIENT)
Dept: FAMILY MEDICINE | Facility: CLINIC | Age: 35
End: 2020-12-31

## 2020-12-31 NOTE — TELEPHONE ENCOUNTER
Reason for Call:  Other Letter    Detailed comments:     patient states she has court on Monday, 1/4 and is requesting a letter from PCP stating the symptoms patient has been experiencing with her Lymes disease: alopecia, intense fatigue, muscle stiffness, joint pain, brain fog, limb paralysis, palsies, and skin lesions.    Patient states she has tried to get in touch with her lymes specialist regarding this, but they have moved clinics.    Please send letter to paint via [employer] once completed    Phone Number Patient can be reached at: Home number on file [phone] (home)    Best Time: anytime    Can we leave a detailed message on this number? YES    Call taken on 12/31/2020 at 4:17 PM by Del Estevez

## 2021-01-04 NOTE — TELEPHONE ENCOUNTER
I do not have any documentation about lymes disease.  Her last test was negative. WILDER Peña, FNP-BC

## 2021-01-04 NOTE — TELEPHONE ENCOUNTER
Please notify patient, this will have to wait for Marielle when she returns to clinic.      Thea Ndiaye PA-C

## 2021-01-04 NOTE — TELEPHONE ENCOUNTER
Patient still would like a letter drafted to state her Dx of Lymes disease and the problems that can go with it (outlined below) will call when done.

## 2021-01-13 ENCOUNTER — OFFICE VISIT (OUTPATIENT)
Dept: OBGYN | Facility: CLINIC | Age: 36
End: 2021-01-13
Payer: COMMERCIAL

## 2021-01-13 VITALS
DIASTOLIC BLOOD PRESSURE: 70 MMHG | WEIGHT: 129 LBS | HEART RATE: 105 BPM | TEMPERATURE: 98.6 F | SYSTOLIC BLOOD PRESSURE: 120 MMHG | OXYGEN SATURATION: 98 % | BODY MASS INDEX: 20.82 KG/M2

## 2021-01-13 DIAGNOSIS — F11.90 OPIOID USE DISORDER: Primary | ICD-10-CM

## 2021-01-13 PROCEDURE — 99000 SPECIMEN HANDLING OFFICE-LAB: CPT | Performed by: OBSTETRICS & GYNECOLOGY

## 2021-01-13 PROCEDURE — 80307 DRUG TEST PRSMV CHEM ANLYZR: CPT | Mod: 90 | Performed by: OBSTETRICS & GYNECOLOGY

## 2021-01-13 PROCEDURE — 99213 OFFICE O/P EST LOW 20 MIN: CPT | Performed by: OBSTETRICS & GYNECOLOGY

## 2021-01-13 RX ORDER — BUPRENORPHINE HYDROCHLORIDE AND NALOXONE HYDROCHLORIDE DIHYDRATE 8; 2 MG/1; MG/1
1 TABLET SUBLINGUAL DAILY
Qty: 30 TABLET | Refills: 0 | Status: SHIPPED | OUTPATIENT
Start: 2021-01-13 | End: 2021-10-28

## 2021-01-13 NOTE — PROGRESS NOTES
Clinic Note    CC:    Chief Complaint   Patient presents with     Follow Up     Suboxone      HPI:  Francisca Reed is a 35 year old  being see for f/u for OUD, methamphetamine abuse.    Recent drug use/relapse: meth. Struggles with loneliness, depression, lack of motivation.     She is now out of suboxone, was using half tabs, currently in withdrawal, thinks that this lead to relapse.     In outpatient addiction treatment three days per week, therapy weekly for mental health.  Just got an apartment, sees kids twice a week.   Several recent court appearances for custody, divorce, and for substance use. Not currently looking at incarceration.     Weight is recently up and stable at BMI 20, she is eating and feels healthier.      Her last drug screen showed meth, benzos, cocaine (11/17). This was reviewed today. She admits to the meth use, denies the others, thinks possible contamination in the meth she is using.        Strong family hx mood disorders, with mother and brother with severe bipolar disorder, and another brother with MDD. Now with recent suicide of one brother.       History of substance abuse: Using opioids (pills) for 10 years, first began after a back surgery, has used on an off, mostly gotten illicitly. She does have a depression hx, also relatively severe postpartum depression. She describes a cyclic pattern with intermittent weeks of insomnia, other weeks of extreme isolation and depression.  She has now had a mental health evaluation, diagnosed with PTSD and anxiety. She has been using methamphetamines on and off for the past year.  Pending separation/divorce and disputed custody of children.     She is using an IUD for contraception.     PMH:   Past Medical History:   Diagnosis Date     Anxiety      C section 04/2008     Papanicolaou smear of cervix with low grade squamous intraepithelial lesion (LGSIL) 06/13/2008 6/13/2008 Pap: LSIL (see Care Everywhere)     PTSD (post-traumatic stress  disorder)      SurgHx:   Past Surgical History:   Procedure Laterality Date     BACK SURGERY  2011    fusion left side lower back     BREAST SURGERY  2010     broken bone  1996    arm     C BREAST AUGMENTATION  2010      SECTION  2008      SECTION  2017    x2     GYN SURGERY       INJECT JOINT SACROILIAC       FamHx:   Family History   Problem Relation Age of Onset     Diabetes Father      Bipolar Disorder Mother      Bipolar Disorder Brother      Depression Brother      Suicide Brother      SocHx:   Social History     Socioeconomic History     Marital status:      Spouse name: None     Number of children: 5     Years of education: some college     Highest education level: Some college, no degree   Occupational History     None   Social Needs     Financial resource strain: Very hard     Food insecurity     Worry: Never true     Inability: Never true     Transportation needs     Medical: No     Non-medical: No   Tobacco Use     Smoking status: Current Every Day Smoker     Packs/day: 0.50     Types: Cigarettes     Smokeless tobacco: Never Used   Substance and Sexual Activity     Alcohol use: Not Currently     Comment: rare     Drug use: Not Currently     Types: Oxycodone, Amphetamines     Comment: recovering, last use 2.5 weeks befire 7/10/2020     Sexual activity: Yes     Partners: Male     Birth control/protection: I.U.D.     Comment: mirena - placed 2 years ago as of    Lifestyle     Physical activity     Days per week: 1 day     Minutes per session: 30 min     Stress: Very much   Relationships     Social connections     Talks on phone: More than three times a week     Gets together: More than three times a week     Attends Hinduism service: More than 4 times per year     Active member of club or organization: No     Attends meetings of clubs or organizations: Never     Relationship status:      Intimate partner violence     Fear of current or ex partner: No      Emotionally abused: No     Physically abused: No     Forced sexual activity: No   Other Topics Concern     Parent/sibling w/ CABG, MI or angioplasty before 65F 55M? Not Asked   Social History Narrative    ** Merged History Encounter **          Allergies:   Amoxicillin  Current Medications:   Current Outpatient Medications   Medication Sig Dispense Refill     acetaminophen (TYLENOL) 650 MG CR tablet Take 1 tablet by mouth every 8 hours as needed for pain. 100 tablet 11     albuterol (PROAIR HFA/PROVENTIL HFA/VENTOLIN HFA) 108 (90 Base) MCG/ACT inhaler Inhale 1-2 puffs into the lungs every 4 hours as needed for shortness of breath / dyspnea or wheezing 18 g 0     baclofen (LIORESAL) 20 MG tablet TAKE 1 TABLET(20 MG) BY MOUTH THREE TIMES DAILY. FOLLOW UP VISIT 90 tablet 1     buprenorphine-naloxone (SUBOXONE) 8-2 MG SUBL sublingual tablet Place 1 tablet under the tongue daily 30 tablet 0     Calcium-Magnesium 100-50 MG TABS Take 1 tablet by mouth 3 times daily Unsure strengths       cefuroxime (CEFTIN) 500 MG tablet TK 1 T PO BID WITH FOOD       Cyanocobalamin (VITAMIN B-12 PO) Take by mouth daily        fluticasone (FLOVENT HFA) 110 MCG/ACT inhaler Inhale 1 puff into the lungs 2 times daily 12 g 11     gabapentin (NEURONTIN) 300 MG capsule Take 3 capsules (900 mg) by mouth 3 times daily 270 capsule 1     ibuprofen (ADVIL/MOTRIN) 800 MG tablet TAKE 1 TABLET(800 MG) BY MOUTH EVERY 8 HOURS AS NEEDED FOR MODERATE PAIN 60 tablet 1     levonorgestrel (MIRENA) 20 MCG/24HR IUD 1 each by Intrauterine route once       loratadine (WAL-ITIN) 10 MG tablet Take 10 mg by mouth daily       minocycline (MINOCIN) 100 MG capsule TK 1 C PO BID WITH FOOD. AVOID CALCIUM/MAGNESIUM/DAIRY WITHIN 2 HOURS OF TAKING. AVOID SUN       naloxone (NARCAN) 4 MG/0.1ML nasal spray Spray 1 spray (4 mg) into one nostril alternating nostrils once as needed for opioid reversal every 2-3 minutes until assistance arrives 0.2 mL 0     ondansetron (ZOFRAN) 4  MG tablet TAKE 1 TO 2 TABLETS(4 TO 8 MG) BY MOUTH EVERY 8 HOURS AS NEEDED FOR NAUSEA 20 tablet 1     ondansetron (ZOFRAN) 4 MG tablet TAKE 1 TO 2 TABLETS(4 TO 8 MG) BY MOUTH EVERY 8 HOURS AS NEEDED FOR NAUSEA 20 tablet 1     Probiotic Product (PROBIOTIC DAILY PO) Take by mouth daily        rifampin (RIFADIN) 300 MG capsule TK 1 C PO BID WITH FOOD       UNABLE TO FIND daily MEDICATION NAME: Serrapeptace        VITAMIN D PO Take 1 tablet by mouth daily        cyclobenzaprine (FLEXERIL) 10 MG tablet Take 1 tablet (10 mg) by mouth nightly as needed for muscle spasms (Patient not taking: Reported on 2021) 30 tablet 1     escitalopram (LEXAPRO) 5 MG tablet 10 mg daily        hydrOXYzine (VISTARIL) 50 MG capsule Take 1 capsule (50 mg) by mouth 3 times daily as needed for itching (Patient not taking: Reported on 2021) 90 capsule 1     lidocaine (LIDODERM) 5 % patch Place 1 patch onto the skin every 24 hours To prevent lidocaine toxicity, patient should be patch free for 12 hrs daily. (Patient not taking: Reported on 2020) 30 patch 1     MAGNESIUM PO Take 200 mg by mouth daily        ROS: 10 point ROS negative other than as noted in the HPI    EXAM:  Vitals:    21 1547   BP: 120/70   BP Location: Right arm   Patient Position: Chair   Cuff Size: Adult Regular   Pulse: 105   Temp: 98.6  F (37  C)   TempSrc: Temporal   SpO2: 98%   Weight: 58.5 kg (129 lb)    Body mass index is 20.82 kg/m .    Gen: Alert, oriented, appropriately interactive, NAD  Remainder deferred     Labs & Imaging:  GC/Chlam-, HIV-, Hep C-, Hep B-, Trep-, Hgb & TSH wnl (20)    Lab Results   Component Value Date    PAP NIL 2019       ASSESSMENT/PLAN: Francisca Reed is a 35 year old  with addiction, OUD, seen for suboxone follow up, history as above.      Opioid use disorder (H)  - Ongoing addiction counseling today, strongly encouraging her to continue suboxone, return for refills in a timely manner, she is at very high  risk for relapse, overdose. We did reduce her dose to 8mg at last visit, could possibly reduce further to 4mg, though would not go further until her life is more stable. I would like to see her in stable housing and employment, and with clean drug screenings. Encouraged to continue with treatment, work on sobriety.  - Repeat drug screen today, anticipate with show meth, will continue drug screens at every visit until several clear screens   - buprenorphine-naloxone (SUBOXONE) 8-2 MG SUBL sublingual tablet; Place 1 tablet under the tongue daily  Dispense: 30 tablet; Refill: 0  - Drug  Screen Comprehensive, Urine w/o Reported Meds (Pain Care Package)    Silvino Amado MD   1/13/2021 3:52 PM

## 2021-01-18 LAB — COMPREHEN DRUG ANALYSIS UR: NORMAL

## 2021-01-19 DIAGNOSIS — J45.20 MILD INTERMITTENT ASTHMA WITHOUT COMPLICATION: ICD-10-CM

## 2021-01-20 DIAGNOSIS — G89.29 CHRONIC BACK PAIN, UNSPECIFIED BACK LOCATION, UNSPECIFIED BACK PAIN LATERALITY: ICD-10-CM

## 2021-01-20 DIAGNOSIS — M54.9 CHRONIC BACK PAIN, UNSPECIFIED BACK LOCATION, UNSPECIFIED BACK PAIN LATERALITY: ICD-10-CM

## 2021-01-20 DIAGNOSIS — S32.000S COMPRESSION FRACTURE OF LUMBAR VERTEBRA, UNSPECIFIED LUMBAR VERTEBRAL LEVEL, SEQUELA: ICD-10-CM

## 2021-01-20 NOTE — TELEPHONE ENCOUNTER
Requested Prescriptions   Pending Prescriptions Disp Refills     gabapentin (NEURONTIN) 300 MG capsule 270 capsule 1     Sig: Take 3 capsules (900 mg) by mouth 3 times daily       There is no refill protocol information for this order        Last Written Prescription Date:    Last Fill Quantity: ,  # refills:    Last office visit: Visit date not found with prescribing provider:  Mikaela   Future Office Visit:

## 2021-01-21 RX ORDER — GABAPENTIN 300 MG/1
900 CAPSULE ORAL 3 TIMES DAILY
Qty: 270 CAPSULE | Refills: 1 | Status: SHIPPED | OUTPATIENT
Start: 2021-01-21 | End: 2021-04-06

## 2021-01-22 RX ORDER — ALBUTEROL SULFATE 90 UG/1
1-2 AEROSOL, METERED RESPIRATORY (INHALATION) EVERY 4 HOURS PRN
Qty: 18 G | Refills: 1 | Status: SHIPPED | OUTPATIENT
Start: 2021-01-22 | End: 2021-10-28

## 2021-03-05 ENCOUNTER — TELEPHONE (OUTPATIENT)
Dept: FAMILY MEDICINE | Facility: CLINIC | Age: 36
End: 2021-03-05

## 2021-03-05 PROBLEM — J45.20 MILD INTERMITTENT ASTHMA WITHOUT COMPLICATION: Status: ACTIVE | Noted: 2020-07-24

## 2021-03-05 NOTE — TELEPHONE ENCOUNTER
Panel Management Review      Patient has the following on her problem list: asthma, anxiety  Summary:    Patient is due/failing the following:   ACT  GAD7    Type of outreach:    Sent [employer] message.    Questions for provider review:    None                                                                                                                                    Perla Diego MA       Chart routed to Care Team .

## 2021-03-10 DIAGNOSIS — F41.8 SITUATIONAL ANXIETY: ICD-10-CM

## 2021-03-10 ASSESSMENT — ANXIETY QUESTIONNAIRES
3. WORRYING TOO MUCH ABOUT DIFFERENT THINGS: SEVERAL DAYS
6. BECOMING EASILY ANNOYED OR IRRITABLE: SEVERAL DAYS
2. NOT BEING ABLE TO STOP OR CONTROL WORRYING: SEVERAL DAYS
GAD7 TOTAL SCORE: 8
7. FEELING AFRAID AS IF SOMETHING AWFUL MIGHT HAPPEN: SEVERAL DAYS
5. BEING SO RESTLESS THAT IT IS HARD TO SIT STILL: SEVERAL DAYS
1. FEELING NERVOUS, ANXIOUS, OR ON EDGE: MORE THAN HALF THE DAYS
IF YOU CHECKED OFF ANY PROBLEMS ON THIS QUESTIONNAIRE, HOW DIFFICULT HAVE THESE PROBLEMS MADE IT FOR YOU TO DO YOUR WORK, TAKE CARE OF THINGS AT HOME, OR GET ALONG WITH OTHER PEOPLE: SOMEWHAT DIFFICULT

## 2021-03-10 ASSESSMENT — PATIENT HEALTH QUESTIONNAIRE - PHQ9
5. POOR APPETITE OR OVEREATING: SEVERAL DAYS
SUM OF ALL RESPONSES TO PHQ QUESTIONS 1-9: 15

## 2021-03-10 NOTE — TELEPHONE ENCOUNTER
Type of outreach:    completed     Act=20   Phq9= 15   Gad7=8    Patient states she is doing ok and does not feel like she needs to make a change at this time. She will let us know if anything changes. Perla Diego MA

## 2021-03-11 ENCOUNTER — TELEPHONE (OUTPATIENT)
Dept: FAMILY MEDICINE | Facility: OTHER | Age: 36
End: 2021-03-11

## 2021-03-11 RX ORDER — DIAZEPAM 2 MG
TABLET ORAL
Qty: 3 TABLET | OUTPATIENT
Start: 2021-03-11

## 2021-03-11 ASSESSMENT — ANXIETY QUESTIONNAIRES: GAD7 TOTAL SCORE: 8

## 2021-03-11 ASSESSMENT — ASTHMA QUESTIONNAIRES: ACT_TOTALSCORE: 20

## 2021-03-11 NOTE — TELEPHONE ENCOUNTER
Reason for Call:  Other call back and prescription    Detailed comments: Patient has appointment on Monday but is out of herSUBOXONE and would like enough to get her thru until Monday.       Phone Number Patient can be reached at: Home number on file [phone] (home) or Cell number on file:    No relevant phone numbers on file.       Best Time: any    Can we leave a detailed message on this number? YES    Call taken on 3/11/2021 at 3:45 PM by Jovana Drake

## 2021-03-11 NOTE — TELEPHONE ENCOUNTER
Suboxone was last prescribed to pt on 1/13/21 for only a 30 day supply.  Pt should have been out of her suboxone a month ago.    Will route to Dr. Amado to address.    Lilo Sarah RN

## 2021-03-11 NOTE — TELEPHONE ENCOUNTER
Routing refill request to provider for review/approval because:  Drug not on the [facility] refill protocol     Last Written Prescription Date:  10-29-20  Last Fill Quantity: 3,  # refills: 0   Last office visit: 10-29-20 virtual with prescribing provider:  Marielle Thompson   Future Office Visit:        Med no longer active on med list and looks like ordered for dental work.  Attempted to call patient and mailbox is full.  Will send to provider to advise

## 2021-03-12 NOTE — TELEPHONE ENCOUNTER
Silvino Amado MD  You 19 minutes ago (8:40 AM)     Will discuss on Monday, no refill until then.     Attempted to reach pt to let her know that Dr. Amado is not willing to fill her Suboxone today since she has not been seen in 2 months.  He will discuss further at her appt on Monday.  No answer and unable to LVM due to mailbox is full.    Lilo Sarah RN

## 2021-03-15 NOTE — TELEPHONE ENCOUNTER
Pt no-showed her appt with Dr. baumann today at noon.  She can not have any prescriptions until she is seen in office.    Lilo Sarah RN

## 2021-03-30 NOTE — PROGRESS NOTES
Francisac Reed  is a 35 year old year old female who is being evaluated via a billable video visit.      How would you like to obtain your AVS? MyChart  If the video visit is dropped, the invitation should be resent by: Text to cell phone: [phone]  Will anyone else be joining your video visit? No    Rheumatology Video Visit      Francisca Reed MRN# [id number]   YOB: 1985 Age: 35 year old      Date of visit: 3/31/21   PCP: Marielle Thompson    Chief Complaint   Patient presents with:  Consult: Chronic lymes disease    Assessment and Plan     1.  Chronic hip and knee pain: Knee pain is suggestive of a meniscal issue, possibly degenerative changes.  Advise getting an x-ray of her knees and starting physical therapy.  Hip pain may be related to the knee pain and lower back pain.  Her chronic back issues are related to what she describes as a 500 pound warehouse door falling on her resulting in a vertebral compression fracture and requiring her left SI joint to be fused surgically.  Advised physical therapy for her hips as well and will check x-rays.  She is already doing physical therapy exercises for her back as the back issue is a long-term problem she says.  - X-rays: Bilateral knees and hips  - Physical therapy referral    2. Chronic back issues: reportedly related to a 500lb warehouse door falling on her, resulting in a vertebral compression fracture and subsequent left SI joint fusion. Says that she is following with a spine surgeon.     3. Reported Chronic Lyme: reportedly treated with 9 months of rifampin, cefuroxime, minocycline, another antibiotic, and supplements by a private doctor in [place] but the providers name or clinic name is unknown to the patient.  Lyme testing on 7/24/2020 was negative.  Advised that she have the lyme disease records sent to me for review and my fax number was provided. Note that her current knee/hip symptoms are not suggestive of an inflammatory etiology.     4.  "Cigarette Smoking:  Encouraged complete cessation and discussed the health benefits.      # This is a virtual visit to reduce the risk of COVID-19 exposure during this current pandemic.      Total minutes spent in evaluation with patient, documentation, , and review of pertinent studies and chart notes: 49     Ms. Reed verbalized agreement with and understanding of the rational for the diagnosis and treatment plan.  All questions were answered to best of my ability and the patient's satisfaction. Ms. Reed was advised to contact the clinic with any questions that may arise after the clinic visit.      Thank you for involving me in the care of the patient    Return to clinic: 1-2 months      HPI   Francisca Reed is a 35 year old female with a past medical history significant for asthma, traumatic back injury who presents for eval of knee and hip pain.     Today, Ms. Reed arrived 26 min late to her appointment.  She reports that she was dx'd with Chronic Lyme Disease by a private doctor in [place] but the name of the provider and clinic are unknown to the patient; was tx'd with 9 months of 4 different antibiotics and \"a bunch of medications\" and \"supplements\" - including rifampin, cefuroxime and minocycline.  She says that she had blood draws for a while.  Symptoms at this time includes extreme fatigue, brain fog with poor memory, limb paralysis from June-Nov 2020, and joint pain.  No paralysis now and following with neurology for that.  Knees and hips are the affected joints, worse with moving and better with rest.  500lb warehouse door fell on her requiring left SI joint fusion and planning to have the contralateral side fused as well.  Knee pain is worse with any activity, similar with all activity; no swelling, increased warmth, or overlying erythema.  No upper extremity joint issues.  No ankle or foot issues.  Morning stiffness for about 1 hour.      Knees lock/pop/click and give out. No knee " swelling.     Denies fevers, chills, nausea, vomiting, constipation, diarrhea. No abdominal pain. No blood in stool.  No chest pain/pressure, palpitations, or shortness of breath. No LE swelling. No neck pain. No oral or nasal sores.  No rash. No sicca symptoms. No photosensitivity or photophobia. No eye pain or redness. No history of inflammatory eye disease.  No history of DVT, pulmonary embolism, or miscarriage.   No history of serositis.  No history of Raynaud's Phenomenon.  No known renal disorder.  Fatigue is all the time; wakes up tired but much worse 1 hour after waking; tries to not nap during the day; wakes up 4 times per night due to back pain.     Patient denies history of intrauterine fetal death that is listed in her PMHx.     Tobacco: 2 cigarettes  EtOH: none  Drugs: denies  Occupation: not working currently.     ROS   12 point review of system was completed and negative except as noted in the HPI     Active Problem List     Patient Active Problem List   Diagnosis     Intrauterine fetal death     CARDIOVASCULAR SCREENING; LDL GOAL LESS THAN 160     Fracture of hip (H)     Compression fx, lumbar spine (H)     Papanicolaou smear of cervix with low grade squamous intraepithelial lesion (LGSIL)     Excoriation (skin-picking) disorder     Chronic back pain, unspecified back location, unspecified back pain laterality     Tobacco use disorder     Opioid use disorder (H)     Mild intermittent asthma without complication     Past Medical History     Past Medical History:   Diagnosis Date     Anxiety      C section 04/2008     Papanicolaou smear of cervix with low grade squamous intraepithelial lesion (LGSIL) 06/13/2008 6/13/2008 Pap: LSIL (see Care Everywhere)     PTSD (post-traumatic stress disorder)      Past Surgical History     Past Surgical History:   Procedure Laterality Date     BACK SURGERY  2011    fusion left side lower back     BREAST SURGERY  02/2010     broken bone  1996    arm     C BREAST  AUGMENTATION  2010      SECTION  2008      SECTION  2017    x2     GYN SURGERY       INJECT JOINT SACROILIAC       Allergy     Allergies   Allergen Reactions     Amoxicillin      Current Medication List     Current Outpatient Medications   Medication Sig     acetaminophen (TYLENOL) 650 MG CR tablet Take 1 tablet by mouth every 8 hours as needed for pain.     albuterol (PROAIR HFA/PROVENTIL HFA/VENTOLIN HFA) 108 (90 Base) MCG/ACT inhaler Inhale 1-2 puffs into the lungs every 4 hours as needed for shortness of breath / dyspnea or wheezing     baclofen (LIORESAL) 20 MG tablet TAKE 1 TABLET(20 MG) BY MOUTH THREE TIMES DAILY. FOLLOW UP VISIT     buprenorphine-naloxone (SUBOXONE) 8-2 MG SUBL sublingual tablet Place 1 tablet under the tongue daily     Calcium-Magnesium 100-50 MG TABS Take 1 tablet by mouth 3 times daily Unsure strengths     Cyanocobalamin (VITAMIN B-12 PO) Take by mouth daily      escitalopram (LEXAPRO) 5 MG tablet 10 mg daily      fluticasone (FLOVENT HFA) 110 MCG/ACT inhaler Inhale 1 puff into the lungs 2 times daily     gabapentin (NEURONTIN) 300 MG capsule Take 3 capsules (900 mg) by mouth 3 times daily     ibuprofen (ADVIL/MOTRIN) 800 MG tablet TAKE 1 TABLET(800 MG) BY MOUTH EVERY 8 HOURS AS NEEDED FOR MODERATE PAIN     loratadine (WAL-ITIN) 10 MG tablet Take 10 mg by mouth daily     MAGNESIUM PO Take 200 mg by mouth daily      minocycline (MINOCIN) 100 MG capsule TK 1 C PO BID WITH FOOD. AVOID CALCIUM/MAGNESIUM/DAIRY WITHIN 2 HOURS OF TAKING. AVOID SUN     ondansetron (ZOFRAN) 4 MG tablet TAKE 1 TO 2 TABLETS(4 TO 8 MG) BY MOUTH EVERY 8 HOURS AS NEEDED FOR NAUSEA     Probiotic Product (PROBIOTIC DAILY PO) Take by mouth daily      rifampin (RIFADIN) 300 MG capsule TK 1 C PO BID WITH FOOD     UNABLE TO FIND daily MEDICATION NAME: Serrapeptace      VITAMIN D PO Take 1 tablet by mouth daily      cefuroxime (CEFTIN) 500 MG tablet TK 1 T PO BID WITH FOOD     cyclobenzaprine (FLEXERIL)  "10 MG tablet Take 1 tablet (10 mg) by mouth nightly as needed for muscle spasms (Patient not taking: Reported on 1/13/2021)     hydrOXYzine (VISTARIL) 50 MG capsule Take 1 capsule (50 mg) by mouth 3 times daily as needed for itching (Patient not taking: Reported on 1/13/2021)     levonorgestrel (MIRENA) 20 MCG/24HR IUD 1 each by Intrauterine route once     lidocaine (LIDODERM) 5 % patch Place 1 patch onto the skin every 24 hours To prevent lidocaine toxicity, patient should be patch free for 12 hrs daily. (Patient not taking: Reported on 11/17/2020)     naloxone (NARCAN) 4 MG/0.1ML nasal spray Spray 1 spray (4 mg) into one nostril alternating nostrils once as needed for opioid reversal every 2-3 minutes until assistance arrives     ondansetron (ZOFRAN) 4 MG tablet TAKE 1 TO 2 TABLETS(4 TO 8 MG) BY MOUTH EVERY 8 HOURS AS NEEDED FOR NAUSEA     No current facility-administered medications for this visit.          Social History   See HPI    Family History     Family History   Problem Relation Age of Onset     Diabetes Father      Bipolar Disorder Mother      Bipolar Disorder Brother      Depression Brother      Suicide Brother        Physical Exam     Temp Readings from Last 3 Encounters:   01/13/21 98.6  F (37  C) (Temporal)   11/17/20 98.6  F (37  C) (Temporal)   09/18/20 97.9  F (36.6  C) (Tympanic)     BP Readings from Last 5 Encounters:   01/13/21 120/70   11/17/20 110/70   09/18/20 130/81   09/08/20 110/60   07/24/20 109/58     Pulse Readings from Last 1 Encounters:   01/13/21 105     Resp Readings from Last 1 Encounters:   09/18/20 20     Estimated body mass index is 20.82 kg/m  as calculated from the following:    Height as of 7/24/20: 1.676 m (5' 6\").    Weight as of 1/13/21: 58.5 kg (129 lb).    No vitals taken today because this is a virtual visit    GEN: NAD. Healthy appearing adult.   HEENT: MMM.  Anicteric, noninjected sclera. No obvious external lesions of the ear and nose. Hearing intact.  PULM: No " increased work of breathing  MSK:  Hands and wrists without swelling. Elbows without swelling. Shoulders with normal ROM. Knees without swelling; normal joint contours.    SKIN: No rash or jaundice seen  PSYCH: Alert. Appropriate.     Labs / Imaging (select studies)     SARIAH  Recent Labs   Lab Test 07/24/20  0930   CHONG Negative     CBC  Recent Labs   Lab Test 11/03/20  1353 09/18/20  1524 06/11/20  1151   WBC 9.1 5.0 6.2   RBC 4.21 4.17 3.99   HGB 13.9 13.5 13.1   HCT 42.2 41.0 39.4    98 99   RDW 12.5 12.4 12.5    203 186   MCH 33.0 32.4 32.8   MCHC 32.9 32.9 33.2   NEUTROPHIL 66.2 42.5  --    LYMPH 22.6 40.5  --    MONOCYTE 5.0 9.3  --    EOSINOPHIL 6.1 7.3  --    BASOPHIL 0.1 0.4  --    ANEU 6.0 2.1  --    ALYM 2.1 2.0  --    SARA 0.5 0.5  --    AEOS 0.6 0.4  --    ABAS 0.0 0.0  --      CMP  Recent Labs   Lab Test 11/03/20  1353 09/18/20  1524 07/24/20  0930 09/09/19  1325   NA  --   --  140 141   POTASSIUM  --   --  3.8 4.2   CHLORIDE  --   --  106 108   CO2  --   --  31 27   ANIONGAP  --   --  3 6   GLC  --   --  94 99   BUN 15 19 18 7   CR 0.85 0.76 0.65 0.63   GFRESTIMATED 90 >90 >90 >90   GFRESTBLACK >90 >90 >90 >90   LUCINDA  --   --  8.3* 9.4   BILITOTAL 0.3 0.6 0.7 0.3   ALBUMIN 3.8 3.7 4.1 4.2   PROTTOTAL 6.5* 6.6* 7.0 7.5   ALKPHOS 83 74 67 74   AST 14 14 21 23   ALT 19 21 40 36     ESR/CRP  Recent Labs   Lab Test 07/24/20  0930   SED 6   CRP <2.9     CK/Aldolase  Recent Labs   Lab Test 07/24/20 0930   *     TSH/T4  Recent Labs   Lab Test 11/03/20  1353 06/11/20  1151   TSH 0.34* 1.10   T4 0.85  8.1  --      Hepatitis B  Recent Labs   Lab Test 06/11/20  1151 09/09/19  1325   HEPBANG Nonreactive Nonreactive     Hepatitis C  Recent Labs   Lab Test 06/11/20  1151 09/09/19  1325   HCVAB Nonreactive Nonreactive     Lyme ab screening  Recent Labs   Lab Test 07/24/20  0930   LYMEGM 0.58     HIV Screening  Recent Labs   Lab Test 06/11/20  1151 09/09/19  1325   HIAGAB Nonreactive Nonreactive      Immunization History     Immunization History   Administered Date(s) Administered     HPV Quadrivalent 06/13/2008, 08/28/2008, 01/05/2009     Influenza (IIV3) PF 11/23/2007, 11/04/2009, 12/09/2013     TDAP Vaccine (Boostrix) 06/23/2017          Chart documentation done in part with Dragon Voice recognition Software. Although reviewed after completion, some word and grammatical error may remain.      Video-Visit Details    Type of service:  Video Visit    Video Start Time: 9:26 AM    Video End Time: 9:46 AM    Originating Location (pt. Location): [place], [place]    Distant Location (provider location):  Home    Platform used for Video Visit: Tato Wise MD

## 2021-03-30 NOTE — PATIENT INSTRUCTIONS
RHEUMATOLOGY    Dr. Moo Wise    [facility]  [address] [place] [address]    Our new phone number for Rheumatology is [phone], this number will be able to help you schedule appointments for Dr. Wise or if you have any message you would like sent to us.    Thank you for choosing [facility]!    Brenda Cadena [facility] Rheumatology

## 2021-03-31 ENCOUNTER — VIRTUAL VISIT (OUTPATIENT)
Dept: RHEUMATOLOGY | Facility: CLINIC | Age: 36
End: 2021-03-31
Payer: COMMERCIAL

## 2021-03-31 DIAGNOSIS — M25.551 BILATERAL HIP PAIN: ICD-10-CM

## 2021-03-31 DIAGNOSIS — M25.552 BILATERAL HIP PAIN: ICD-10-CM

## 2021-03-31 DIAGNOSIS — M25.561 CHRONIC PAIN OF BOTH KNEES: Primary | ICD-10-CM

## 2021-03-31 DIAGNOSIS — G89.29 CHRONIC PAIN OF BOTH KNEES: Primary | ICD-10-CM

## 2021-03-31 DIAGNOSIS — M25.562 CHRONIC PAIN OF BOTH KNEES: Primary | ICD-10-CM

## 2021-03-31 PROCEDURE — 99204 OFFICE O/P NEW MOD 45 MIN: CPT | Mod: 95 | Performed by: INTERNAL MEDICINE

## 2021-04-06 DIAGNOSIS — G89.29 CHRONIC BACK PAIN, UNSPECIFIED BACK LOCATION, UNSPECIFIED BACK PAIN LATERALITY: ICD-10-CM

## 2021-04-06 DIAGNOSIS — M54.9 CHRONIC BACK PAIN, UNSPECIFIED BACK LOCATION, UNSPECIFIED BACK PAIN LATERALITY: ICD-10-CM

## 2021-04-06 DIAGNOSIS — S32.000S COMPRESSION FRACTURE OF LUMBAR VERTEBRA, UNSPECIFIED LUMBAR VERTEBRAL LEVEL, SEQUELA: ICD-10-CM

## 2021-04-06 RX ORDER — GABAPENTIN 300 MG/1
CAPSULE ORAL
Qty: 210 CAPSULE | Refills: 0 | Status: SHIPPED | OUTPATIENT
Start: 2021-04-06 | End: 2021-05-05

## 2021-04-06 NOTE — TELEPHONE ENCOUNTER
Requested Prescriptions   Pending Prescriptions Disp Refills     gabapentin (NEURONTIN) 300 MG capsule [Pharmacy Med Name: GABAPENTIN 300MG CAPSULES] 210 capsule      Sig: TAKE TWO CAPSULES IN THE MORNING AND AFTERNOON AND THREE AT BEDTIME       There is no refill protocol information for this order        Last Written Prescription Date:1/21/21  Last Fill Quantity: 270,  # refills: 1   Last office visit: 10/29/2020 with prescribing provider  Future Office Visit:   Next 5 appointments (look out 90 days)    May 13, 2021  8:00 AM  Return Visit with Moo Wise MD  [facility] ([facility] ) [address] [place] [address]  [place] [address]  [phone]           Routing refill request to provider for review/approval because:  Drug not on the [facility] refill protocol     Nirali KEENE-RN  Triage Nurse  [facility]: [facility]

## 2021-04-17 ENCOUNTER — HEALTH MAINTENANCE LETTER (OUTPATIENT)
Age: 36
End: 2021-04-17

## 2021-05-05 DIAGNOSIS — S32.000S COMPRESSION FRACTURE OF LUMBAR VERTEBRA, UNSPECIFIED LUMBAR VERTEBRAL LEVEL, SEQUELA: ICD-10-CM

## 2021-05-05 DIAGNOSIS — M54.9 CHRONIC BACK PAIN, UNSPECIFIED BACK LOCATION, UNSPECIFIED BACK PAIN LATERALITY: ICD-10-CM

## 2021-05-05 DIAGNOSIS — G89.29 CHRONIC BACK PAIN, UNSPECIFIED BACK LOCATION, UNSPECIFIED BACK PAIN LATERALITY: ICD-10-CM

## 2021-05-05 RX ORDER — GABAPENTIN 300 MG/1
CAPSULE ORAL
Qty: 210 CAPSULE | Refills: 0 | OUTPATIENT
Start: 2021-05-05

## 2021-05-05 NOTE — TELEPHONE ENCOUNTER
appt [facility] 57/21. Patient is out and would like enough until her visit. Please advise. States she is taking 3 caps tid. Perla Diego MA

## 2021-05-05 NOTE — TELEPHONE ENCOUNTER
Last Written Prescription Date:  4/6/21  Last Fill Quantity: 210,  # refills: 0   Last office visit: 9/18/2020 with prescribing provider:  Marielle Thompson   VIRTUAL visit 10/29/20 with Marielle Thompson with advised F/U in 3 months.  Future Office Visit:   Next 5 appointments (look out 90 days)    May 13, 2021  8:00 AM  Return Visit with Moo Wise MD  [facility] ([address] [address]  [phone]   May 18, 2021  2:00 PM  Office Visit with Robert Riley MD  [facility] ([facility] ) [address] [place] [id number] [phone]         Routing refill request to provider for review/approval because:  Drug not on the [facility] refill protocol   Overdue for F/U.    Med pended with reminder due for appt. Please advise.    Nancy Rogers, RN, BSN  [facility]

## 2021-05-06 RX ORDER — GABAPENTIN 300 MG/1
900 CAPSULE ORAL 3 TIMES DAILY
Qty: 27 CAPSULE | Refills: 0 | Status: SHIPPED | OUTPATIENT
Start: 2021-05-06 | End: 2021-05-12

## 2021-05-07 DIAGNOSIS — M54.9 CHRONIC BACK PAIN, UNSPECIFIED BACK LOCATION, UNSPECIFIED BACK PAIN LATERALITY: ICD-10-CM

## 2021-05-07 DIAGNOSIS — S32.000S COMPRESSION FRACTURE OF LUMBAR VERTEBRA, UNSPECIFIED LUMBAR VERTEBRAL LEVEL, SEQUELA: ICD-10-CM

## 2021-05-07 DIAGNOSIS — G89.29 CHRONIC BACK PAIN, UNSPECIFIED BACK LOCATION, UNSPECIFIED BACK PAIN LATERALITY: ICD-10-CM

## 2021-05-07 RX ORDER — GABAPENTIN 300 MG/1
CAPSULE ORAL
Qty: 27 CAPSULE | Refills: 0 | OUTPATIENT
Start: 2021-05-07

## 2021-05-07 NOTE — TELEPHONE ENCOUNTER
Requested Prescriptions   Pending Prescriptions Disp Refills    gabapentin (NEURONTIN) 300 MG capsule [Pharmacy Med Name: GABAPENTIN 300MG CAPSULES] 27 capsule 0     Sig: TAKE 3 CAPSULES(900 MG) BY MOUTH THREE TIMES DAILY       There is no refill protocol information for this order        Routing refill request to provider for review/approval because:  Failed protocol.  Nirali AWANN-RN  [facility]

## 2021-05-07 NOTE — TELEPHONE ENCOUNTER
was told she needed to be seen for refill- pt no showed; she should establish with an internal medicine provider. WILDER Peña, FNP-BC

## 2021-05-11 NOTE — PROGRESS NOTES
Francisca is a 35 year old who is being evaluated via a billable video visit.      How would you like to obtain your AVS? MyChart  If the phone visit is dropped, the invitation should be resent by: Text to cell phone: [phone]  Will anyone else be joining your phone visit? No    Phone Start Time: 12:20 PM    Assessment & Plan     (Z72.0) Tobacco abuse  (primary encounter diagnosis)      (S32.000S) Compression fracture of lumbar vertebra, unspecified lumbar vertebral level, sequela  Plan: gabapentin (NEURONTIN) 300 MG capsule    (M54.9,  G89.29) Chronic back pain, unspecified back location, unspecified back pain laterality  Plan: gabapentin (NEURONTIN) 300 MG capsule    (G47.8) Awakens from sleep at night  Plan: SLEEP EVALUATION & MANAGEMENT REFERRAL - [facility]          [phone] (Age 15 and up)            (R11.0) Nausea    Plan: ondansetron (ZOFRAN) 4 MG tablet            (G47.61) Periodic limb movement disorder    Plan: SLEEP EVALUATION & MANAGEMENT REFERRAL - [facility]          [phone] (Age 15 and up)       20 minutes spent on the date of the encounter doing chart review, history and exam, documentation and further activities per the note       Tobacco Cessation:   reports that she has been smoking cigarettes. She has been smoking about 0.50 packs per day. She has never used smokeless tobacco.  Tobacco Cessation Action Plan: Information offered: Patient not interested at this time    See Patient Instructions: advised pt to schedule sleep study, follow up as needed.    Return in about 6 months (around 11/12/2021), or if symptoms worsen or fail to improve.    Marielle Thompson, NISREEN  [facility] [place]    Subjective  - attempted video visit via Cognotion and delores- pt did not enter video chat so I called patient via phone.     Francisca is a 35 year old who presents for the following health issues     HPI      Chronic Pain Follow-Up- she reports gabapentin helps her pain, wondering if it can be increased.  Reviewed UTD best practice, can be increased to 1,200 mg 3 times daily for chronic pain.  Discussed respiratory depression risk with pt.     Where in your body do you have pain? Lower back and legs  How has your pain affected your ability to work? 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 Other: stretching and ice  How well are you sleeping? Poor  How has your mood been since your last visit? About the same  Have you had a significant life event? No  Other aggravating factors: prolonged sitting, prolonged standing and poor posture  Taking medication as directed? Yes    PHQ-9 SCORE 3/10/2021   PHQ-9 Total Score 15     LILIANA-7 SCORE 3/10/2021   Total Score 8     No flowsheet data found.  Encounter-Level CSA:    There are no encounter-level csa.     Patient-Level CSA:    There are no patient-level csa.         How many servings of fruits and vegetables do you eat daily?  0-1    On average, how many sweetened beverages do you drink each day (Examples: soda, juice, sweet tea, etc.  Do NOT count diet or artificially sweetened beverages)?   5    How many days per week do you exercise enough to make your heart beat faster? Stairs in apatrment    How many minutes a day do you exercise enough to make your heart beat faster?     How many days per week do you miss taking your medication? 0    Would like refill of her zofran for nausea.  It works well for her, no side effects. Jumps awake at night; has not had sleep study.     Review of Systems   Constitutional, HEENT, cardiovascular, pulmonary, GI, , musculoskeletal, neuro, skin, endocrine and psych systems are negative, except as otherwise noted.      Objective           Vitals:  No vitals were obtained today due to virtual visit.    Physical Exam   GENERAL:  "Healthy, alert and no distress.  RESP: No audible wheeze, cough, no shortness of breath with talking.  NEURO: Cranial nerves grossly intact.  Mentation and speech appropriate for age.  PSYCH: Mentation appears normal, affect normal/bright, judgement and insight intact, normal speech   Rest of exam not able to be preformed due to phone visit.     See orders    Phone-Visit Details    Type of service:  Phone Visit    phone End Time:12:33 PM    Originating Location (pt. Location): Other \"pt out and about.\" Guessing in car.    Distant Location (provider location):  [facility] [place]     Platform used for phone Visit: [employer]  "

## 2021-05-12 ENCOUNTER — VIRTUAL VISIT (OUTPATIENT)
Dept: FAMILY MEDICINE | Facility: CLINIC | Age: 36
End: 2021-05-12
Payer: COMMERCIAL

## 2021-05-12 ENCOUNTER — TELEPHONE (OUTPATIENT)
Dept: FAMILY MEDICINE | Facility: CLINIC | Age: 36
End: 2021-05-12

## 2021-05-12 DIAGNOSIS — M54.9 CHRONIC BACK PAIN, UNSPECIFIED BACK LOCATION, UNSPECIFIED BACK PAIN LATERALITY: ICD-10-CM

## 2021-05-12 DIAGNOSIS — G89.29 CHRONIC BACK PAIN, UNSPECIFIED BACK LOCATION, UNSPECIFIED BACK PAIN LATERALITY: ICD-10-CM

## 2021-05-12 DIAGNOSIS — G47.8 AWAKENS FROM SLEEP AT NIGHT: ICD-10-CM

## 2021-05-12 DIAGNOSIS — Z72.0 TOBACCO ABUSE: Primary | ICD-10-CM

## 2021-05-12 DIAGNOSIS — R11.0 NAUSEA: ICD-10-CM

## 2021-05-12 DIAGNOSIS — S32.000S COMPRESSION FRACTURE OF LUMBAR VERTEBRA, UNSPECIFIED LUMBAR VERTEBRAL LEVEL, SEQUELA: ICD-10-CM

## 2021-05-12 DIAGNOSIS — G47.61 PERIODIC LIMB MOVEMENT DISORDER: ICD-10-CM

## 2021-05-12 PROCEDURE — 99214 OFFICE O/P EST MOD 30 MIN: CPT | Mod: 95 | Performed by: NURSE PRACTITIONER

## 2021-05-12 RX ORDER — GABAPENTIN 300 MG/1
1200 CAPSULE ORAL 3 TIMES DAILY
Qty: 1080 CAPSULE | Refills: 1 | Status: SHIPPED | OUTPATIENT
Start: 2021-05-12 | End: 2021-11-19

## 2021-05-12 RX ORDER — ONDANSETRON 4 MG/1
4-8 TABLET, FILM COATED ORAL EVERY 8 HOURS PRN
Qty: 60 TABLET | Refills: 1 | Status: SHIPPED | OUTPATIENT
Start: 2021-05-12 | End: 2021-07-28

## 2021-05-12 NOTE — PROGRESS NOTES
Francisca is a 35 year old who is being evaluated via a billable video visit.      How would you like to obtain your AVS? MyChart  If the phone visit is dropped, the invitation should be resent by: Text to cell phone: [phone]  Will anyone else be joining your phone visit? No    Phone Start Time: 12:20 PM    Assessment & Plan     (Z72.0) Tobacco abuse  (primary encounter diagnosis)      (S32.000S) Compression fracture of lumbar vertebra, unspecified lumbar vertebral level, sequela  Plan: gabapentin (NEURONTIN) 300 MG capsule    (M54.9,  G89.29) Chronic back pain, unspecified back location, unspecified back pain laterality  Plan: gabapentin (NEURONTIN) 300 MG capsule    (G47.8) Awakens from sleep at night  Plan: SLEEP EVALUATION & MANAGEMENT REFERRAL - [facility] [place][note 3]          [phone] (Age 15 and up)            (R11.0) Nausea    Plan: ondansetron (ZOFRAN) 4 MG tablet            (G47.61) Periodic limb movement disorder    Plan: SLEEP EVALUATION & MANAGEMENT REFERRAL - [facility] [place][note 3]          [phone] (Age 15 and up)       25 minutes spent on the date of the encounter doing chart review, history and exam, documentation and further activities per the note       Tobacco Cessation:   reports that she has been smoking cigarettes. She has been smoking about 0.50 packs per day. She has never used smokeless tobacco.  Tobacco Cessation Action Plan: Information offered: Patient not interested at this time    See Patient Instructions: advised pt to schedule sleep study, follow up as needed.    Return in about 6 months (around 11/12/2021), or if symptoms worsen or fail to improve.    Marielle Thompson, NISREEN  [facility] [place]    Subjective  - attempted video visit via [employer] and delores- pt did not enter video chat so I called patient via phone.     Francisca is a 35 year old who presents for the following health issues     HPI      Chronic Pain Follow-Up- she reports gabapentin helps her pain, wondering if it can be increased.  Reviewed UTD best practice, can be increased to 1,200 mg 3 times daily for chronic pain.  Discussed respiratory depression risk with pt.     Where in your body do you have pain? Lower back and legs  How has your pain affected your ability to work? 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 Other: stretching and ice  How well are you sleeping? Poor  How has your mood been since your last visit? About the same  Have you had a significant life event? No  Other aggravating factors: prolonged sitting, prolonged standing and poor posture  Taking medication as directed? Yes    PHQ-9 SCORE 3/10/2021   PHQ-9 Total Score 15     LILIANA-7 SCORE 3/10/2021   Total Score 8     No flowsheet data found.  Encounter-Level CSA:    There are no encounter-level csa.     Patient-Level CSA:    There are no patient-level csa.         How many servings of fruits and vegetables do you eat daily?  0-1    On average, how many sweetened beverages do you drink each day (Examples: soda, juice, sweet tea, etc.  Do NOT count diet or artificially sweetened beverages)?   5    How many days per week do you exercise enough to make your heart beat faster? Stairs in apatrment    How many minutes a day do you exercise enough to make your heart beat faster?     How many days per week do you miss taking your medication? 0    Would like refill of her zofran for nausea.  It works well for her, no side effects. Jumps awake at night; has not had sleep study.     Review of Systems   Constitutional, HEENT, cardiovascular, pulmonary, GI, , musculoskeletal, neuro, skin, endocrine and psych systems are negative, except as otherwise noted.      Objective           Vitals:  No vitals were obtained today due to virtual visit.    Physical Exam   GENERAL:  "Healthy, alert and no distress.  RESP: No audible wheeze, cough, no shortness of breath with talking.  NEURO: Cranial nerves grossly intact.  Mentation and speech appropriate for age.  PSYCH: Mentation appears normal, affect normal/bright, judgement and insight intact, normal speech   Rest of exam not able to be preformed due to phone visit.     See orders    Phone-Visit Details    Type of service:  Phone Visit    phone End Time:12:33 PM    Originating Location (pt. Location): Other \"pt out and about.\" Guessing in car.    Distant Location (provider location):  [facility] [place]     Platform used for phone Visit: [employer]  "

## 2021-05-12 NOTE — PATIENT INSTRUCTIONS
Patient Education     Managing Chronic Pain   Being in pain can be exhausting. You may find you have trouble working, sleeping, or just doing day-to-day tasks. But you can learn to manage pain, feel better, and regain control of your life.    Understanding chronic pain  Chronic pain is a serious medical problem. It's defined as pain that lasts longer than 3 months. Chronic pain includes pain that you feel regularly, even if it comes and goes. The pain may be from an ongoing injury or health problem. Or it may be because of a chronic pain syndrome, such as fibromyalgia. Sometimes pain persists when no cause can be found.    Pain should be treated  You have a right to have your pain treated. Untreated chronic pain can affect your overall health. It can lead to depression, anxiety, anger, and personality changes. It can also disrupt work, sleep, relationships, and other aspects of normal life. It may not be possible to relieve all of your pain. But it can be reduced to a level you can cope with.    Your role in treatment  Your healthcare provider will work closely with you on a plan to manage your pain. But it s up to you to put this plan into action. Control of chronic pain is done mainly through self-management. This means that you take an active role in your care. Getting support from family and friends is important too.    Planning your treatment  Your healthcare provider will first look for a cause of your pain that can be treated. He or she will also assess your pain level. This may be done by asking you to rate your pain on a scale from 1 (low pain) to 10 (severe pain). Your provider will also ask you to describe the pain. For example, is your pain sharp or dull? Is it constant or does it come and go? You may be asked to keep a pain log. This is a diary in which you track your pain. It may help identify things that tend to make your pain worse. You and your healthcare provider can make a plan to help prevent  and cope with pain on a daily basis. In some cases, you may be referred to a special pain program or clinic. Your treatment plan may include:     Medicines    Complementary therapies    Mind and body therapies    Other medical treatments    Getting physical activity   Medicines  Medicine will most likely be a part of your treatment plan. Your provider will evaluate which are the best medicines for your pain. You may use over-the-counter or prescription medicines. You may need to take more than one medicine. It may take some time to find the best medicine or combination of medicines for your pain. Take all medicines as directed. Pain medicines can be used in many ways. You may take medicines:     Every day to help   stay ahead  of the pain so that it doesn t flare up    At times when pain is worse than usual    Before activities that tend to trigger pain    To decrease sensitivity to pain  Medicines for chronic pain include:    Nonsteroidal anti-inflammatory medicines (NSAIDs) for pain from swelling and inflammation. Your provider may prescribe a type of NSAID called a BEAULIEU inhibitor.    Acetaminophen    Anticonvulsants to treat nerve pain called neuropathy    Antidepressants to treat neuropathy    Muscle relaxants for muscle spasms    Topical medicines. These are put on the skin.    Opioids, or narcotics, to treat severe pain. These very strong medicines can help ease certain kinds of pain. They most often are used for short periods of time. Your provider will monitor your care very closely if you take opioids to reduce the risk for addiction.   Complementary therapies  These are treatments that can be used along with medical care to help relieve pain. Look for a licensed practitioner with experience treating chronic pain. Talk with your healthcare provider about using complementary therapies such as:     Massage    Physical therapy    Acupuncture and acupressure    Chiropractic    Vitamins or herbal  supplements     Naturopathy    Dry needling  Mind/body therapies  The brain and the body are both part of the pain response. The brain reads the pain signals from the body. This means that your mind has some control over how pain signals are processed. Mind/body therapies may help change how your brain reads pain signals. They may be learned with the help of a trained therapist or in a class. They include:     Deep breathing    Distraction    Visualization    Meditation    Biofeedback     Yoga  Other medical treatments  If other treatments don t work for you, one of these procedures or devices may help:    Nerve blocks to numb nerves in a painful area    Trigger point injections for painful muscles    Steroid injections for joint pain     Transcutaneous electrical nerve stimulation (TENS) to block pain signals to the brain    Spinal stimulation to block spinal pain    Implanted spinal pump that contains pain medicine    Ablation using heat, cold, or chemicals to destroy painful nerves                                                                                                                    Getting physical activity  Being physically active has many benefits. It can improve your ability to cope with pain. It may also help improve your mood, sleep, and overall health. Your healthcare provider can help you plan an exercise program that s right for your needs. This may include:     Stretching and range-of-motion exercises    Low-impact exercise such as walking, biking, swimming, and other water exercise    Strength training using light weights    Walking up the stairs instead of taking the elevator    Riding a bike instead of driving    Parking your car farther from your destination   You may need to avoid high-impact activities. These involve jumping, running, or sudden starts, stops, or changes of direction. If you haven t exercised in a long time or you have physical limitations, your healthcare provider may  refer you to a physical therapist. He or she can teach you stretches and exercises that fit your condition and fitness level.    Being active and healthy  A healthier lifestyle makes it easier to cope with pain and function better. Follow these tips:     Choose a balance of healthy foods and drinks.    Limit alcohol and caffeine.    Go to bed at about the same time each day and get enough restful sleep.    Don t let pain keep you from others. Spend time with friends and family.    Keep your mind active. Read books or take classes.    If you re not working, volunteer or join a club or social group.   Getting support  A support group lets you talk with others who also have chronic pain. Chronic pain support groups can help you feel less isolated. They can also give you tips for coping with pain. To find a local support group, contact your nearest hospital or pain clinic.    You may also want to try counseling. Counseling can help you learn coping skills and methods such as visualization. It can also help with mood problems. When choosing a counselor, look for someone who has worked with people who have chronic pain.    See your provider for regular visits and let him or her know how well treatments are working for you. Also reach out to family and friends for help and support.                               For more support and information, contact these groups:    American Academy of Pain Medicine, www.painmed.org    American Chronic Pain Association, www.theacpa.org    National Pain Foundation, www.nationalpainfoundation.org  Erich last reviewed this educational content on 8/1/2020 2000-2021 [employer]. All rights reserved. This information is not intended as a substitute for professional medical care. Always follow your healthcare professional's instructions.           Patient Education   Please make an appointment to follow up with your therapist and/or Psychiatric Clinic (phone: [phone])  within 1-3 days.     Return to the ED if you are having worsening thoughts/feelings of harming yourself or others, or any urgent/life-threatening concerns.     DISCHARGE RESOURCES:    [facility] [phone]     [facility] [phone] (detox), [phone] (outpatient & [facility])      Crisis Intervention: [phone] or 847-809-7087 (TTY: 607.173.6038).  Call anytime.    Suicide Awareness Voices of Education (SAVE) (www.save.org): 059-754-QOXI (2883)    National Suicide Prevention Line (www.mentalhealthmn.org): 525-137-LVGF (2182)    National [place] on Mental Illness (www.[place].org): 557.724.6990 or 671-205-3744.    Geex2pwwa: text the word LIFE to 53631 for immediate support and crisis intervention    Mental Health Consumer/Survivor Network of [place] (www.mhcsn.net): 220.122.7726 or 871-729-8358    Mental Health Association of [place] (www.mentalhealth.org): 851.190.5726 or 801-424-8914

## 2021-05-29 DIAGNOSIS — G89.29 CHRONIC BACK PAIN, UNSPECIFIED BACK LOCATION, UNSPECIFIED BACK PAIN LATERALITY: ICD-10-CM

## 2021-05-29 DIAGNOSIS — M54.9 CHRONIC BACK PAIN, UNSPECIFIED BACK LOCATION, UNSPECIFIED BACK PAIN LATERALITY: ICD-10-CM

## 2021-05-29 RX ORDER — IBUPROFEN 800 MG/1
TABLET, FILM COATED ORAL
Qty: 60 TABLET | Refills: 1 | Status: SHIPPED | OUTPATIENT
Start: 2021-05-29 | End: 2021-11-02

## 2021-06-25 NOTE — PROGRESS NOTES
1. Make sure you are on 0.5 mg of Ozempic injection  2. Refilled atorvastatin for cholesterol  3.  I ordered colonoscopy for your    [address] [place]  [phone] Clinic Note    CC:    Chief Complaint   Patient presents with     Follow Up     suboxone      HPI:  Francisca Reed is a 34 year old  who presents for suboxone follow up, initiated on . She missed her f/u last week, also having difficulty affording the medication, has been splitting up the dose, taking closer to 8mg per day, down from 12mg. She feels that she is not withdrawing on the lower dose, just having lots of anxiety, baseline though severe for her.     She would like to see a mental health specialist though no insurance, and unable to afford any additional care. States her  controls all the money including the child support she receives and hands over to him. He is unwilling to support her application for health care.      History: Using opioids (pills) for 10 years, first began after a back surgery, has used on an off, mostly gotten illicitly, using a combination of suboxone and oxy over past month prior to initiation, no consistent dosing. She does have a depression hx, also relatively severe postpartum depression, denies other mental health hx, denies abuse, violence, sexual trauma hx. Identifies as having an opioid addiction and wants help. ,, parenting children. Of note, she is not insured.      PMH:   Past Medical History:   Diagnosis Date     C section 2008     Papanicolaou smear of cervix with low grade squamous intraepithelial lesion (LGSIL) 2008 Pap: LSIL (see Care Everywhere)     SurgHx:   Past Surgical History:   Procedure Laterality Date     BACK SURGERY      fusion left side lower back     BREAST SURGERY  2010     broken bone      arm     C BREAST AUGMENTATION        SECTION        SECTION  2017    x2     GYN SURGERY       INJECT JOINT SACROILIAC       FamHx:   Family History   Problem Relation Age of Onset     Diabetes Father      SocHx:   Social History     Socioeconomic History     Marital status: Single      Spouse name: None     Number of children: None     Years of education: None     Highest education level: None   Occupational History     None   Social Needs     Financial resource strain: None     Food insecurity:     Worry: None     Inability: None     Transportation needs:     Medical: None     Non-medical: None   Tobacco Use     Smoking status: Current Every Day Smoker     Packs/day: 0.00     Types: Cigarettes     Smokeless tobacco: Never Used   Substance and Sexual Activity     Alcohol use: Never     Frequency: Never     Drug use: Yes     Types: Oxycodone     Sexual activity: Yes     Partners: Male     Birth control/protection: I.U.D.     Comment: mirena - placed 2 years ago   Lifestyle     Physical activity:     Days per week: None     Minutes per session: None     Stress: None   Relationships     Social connections:     Talks on phone: None     Gets together: None     Attends Pent[employer]al service: None     Active member of club or organization: None     Attends meetings of clubs or organizations: None     Relationship status: None     Intimate partner violence:     Fear of current or ex partner: None     Emotionally abused: None     Physically abused: None     Forced sexual activity: None   Other Topics Concern     Parent/sibling w/ CABG, MI or angioplasty before 65F 55M? Not Asked   Social History Narrative    ** Merged History Encounter **          Allergies:   Amoxicillin  Current Medications:   Current Outpatient Medications   Medication Sig Dispense Refill     acetaminophen (TYLENOL) 650 MG CR tablet Take 1 tablet by mouth every 8 hours as needed for pain. 100 tablet 11     buprenorphine-naloxone (SUBOXONE) 8-2 MG SUBL sublingual tablet Place 1 tablet under the tongue daily Patient is initiating suboxone, titrated to 8mg first day, final daily dose will be either 8mg, 12mg, or 16mg, patient given instructions on how to titrate to final dose. 21 tablet 0     Ibuprofen 200 MG capsule Take 200 mg by mouth every  8 hours as needed for pain.       levonorgestrel (MIRENA) 20 MCG/24HR IUD 1 each by Intrauterine route once       sertraline (ZOLOFT) 50 MG tablet Take 1 tablet (50 mg) by mouth daily Take 1/2 pill daily for the first week, then take one daily 60 tablet 3       ROS: 10 point ROS negative other than as noted in the HPI    EXAM:  Vitals:    10/14/19 1430   BP: 114/70   BP Location: Right arm   Patient Position: Chair   Cuff Size: Adult Regular   Pulse: 86   Weight: 55.5 kg (122 lb 6.4 oz)    Body mass index is 19.44 kg/m .    Gen: Alert, oriented, appropriately interactive, NAD  Exam not repeated today    Labs & Imaging: (9/9/19)  CMP wnl  PAP NIL, HPV-  Hep C-  HBSA-  Trep-  HIV-  GC/Chlam-    ASSESSMENT/PLAN: Francisca Reed is a 34 year old  who presents for suboxone follow up    Total time spent face to face was 20 minutes. Greater than 50% of the time was spent counseling and/or coordination or care.     1. Opioid use disorder (H)  - Transitioning to 8mg/day, will f/u results next appt, this will be more affordable for her  - Drug  Screen Comprehensive, Urine w/o Reported Meds (Pain Care Package)  - buprenorphine-naloxone (SUBOXONE) 8-2 MG SUBL sublingual tablet; Place 1 tablet under the tongue daily Patient is initiating suboxone, titrated to 8mg first day, final daily dose will be either 8mg, 12mg, or 16mg, patient given instructions on how to titrate to final dose.  Dispense: 21 tablet; Refill: 0    2. Postpartum depression  - I support her desire for mental health care, encouraged her to reach out to her [facility]  for resources   - She is requesting an antidepressant, prior suicidal ideation after starting Wellbutrin   - sertraline (ZOLOFT) 50 MG tablet; Take 1 tablet (50 mg) by mouth daily Take 1/2 pill daily for the first week, then take one daily  Dispense: 60 tablet; Refill: 3    Silvino Amado MD   10/16/2019 2:41 PM

## 2021-07-28 DIAGNOSIS — R11.0 NAUSEA: ICD-10-CM

## 2021-07-28 RX ORDER — ONDANSETRON 4 MG/1
TABLET, FILM COATED ORAL
Qty: 60 TABLET | Refills: 1 | Status: SHIPPED | OUTPATIENT
Start: 2021-07-28 | End: 2022-02-21

## 2021-10-02 ENCOUNTER — HEALTH MAINTENANCE LETTER (OUTPATIENT)
Age: 36
End: 2021-10-02

## 2021-10-28 ENCOUNTER — TELEPHONE (OUTPATIENT)
Dept: FAMILY MEDICINE | Facility: CLINIC | Age: 36
End: 2021-10-28

## 2021-10-28 DIAGNOSIS — S32.000S COMPRESSION FRACTURE OF LUMBAR VERTEBRA, UNSPECIFIED LUMBAR VERTEBRAL LEVEL, SEQUELA: ICD-10-CM

## 2021-10-28 DIAGNOSIS — M54.9 CHRONIC BACK PAIN, UNSPECIFIED BACK LOCATION, UNSPECIFIED BACK PAIN LATERALITY: ICD-10-CM

## 2021-10-28 DIAGNOSIS — R21 RASH: ICD-10-CM

## 2021-10-28 DIAGNOSIS — L29.9 ITCHING: ICD-10-CM

## 2021-10-28 DIAGNOSIS — F43.23 ADJUSTMENT DISORDER WITH MIXED ANXIETY AND DEPRESSED MOOD: Primary | ICD-10-CM

## 2021-10-28 DIAGNOSIS — J45.20 MILD INTERMITTENT ASTHMA WITHOUT COMPLICATION: ICD-10-CM

## 2021-10-28 DIAGNOSIS — G89.29 CHRONIC BACK PAIN, UNSPECIFIED BACK LOCATION, UNSPECIFIED BACK PAIN LATERALITY: ICD-10-CM

## 2021-10-28 RX ORDER — BACLOFEN 20 MG/1
TABLET ORAL
Qty: 90 TABLET | Refills: 0 | Status: SHIPPED | OUTPATIENT
Start: 2021-10-28 | End: 2021-11-26

## 2021-10-28 RX ORDER — ALBUTEROL SULFATE 90 UG/1
1-2 AEROSOL, METERED RESPIRATORY (INHALATION) EVERY 4 HOURS PRN
Qty: 18 G | Refills: 1 | Status: SHIPPED | OUTPATIENT
Start: 2021-10-28 | End: 2023-01-23

## 2021-10-28 RX ORDER — VENLAFAXINE HYDROCHLORIDE 37.5 MG/1
37.5 CAPSULE, EXTENDED RELEASE ORAL DAILY
Qty: 90 CAPSULE | Refills: 0 | Status: SHIPPED | OUTPATIENT
Start: 2021-10-28 | End: 2022-01-25

## 2021-10-28 RX ORDER — BUSPIRONE HYDROCHLORIDE 5 MG/1
5 TABLET ORAL
Qty: 90 TABLET | Refills: 0 | Status: SHIPPED | OUTPATIENT
Start: 2021-10-28 | End: 2022-02-21

## 2021-10-28 RX ORDER — VENLAFAXINE HYDROCHLORIDE 150 MG/1
150 CAPSULE, EXTENDED RELEASE ORAL DAILY
Qty: 90 CAPSULE | Refills: 0 | Status: SHIPPED | OUTPATIENT
Start: 2021-10-28 | End: 2022-03-03

## 2021-10-28 NOTE — TELEPHONE ENCOUNTER
"Patient has not been seen by PCP Marielle Thompson since virtual phone visit 5/12/21.      Plan:    Tobacco Cessation:   reports that she has been smoking cigarettes. She has been smoking about 0.50 packs per day. She has never used smokeless tobacco.  Tobacco Cessation Action Plan: Information offered: Patient not interested at this time    Return in about 6 months (around 11/12/2021), or if symptoms worsen or fail to improve.     NISREEN Mckenna  [facility]       Due for visit next month.   Assume the buspar is for smoking cessation?    She is scheduled for visit 11/11/21 as advised.    I called and spoke to Francisca.   She says she did not request the hydroxyzine, has plenty of that, is used PRN for itching.    She did request albuterol, baclofen, also wonders if Marielle would send Rx's for her buspar (for anxiety/depression) and venlafaxine (takes 150 mg plus 37.5 mg daily).   Says she missed an appointment with her psychiatrist and when she called to reschedule and ask for refills she was told her psychiatrist is \"not taking new patients\".   She is very frustrated, wonders if she needs to find a new psych provider?    At any rate, has been out of her buspar and venlafaxine for over a week.       Routed to PCP, meds sameer'd.    Henrietta Quinonez RN  [facility]      "

## 2021-10-28 NOTE — TELEPHONE ENCOUNTER
Medication Question or Refill    Who is calling: pt     What medication are you calling about (include dose and sig)?:   albuterol (PROAIR HFA/PROVENTIL HFA/VENTOLIN HFA) 108 (90 Base) MCG/ACT inhaler    baclofen (LIORESAL) 20 MG tablet [Pharmacy*90 tab*1     Pt requested to go back on Buspar 5mg, 3x daily, however I couldn't find in chart     * Maybe she meant since Buspar is discontinued   hydrOXYzine (VISTARIL) 50 MG capsule    Controlled Substance Agreement on file: No    Who prescribed the medication?: pcp    Do you need a refill? Yes: on all 3    When did you use the medication last? She said she's been out for awhile and really needs them before the wknd    Patient offered an appointment? Yes, 11/11 with pcp    Do you have any questions or concerns?  No    Requested Pharmacy: in chart    Okay to leave a detailed message?: Yes at Cell number on file:    Telephone Information:   Mobile [phone]

## 2021-11-02 DIAGNOSIS — M54.9 CHRONIC BACK PAIN, UNSPECIFIED BACK LOCATION, UNSPECIFIED BACK PAIN LATERALITY: ICD-10-CM

## 2021-11-02 DIAGNOSIS — G89.29 CHRONIC BACK PAIN, UNSPECIFIED BACK LOCATION, UNSPECIFIED BACK PAIN LATERALITY: ICD-10-CM

## 2021-11-02 RX ORDER — IBUPROFEN 800 MG/1
TABLET, FILM COATED ORAL
Qty: 60 TABLET | Refills: 0 | Status: SHIPPED | OUTPATIENT
Start: 2021-11-02 | End: 2022-03-07

## 2021-11-02 NOTE — TELEPHONE ENCOUNTER
"Requested Prescriptions   Pending Prescriptions Disp Refills     ibuprofen (ADVIL/MOTRIN) 800 MG tablet [Pharmacy Med Name: IBUPROFEN 800MG TABLETS] 60 tablet 1     Sig: TAKE 1 TABLET BY MOUTH EVERY 8 HOURS AS NEEDED FOR PAIN       NSAID Medications Passed - 11/2/2021  3:29 AM        Passed - Blood pressure under 140/90 in past 12 months     BP Readings from Last 3 Encounters:   01/13/21 120/70   11/17/20 110/70   09/18/20 130/81                 Passed - Normal ALT on file in past 12 months     Recent Labs   Lab Test 11/03/20  1353   ALT 19             Passed - Normal AST on file in past 12 months     Recent Labs   Lab Test 11/03/20  1353   AST 14             Passed - Recent (12 mo) or future (30 days) visit within the authorizing provider's specialty     Patient has had an office visit with the authorizing provider or a provider within the authorizing providers department within the previous 12 mos or has a future within next 30 days. See \"Patient Info\" tab in inbasket, or \"Choose Columns\" in Meds & Orders section of the refill encounter.              Passed - Patient is age 6-64 years        Passed - Normal CBC on file in past 12 months     Recent Labs   Lab Test 11/03/20  1353   WBC 9.1   RBC 4.21   HGB 13.9   HCT 42.2                    Passed - Medication is active on med list        Passed - No active pregnancy on record        Passed - Normal serum creatinine on file in past 12 months     Recent Labs   Lab Test 11/03/20  1353   CR 0.85       Ok to refill medication if creatinine is low          Passed - No positive pregnancy test in past 12 months           Prescription approved per [facility] Refill Protocol.  Nirali,RN,BSN  Triage Nurse  [facility]: [facility]      "

## 2021-12-03 DIAGNOSIS — G89.29 CHRONIC BACK PAIN, UNSPECIFIED BACK LOCATION, UNSPECIFIED BACK PAIN LATERALITY: ICD-10-CM

## 2021-12-03 DIAGNOSIS — M54.9 CHRONIC BACK PAIN, UNSPECIFIED BACK LOCATION, UNSPECIFIED BACK PAIN LATERALITY: ICD-10-CM

## 2021-12-03 DIAGNOSIS — S32.000S COMPRESSION FRACTURE OF LUMBAR VERTEBRA, UNSPECIFIED LUMBAR VERTEBRAL LEVEL, SEQUELA: ICD-10-CM

## 2021-12-07 ENCOUNTER — MYC MEDICAL ADVICE (OUTPATIENT)
Dept: FAMILY MEDICINE | Facility: CLINIC | Age: 36
End: 2021-12-07
Payer: COMMERCIAL

## 2021-12-07 RX ORDER — BACLOFEN 20 MG/1
TABLET ORAL
Qty: 30 TABLET | Refills: 0 | OUTPATIENT
Start: 2021-12-07

## 2021-12-12 NOTE — TELEPHONE ENCOUNTER
Routing refill request to provider for review/approval because:  Failed protocol, needs provider approval         You can access the FollowMyHealth Patient Portal offered by [facility] by registering at the following website: [email or web address]. By joining [facility]’s FollowMyHealth portal, you will also be able to view your health information using other applications (apps) compatible with our system.

## 2022-02-22 DIAGNOSIS — S32.000S COMPRESSION FRACTURE OF LUMBAR VERTEBRA, UNSPECIFIED LUMBAR VERTEBRAL LEVEL, SEQUELA: ICD-10-CM

## 2022-02-22 DIAGNOSIS — M54.9 CHRONIC BACK PAIN, UNSPECIFIED BACK LOCATION, UNSPECIFIED BACK PAIN LATERALITY: ICD-10-CM

## 2022-02-22 DIAGNOSIS — G89.29 CHRONIC BACK PAIN, UNSPECIFIED BACK LOCATION, UNSPECIFIED BACK PAIN LATERALITY: ICD-10-CM

## 2022-02-25 RX ORDER — BACLOFEN 20 MG/1
TABLET ORAL
Qty: 30 TABLET | Refills: 0 | OUTPATIENT
Start: 2022-02-25

## 2022-03-01 DIAGNOSIS — G89.29 CHRONIC BACK PAIN, UNSPECIFIED BACK LOCATION, UNSPECIFIED BACK PAIN LATERALITY: ICD-10-CM

## 2022-03-01 DIAGNOSIS — M54.9 CHRONIC BACK PAIN, UNSPECIFIED BACK LOCATION, UNSPECIFIED BACK PAIN LATERALITY: ICD-10-CM

## 2022-03-01 DIAGNOSIS — S32.000S COMPRESSION FRACTURE OF LUMBAR VERTEBRA, UNSPECIFIED LUMBAR VERTEBRAL LEVEL, SEQUELA: ICD-10-CM

## 2022-03-03 ENCOUNTER — OFFICE VISIT (OUTPATIENT)
Dept: FAMILY MEDICINE | Facility: CLINIC | Age: 37
End: 2022-03-03
Payer: COMMERCIAL

## 2022-03-03 VITALS
WEIGHT: 120.38 LBS | RESPIRATION RATE: 18 BRPM | HEART RATE: 110 BPM | HEIGHT: 67 IN | TEMPERATURE: 98.4 F | DIASTOLIC BLOOD PRESSURE: 64 MMHG | SYSTOLIC BLOOD PRESSURE: 110 MMHG | OXYGEN SATURATION: 97 % | BODY MASS INDEX: 18.89 KG/M2

## 2022-03-03 DIAGNOSIS — F43.23 ADJUSTMENT DISORDER WITH MIXED ANXIETY AND DEPRESSED MOOD: ICD-10-CM

## 2022-03-03 DIAGNOSIS — M54.9 CHRONIC BACK PAIN, UNSPECIFIED BACK LOCATION, UNSPECIFIED BACK PAIN LATERALITY: ICD-10-CM

## 2022-03-03 DIAGNOSIS — G89.29 CHRONIC BACK PAIN, UNSPECIFIED BACK LOCATION, UNSPECIFIED BACK PAIN LATERALITY: ICD-10-CM

## 2022-03-03 DIAGNOSIS — S32.000S COMPRESSION FRACTURE OF LUMBAR VERTEBRA, UNSPECIFIED LUMBAR VERTEBRAL LEVEL, SEQUELA: ICD-10-CM

## 2022-03-03 DIAGNOSIS — Z79.899 ENCOUNTER FOR LONG-TERM (CURRENT) USE OF MEDICATIONS: Primary | ICD-10-CM

## 2022-03-03 PROCEDURE — 99214 OFFICE O/P EST MOD 30 MIN: CPT | Performed by: PHYSICIAN ASSISTANT

## 2022-03-03 RX ORDER — BUSPIRONE HYDROCHLORIDE 5 MG/1
TABLET ORAL
Qty: 30 TABLET | Refills: 0 | Status: SHIPPED | OUTPATIENT
Start: 2022-03-03 | End: 2022-05-26

## 2022-03-03 RX ORDER — BACLOFEN 20 MG/1
TABLET ORAL
Qty: 30 TABLET | Refills: 0 | Status: SHIPPED | OUTPATIENT
Start: 2022-03-03 | End: 2022-05-06

## 2022-03-03 RX ORDER — BUSPIRONE HYDROCHLORIDE 5 MG/1
TABLET ORAL
Qty: 90 TABLET | Refills: 0 | Status: SHIPPED | OUTPATIENT
Start: 2022-03-03 | End: 2022-03-03

## 2022-03-03 RX ORDER — VENLAFAXINE HYDROCHLORIDE 150 MG/1
150 CAPSULE, EXTENDED RELEASE ORAL DAILY
Qty: 90 CAPSULE | Refills: 0 | Status: SHIPPED | OUTPATIENT
Start: 2022-03-03 | End: 2022-03-03

## 2022-03-03 RX ORDER — GABAPENTIN 300 MG/1
CAPSULE ORAL
Qty: 120 CAPSULE | Refills: 0 | Status: SHIPPED | OUTPATIENT
Start: 2022-03-03 | End: 2022-06-14

## 2022-03-03 RX ORDER — BACLOFEN 20 MG/1
TABLET ORAL
Qty: 30 TABLET | Refills: 0 | OUTPATIENT
Start: 2022-03-03

## 2022-03-03 RX ORDER — GABAPENTIN 300 MG/1
CAPSULE ORAL
Qty: 1080 CAPSULE | Refills: 0 | Status: SHIPPED | OUTPATIENT
Start: 2022-03-03 | End: 2022-03-03

## 2022-03-03 RX ORDER — VENLAFAXINE HYDROCHLORIDE 150 MG/1
150 CAPSULE, EXTENDED RELEASE ORAL DAILY
Qty: 30 CAPSULE | Refills: 0 | Status: SHIPPED | OUTPATIENT
Start: 2022-03-03 | End: 2022-05-06

## 2022-03-03 RX ORDER — VENLAFAXINE HYDROCHLORIDE 37.5 MG/1
37.5 CAPSULE, EXTENDED RELEASE ORAL DAILY
Qty: 30 CAPSULE | Refills: 0 | Status: SHIPPED | OUTPATIENT
Start: 2022-03-03 | End: 2022-05-06

## 2022-03-03 ASSESSMENT — ENCOUNTER SYMPTOMS
ABDOMINAL PAIN: 0
FEVER: 0
LIGHT-HEADEDNESS: 0
NAUSEA: 0
VOMITING: 0
CHILLS: 0
HEADACHES: 0
COUGH: 0
SHORTNESS OF BREATH: 0
NERVOUS/ANXIOUS: 0

## 2022-03-03 ASSESSMENT — ANXIETY QUESTIONNAIRES
3. WORRYING TOO MUCH ABOUT DIFFERENT THINGS: NEARLY EVERY DAY
1. FEELING NERVOUS, ANXIOUS, OR ON EDGE: NEARLY EVERY DAY
GAD7 TOTAL SCORE: 18
IF YOU CHECKED OFF ANY PROBLEMS ON THIS QUESTIONNAIRE, HOW DIFFICULT HAVE THESE PROBLEMS MADE IT FOR YOU TO DO YOUR WORK, TAKE CARE OF THINGS AT HOME, OR GET ALONG WITH OTHER PEOPLE: SOMEWHAT DIFFICULT
7. FEELING AFRAID AS IF SOMETHING AWFUL MIGHT HAPPEN: MORE THAN HALF THE DAYS
5. BEING SO RESTLESS THAT IT IS HARD TO SIT STILL: MORE THAN HALF THE DAYS
6. BECOMING EASILY ANNOYED OR IRRITABLE: MORE THAN HALF THE DAYS
2. NOT BEING ABLE TO STOP OR CONTROL WORRYING: NEARLY EVERY DAY

## 2022-03-03 ASSESSMENT — PAIN SCALES - GENERAL: PAINLEVEL: SEVERE PAIN (6)

## 2022-03-03 ASSESSMENT — PATIENT HEALTH QUESTIONNAIRE - PHQ9
SUM OF ALL RESPONSES TO PHQ QUESTIONS 1-9: 13
5. POOR APPETITE OR OVEREATING: NEARLY EVERY DAY

## 2022-03-03 NOTE — PATIENT INSTRUCTIONS
You have been provided with approximately 2-4 weeks of refills of your chronic medications.  As discussed, you must follow-up with your primary care provider specifically for any additional refills.

## 2022-03-03 NOTE — PROGRESS NOTES
Assessment & Plan     Encounter for long-term (current) use of medications  Compression fracture of lumbar vertebra, unspecified lumbar vertebral level, sequela  Chronic back pain, unspecified back location, unspecified back pain laterality  Adjustment disorder with mixed anxiety and depressed mood    Patient is a 36-year-old female who presents to clinic for refill of chronic medications including baclofen and gabapentin.  Also requesting refills of Effexor and BuSpar.  Patient denies any illicit drug or alcohol use, but previous urine drug screen suggest otherwise.  PDMP reviewed.  Given risks of withdrawal/side effects off of these medications, short-term refills provided.  Discussed the importance of follow-up with primary care for further management.  Offered scheduling, but patient declined.  Patient declines interest in pain management clinic.  No additional refills will be provided.  Would highly consider referral to pain clinic in future if patient agreeable.    - baclofen (LIORESAL) 20 MG tablet; TAKE 1 TABLET BY MOUTH THREE TIMES DAILY due for follow up for refills.  - gabapentin (NEURONTIN) 300 MG capsule; Take 4 capsules by mouth 3 times daily. Due for follow up for further refills.  - venlafaxine (EFFEXOR-XR) 37.5 MG 24 hr capsule; Take 1 capsule (37.5 mg) by mouth daily Along with 150 mg dose. Due for follow up for refills.  - venlafaxine (EFFEXOR-XR) 150 MG 24 hr capsule; Take 1 capsule (150 mg) by mouth daily Take along with 37.5 mg capsule daily  - busPIRone (BUSPAR) 5 MG tablet; TAKE 1 TABLET(5 MG) BY MOUTH TWICE DAILY. MAY TAKE A THIRD TABLET DAILY AS NEEDED; due for follow up     See Patient Instructions    Return in about 2 weeks (around 3/17/2022) for Physical Exam.    Theresa Sanchez PA-C  [facility] LEONARDO Espinosa is a 36 year old who presents for the following health issues     HPI     Chronic/Recurring Back Pain Follow Up  Baclofen 20mg every day, gabapentin  "1,200mg tid    Where is your back pain located? (Select all that apply) low back bilateral    How would you describe your back pain?  Constant dull ache with some sharp pain    Where does your back pain spread? nowhere    Since your last clinic visit for back pain, how has your pain changed? unchanged    Does your back pain interfere with your job? Not applicable    Since your last visit, have you tried any new treatment? No    Patient requesting refills of Gabapentin, Buspar, Baclofen, and Effexor. She notes she has been more anxious as she ran out of medications within the last week. Patient denies use of alcohol or other drugs.     Review of Systems   Constitutional: Negative for chills and fever.   Respiratory: Negative for cough and shortness of breath.    Cardiovascular: Negative for chest pain.   Gastrointestinal: Negative for abdominal pain, nausea and vomiting.   Skin: Negative for rash.   Neurological: Negative for light-headedness and headaches.   Psychiatric/Behavioral: The patient is not nervous/anxious.             Objective    /64   Pulse 110   Temp 98.4  F (36.9  C) (Tympanic)   Resp 18   Ht 1.702 m (5' 7\")   Wt 54.6 kg (120 lb 6 oz)   LMP  (LMP Unknown)   SpO2 97%   Breastfeeding No   BMI 18.85 kg/m    Body mass index is 18.85 kg/m .  Physical Exam  Vitals and nursing note reviewed.   Constitutional:       General: She is not in acute distress.     Appearance: Normal appearance.   HENT:      Head: Normocephalic and atraumatic.   Eyes:      Extraocular Movements: Extraocular movements intact.      Pupils: Pupils are equal, round, and reactive to light.   Cardiovascular:      Rate and Rhythm: Normal rate and regular rhythm.      Heart sounds: Normal heart sounds.   Pulmonary:      Effort: Pulmonary effort is normal.      Breath sounds: Normal breath sounds.   Musculoskeletal:         General: Normal range of motion.      Cervical back: Normal range of motion.   Skin:     General: Skin " is warm and dry.   Neurological:      General: No focal deficit present.      Mental Status: She is alert.   Psychiatric:         Mood and Affect: Mood is anxious.         Behavior: Behavior is cooperative.

## 2022-03-04 DIAGNOSIS — M54.9 CHRONIC BACK PAIN, UNSPECIFIED BACK LOCATION, UNSPECIFIED BACK PAIN LATERALITY: ICD-10-CM

## 2022-03-04 DIAGNOSIS — G89.29 CHRONIC BACK PAIN, UNSPECIFIED BACK LOCATION, UNSPECIFIED BACK PAIN LATERALITY: ICD-10-CM

## 2022-03-04 ASSESSMENT — ANXIETY QUESTIONNAIRES: GAD7 TOTAL SCORE: 18

## 2022-03-04 NOTE — TELEPHONE ENCOUNTER
Requested Prescriptions   Pending Prescriptions Disp Refills    baclofen (LIORESAL) 20 MG tablet 30 tablet 0     Sig: TAKE 1 TABLET BY MOUTH THREE TIMES DAILY due for follow up for refills.        There is no refill protocol information for this order         Routing refill request to provider for review/approval because:  Drug not on the [facility] refill protocol       
See 2/22/22 refill encounter. Perla Diego MA    
Was told to follow up for refills and did not. WILDER Peña, FNP-BC    
solids

## 2022-03-07 RX ORDER — IBUPROFEN 800 MG/1
TABLET, FILM COATED ORAL
Qty: 60 TABLET | Refills: 0 | Status: SHIPPED | OUTPATIENT
Start: 2022-03-07 | End: 2022-04-19

## 2022-03-07 NOTE — TELEPHONE ENCOUNTER
Routing refill request to provider for review/approval because:  Drug not on the [facility] refill protocol    NSAID Medications Failed 03/04/2022 08:10 AM   Protocol Details  Normal ALT on file in past 12 months    Normal AST on file in past 12 months    Normal CBC on file in past 12 months    Normal serum creatinine on file in past 12 months

## 2022-03-29 ENCOUNTER — TELEPHONE (OUTPATIENT)
Dept: FAMILY MEDICINE | Facility: CLINIC | Age: 37
End: 2022-03-29
Payer: COMMERCIAL

## 2022-03-29 ASSESSMENT — ASTHMA QUESTIONNAIRES
ACT_TOTALSCORE: 25
QUESTION_1 LAST FOUR WEEKS HOW MUCH OF THE TIME DID YOUR ASTHMA KEEP YOU FROM GETTING AS MUCH DONE AT WORK, SCHOOL OR AT HOME: NONE OF THE TIME
QUESTION_2 LAST FOUR WEEKS HOW OFTEN HAVE YOU HAD SHORTNESS OF BREATH: NOT AT ALL
QUESTION_4 LAST FOUR WEEKS HOW OFTEN HAVE YOU USED YOUR RESCUE INHALER OR NEBULIZER MEDICATION (SUCH AS ALBUTEROL): NOT AT ALL
QUESTION_3 LAST FOUR WEEKS HOW OFTEN DID YOUR ASTHMA SYMPTOMS (WHEEZING, COUGHING, SHORTNESS OF BREATH, CHEST TIGHTNESS OR PAIN) WAKE YOU UP AT NIGHT OR EARLIER THAN USUAL IN THE MORNING: NOT AT ALL
ACT_TOTALSCORE: 25
QUESTION_5 LAST FOUR WEEKS HOW WOULD YOU RATE YOUR ASTHMA CONTROL: COMPLETELY CONTROLLED

## 2022-04-01 DIAGNOSIS — G89.29 CHRONIC BACK PAIN, UNSPECIFIED BACK LOCATION, UNSPECIFIED BACK PAIN LATERALITY: ICD-10-CM

## 2022-04-01 DIAGNOSIS — M54.9 CHRONIC BACK PAIN, UNSPECIFIED BACK LOCATION, UNSPECIFIED BACK PAIN LATERALITY: ICD-10-CM

## 2022-04-01 DIAGNOSIS — S32.000S COMPRESSION FRACTURE OF LUMBAR VERTEBRA, UNSPECIFIED LUMBAR VERTEBRAL LEVEL, SEQUELA: ICD-10-CM

## 2022-04-03 NOTE — TELEPHONE ENCOUNTER
Routing refill request to provider for review/approval because:  Drug not on the [facility] refill protocol.  Marilou Ellison RN

## 2022-04-04 RX ORDER — BACLOFEN 20 MG/1
TABLET ORAL
Qty: 30 TABLET | Refills: 0 | OUTPATIENT
Start: 2022-04-04

## 2022-04-05 NOTE — TELEPHONE ENCOUNTER
LM to call back to schedule in clinic follow up visit before refilled will be completed.  Rachael Nicolas CMA

## 2022-04-18 ENCOUNTER — TELEPHONE (OUTPATIENT)
Dept: FAMILY MEDICINE | Facility: CLINIC | Age: 37
End: 2022-04-18
Payer: COMMERCIAL

## 2022-04-18 NOTE — TELEPHONE ENCOUNTER
Prior Authorization Retail Medication Request    Medication/Dose: baclofen (LIORESAL) 20 MG tablet  ICD code (if different than what is on RX):  S32.000S/M54.9, G89.29   Previously Tried and Failed:  na  Rationale:  na    Insurance Name:  NONE  Insurance ID:  na      Pharmacy Information (if different than what is on RX)  Name:  [facility]  Phone:  [phone]

## 2022-04-19 DIAGNOSIS — G89.29 CHRONIC BACK PAIN, UNSPECIFIED BACK LOCATION, UNSPECIFIED BACK PAIN LATERALITY: ICD-10-CM

## 2022-04-19 DIAGNOSIS — M54.9 CHRONIC BACK PAIN, UNSPECIFIED BACK LOCATION, UNSPECIFIED BACK PAIN LATERALITY: ICD-10-CM

## 2022-04-19 RX ORDER — IBUPROFEN 800 MG/1
TABLET, FILM COATED ORAL
Qty: 60 TABLET | Refills: 0 | Status: SHIPPED | OUTPATIENT
Start: 2022-04-19 | End: 2022-06-14

## 2022-04-19 NOTE — TELEPHONE ENCOUNTER
"Requested Prescriptions   Pending Prescriptions Disp Refills    ibuprofen (ADVIL/MOTRIN) 800 MG tablet 60 tablet 0     Sig: TAKE 1 TABLET BY MOUTH EVERY 8 HOURS AS NEEDED FOR PAIN; take with food        NSAID Medications Failed - 4/19/2022  7:25 AM        Failed - Normal ALT on file in past 12 months     Recent Labs   Lab Test 11/03/20  1353   ALT 19               Failed - Normal AST on file in past 12 months       Recent Labs   Lab Test 11/03/20  1353   AST 14             Failed - Normal CBC on file in past 12 months     Recent Labs   Lab Test 11/03/20  1353   WBC 9.1   RBC 4.21   HGB 13.9   HCT 42.2                      Failed - Normal serum creatinine on file in past 12 months     Recent Labs   Lab Test 11/03/20  1353   CR 0.85       Ok to refill medication if creatinine is low          Passed - Blood pressure under 140/90 in past 12 months       BP Readings from Last 3 Encounters:   03/03/22 110/64   01/13/21 120/70   11/17/20 110/70                 Passed - Recent (12 mo) or future (30 days) visit within the authorizing provider's specialty     Patient has had an office visit with the authorizing provider or a provider within the authorizing providers department within the previous 12 mos or has a future within next 30 days. See \"Patient Info\" tab in inbasket, or \"Choose Columns\" in Meds & Orders section of the refill encounter.              Passed - Patient is age 6-64 years        Passed - Medication is active on med list        Passed - No active pregnancy on record        Passed - No positive pregnancy test in past 12 months            Routing refill request to provider for review/approval because:  Failed protocol.  Nirali Cha RN, BSN  Triage nurse  [facility]: [place]"

## 2022-04-20 NOTE — TELEPHONE ENCOUNTER
Central Prior Authorization Team   Phone: [phone]      PA Initiation    Medication: baclofen (LIORESAL) 20 MG tablet--INITIATED  Insurance Company: [employer] - Phone [phone] Fax [phone]  Pharmacy Filling the Rx: [id number] - [place], [place] - [address] [place] [address] AT [facility] & [address]  Filling Pharmacy Phone: [phone]  Filling Pharmacy Fax:    Start Date: 4/20/2022

## 2022-04-20 NOTE — TELEPHONE ENCOUNTER
Central Prior Authorization Team   Phone: [phone]      Prior Authorization Not Needed per Insurance    Medication: baclofen (LIORESAL) 20 MG tablet--NOT NEEDED  Insurance Company: [employer] - Phone [phone] Fax [phone]  Expected CoPay:      Pharmacy Filling the Rx: [facility] #[id number] - [place], [place] - [facility] [address] AT [facility] & [address]  Pharmacy Notified: Yes  Patient Notified: Yes

## 2022-04-29 DIAGNOSIS — F43.23 ADJUSTMENT DISORDER WITH MIXED ANXIETY AND DEPRESSED MOOD: ICD-10-CM

## 2022-04-29 DIAGNOSIS — G89.29 CHRONIC BACK PAIN, UNSPECIFIED BACK LOCATION, UNSPECIFIED BACK PAIN LATERALITY: ICD-10-CM

## 2022-04-29 DIAGNOSIS — M54.9 CHRONIC BACK PAIN, UNSPECIFIED BACK LOCATION, UNSPECIFIED BACK PAIN LATERALITY: ICD-10-CM

## 2022-04-29 DIAGNOSIS — S32.000S COMPRESSION FRACTURE OF LUMBAR VERTEBRA, UNSPECIFIED LUMBAR VERTEBRAL LEVEL, SEQUELA: ICD-10-CM

## 2022-05-03 RX ORDER — BACLOFEN 20 MG/1
TABLET ORAL
Qty: 30 TABLET | Refills: 0 | OUTPATIENT
Start: 2022-05-03

## 2022-05-03 RX ORDER — VENLAFAXINE HYDROCHLORIDE 37.5 MG/1
37.5 CAPSULE, EXTENDED RELEASE ORAL DAILY
Qty: 30 CAPSULE | Refills: 0 | OUTPATIENT
Start: 2022-05-03

## 2022-05-04 RX ORDER — BACLOFEN 20 MG/1
TABLET ORAL
Qty: 30 TABLET | Refills: 0 | OUTPATIENT
Start: 2022-05-04

## 2022-05-04 RX ORDER — VENLAFAXINE HYDROCHLORIDE 150 MG/1
150 CAPSULE, EXTENDED RELEASE ORAL DAILY
Qty: 30 CAPSULE | Refills: 0 | OUTPATIENT
Start: 2022-05-04

## 2022-05-04 RX ORDER — VENLAFAXINE HYDROCHLORIDE 37.5 MG/1
37.5 CAPSULE, EXTENDED RELEASE ORAL DAILY
Qty: 30 CAPSULE | Refills: 0 | OUTPATIENT
Start: 2022-05-04

## 2022-05-04 NOTE — TELEPHONE ENCOUNTER
Patient states that she already saw Dr. Sanchez on 3/3/2022 and declined scheduling.   She is requesting for refills.       [id number], MEDICAL ASSISTANT

## 2022-05-04 NOTE — TELEPHONE ENCOUNTER
Note from patient's appointment with Theresa Sanchez on 3/3/22       Return in about 2 weeks (around 3/17/2022) for Physical Exam.  You have been provided with approximately 2-4 weeks of refills of your chronic medications.  As discussed, you must follow-up with your primary care provider specifically for any additional refills.          Please assist patient with scheduling with PCP if she is in need of refills.     Thea Wilkinson RN

## 2022-05-04 NOTE — TELEPHONE ENCOUNTER
Next Appt  With Family Practice (Marielle Thompson NP)  05/31/2022 at 2:30 PM    Francisca says she is out of her meds and would like a refill now that she is scheduled       Rosario Coronel on 5/4/2022 at 1:59 PM

## 2022-05-06 ENCOUNTER — TELEPHONE (OUTPATIENT)
Dept: FAMILY MEDICINE | Facility: CLINIC | Age: 37
End: 2022-05-06
Payer: COMMERCIAL

## 2022-05-06 DIAGNOSIS — F43.23 ADJUSTMENT DISORDER WITH MIXED ANXIETY AND DEPRESSED MOOD: ICD-10-CM

## 2022-05-06 DIAGNOSIS — M54.9 CHRONIC BACK PAIN, UNSPECIFIED BACK LOCATION, UNSPECIFIED BACK PAIN LATERALITY: ICD-10-CM

## 2022-05-06 DIAGNOSIS — G89.29 CHRONIC BACK PAIN, UNSPECIFIED BACK LOCATION, UNSPECIFIED BACK PAIN LATERALITY: ICD-10-CM

## 2022-05-06 DIAGNOSIS — S32.000S COMPRESSION FRACTURE OF LUMBAR VERTEBRA, UNSPECIFIED LUMBAR VERTEBRAL LEVEL, SEQUELA: ICD-10-CM

## 2022-05-06 RX ORDER — VENLAFAXINE HYDROCHLORIDE 150 MG/1
150 CAPSULE, EXTENDED RELEASE ORAL DAILY
Qty: 30 CAPSULE | Refills: 0 | Status: SHIPPED | OUTPATIENT
Start: 2022-05-06 | End: 2022-06-14

## 2022-05-06 RX ORDER — BACLOFEN 20 MG/1
TABLET ORAL
Qty: 30 TABLET | Refills: 0 | Status: SHIPPED | OUTPATIENT
Start: 2022-05-06 | End: 2022-05-26

## 2022-05-06 RX ORDER — VENLAFAXINE HYDROCHLORIDE 37.5 MG/1
37.5 CAPSULE, EXTENDED RELEASE ORAL DAILY
Qty: 30 CAPSULE | Refills: 0 | Status: SHIPPED | OUTPATIENT
Start: 2022-05-06 | End: 2022-06-14

## 2022-05-06 NOTE — TELEPHONE ENCOUNTER
Please call patient and let her know that she was provided with 1 month of refills of medications.  She must keep appointment 5/31/2022 with primary care provider, Marielle, for any additional refills.  No further refills will be provided until this appointment is completed.     Theresa Sanchez PA-C on 5/6/2022 at 8:35 AM

## 2022-05-14 ENCOUNTER — HEALTH MAINTENANCE LETTER (OUTPATIENT)
Age: 37
End: 2022-05-14

## 2022-05-23 ENCOUNTER — TELEPHONE (OUTPATIENT)
Dept: FAMILY MEDICINE | Facility: CLINIC | Age: 37
End: 2022-05-23

## 2022-05-23 DIAGNOSIS — M54.9 CHRONIC BACK PAIN, UNSPECIFIED BACK LOCATION, UNSPECIFIED BACK PAIN LATERALITY: ICD-10-CM

## 2022-05-23 DIAGNOSIS — S32.000S COMPRESSION FRACTURE OF LUMBAR VERTEBRA, UNSPECIFIED LUMBAR VERTEBRAL LEVEL, SEQUELA: ICD-10-CM

## 2022-05-23 DIAGNOSIS — G89.29 CHRONIC BACK PAIN, UNSPECIFIED BACK LOCATION, UNSPECIFIED BACK PAIN LATERALITY: ICD-10-CM

## 2022-05-23 RX ORDER — BACLOFEN 20 MG/1
TABLET ORAL
Qty: 30 TABLET | Refills: 0 | Status: CANCELLED | OUTPATIENT
Start: 2022-05-23

## 2022-05-23 NOTE — TELEPHONE ENCOUNTER
Patient is calling to see if you would be willing to sent an antibiotic to her pharmacy for a tooth infection.  She also needs a refill of her baclofen to make it to her appointment.  Please advise.    Thank you  Merlene Parish

## 2022-05-23 NOTE — TELEPHONE ENCOUNTER
This RN initially spoke with pt who reports lt upper tooth pain (know broken tooth) past couple weeks, now has swelling lt side of face upwards to eye. Has felt feverish, has not checked temp. No redness, warmth. Has dentis appt 06-07-22, first available appt.  Call warm transferred to  who connected her to primary clinic for advised appt.   GLADYS Barrientos RN

## 2022-05-23 NOTE — TELEPHONE ENCOUNTER
I also see refill baclofen was requested, 30 tabs is only a 10 day supply, that was sent on 5/6/22.    Attempted to call patient at home/mobile number to advised her she needs evaluation of tooth issue.    No answer, rang many times, no voicemail option.   Will need to try another time.  I see she is scheduled with an [facility] provider tomorrow for the tooth infection.    Also, routed refill request back to PCP, patient is out of baclofen, scheduled for 5/31.    Henrietta Quinonez RN  [facility]

## 2022-05-25 DIAGNOSIS — M54.9 CHRONIC BACK PAIN, UNSPECIFIED BACK LOCATION, UNSPECIFIED BACK PAIN LATERALITY: ICD-10-CM

## 2022-05-25 DIAGNOSIS — S32.000S COMPRESSION FRACTURE OF LUMBAR VERTEBRA, UNSPECIFIED LUMBAR VERTEBRAL LEVEL, SEQUELA: ICD-10-CM

## 2022-05-25 DIAGNOSIS — R11.0 NAUSEA: ICD-10-CM

## 2022-05-25 DIAGNOSIS — G89.29 CHRONIC BACK PAIN, UNSPECIFIED BACK LOCATION, UNSPECIFIED BACK PAIN LATERALITY: ICD-10-CM

## 2022-05-25 DIAGNOSIS — F43.23 ADJUSTMENT DISORDER WITH MIXED ANXIETY AND DEPRESSED MOOD: ICD-10-CM

## 2022-05-26 RX ORDER — BACLOFEN 20 MG/1
TABLET ORAL
Qty: 30 TABLET | Refills: 0 | Status: SHIPPED | OUTPATIENT
Start: 2022-05-26 | End: 2022-06-14

## 2022-05-26 RX ORDER — ONDANSETRON 4 MG/1
TABLET, FILM COATED ORAL
Qty: 10 TABLET | Refills: 0 | OUTPATIENT
Start: 2022-05-26

## 2022-05-26 RX ORDER — BUSPIRONE HYDROCHLORIDE 5 MG/1
TABLET ORAL
Qty: 30 TABLET | Refills: 0 | Status: SHIPPED | OUTPATIENT
Start: 2022-05-26 | End: 2022-06-14

## 2022-05-26 NOTE — TELEPHONE ENCOUNTER
Routing refill request to provider for review/approval because:  Verna given x1 and patient did not follow up, please advise

## 2022-05-31 DIAGNOSIS — M54.9 CHRONIC BACK PAIN, UNSPECIFIED BACK LOCATION, UNSPECIFIED BACK PAIN LATERALITY: ICD-10-CM

## 2022-05-31 DIAGNOSIS — G89.29 CHRONIC BACK PAIN, UNSPECIFIED BACK LOCATION, UNSPECIFIED BACK PAIN LATERALITY: ICD-10-CM

## 2022-06-01 NOTE — TELEPHONE ENCOUNTER
Routing refill request to provider for review/approval because:  Drug not on the [facility] refill protocol    NSAID Medications Failed 05/31/2022 08:52 AM   Protocol Details  Normal ALT on file in past 12 months    Normal AST on file in past 12 months    Normal CBC on file in past 12 months    Normal serum creatinine on file in past 12 months

## 2022-06-02 RX ORDER — IBUPROFEN 800 MG/1
TABLET, FILM COATED ORAL
Qty: 60 TABLET | Refills: 0 | OUTPATIENT
Start: 2022-06-02

## 2022-06-06 DIAGNOSIS — M54.9 CHRONIC BACK PAIN, UNSPECIFIED BACK LOCATION, UNSPECIFIED BACK PAIN LATERALITY: ICD-10-CM

## 2022-06-06 DIAGNOSIS — S32.000S COMPRESSION FRACTURE OF LUMBAR VERTEBRA, UNSPECIFIED LUMBAR VERTEBRAL LEVEL, SEQUELA: ICD-10-CM

## 2022-06-06 DIAGNOSIS — G89.29 CHRONIC BACK PAIN, UNSPECIFIED BACK LOCATION, UNSPECIFIED BACK PAIN LATERALITY: ICD-10-CM

## 2022-06-06 DIAGNOSIS — F43.23 ADJUSTMENT DISORDER WITH MIXED ANXIETY AND DEPRESSED MOOD: ICD-10-CM

## 2022-06-09 RX ORDER — VENLAFAXINE HYDROCHLORIDE 37.5 MG/1
37.5 CAPSULE, EXTENDED RELEASE ORAL DAILY
Qty: 30 CAPSULE | Refills: 0 | OUTPATIENT
Start: 2022-06-09

## 2022-06-09 RX ORDER — VENLAFAXINE HYDROCHLORIDE 150 MG/1
150 CAPSULE, EXTENDED RELEASE ORAL DAILY
Qty: 5 CAPSULE | Refills: 0 | OUTPATIENT
Start: 2022-06-09

## 2022-06-09 RX ORDER — BUSPIRONE HYDROCHLORIDE 5 MG/1
TABLET ORAL
Qty: 10 TABLET | Refills: 0 | OUTPATIENT
Start: 2022-06-09

## 2022-06-09 RX ORDER — VENLAFAXINE HYDROCHLORIDE 37.5 MG/1
37.5 CAPSULE, EXTENDED RELEASE ORAL DAILY
Qty: 5 CAPSULE | Refills: 0 | OUTPATIENT
Start: 2022-06-09

## 2022-06-09 RX ORDER — BACLOFEN 20 MG/1
TABLET ORAL
Qty: 15 TABLET | Refills: 0 | OUTPATIENT
Start: 2022-06-09

## 2022-06-09 RX ORDER — GABAPENTIN 300 MG/1
CAPSULE ORAL
Qty: 120 CAPSULE | Refills: 0 | OUTPATIENT
Start: 2022-06-09

## 2022-06-09 NOTE — TELEPHONE ENCOUNTER
Routing refill request to provider for review/approval because:  Labs not current:  creatinine  PHQ-9  Not on protocol    Creatinine   Date Value Ref Range Status   11/03/2020 0.85 0.52 - 1.04 mg/dL Final      PHQ-9 score:    PHQ 3/3/2022   PHQ-9 Total Score 13   Q9: Thoughts of better off dead/self-harm past 2 weeks Not at all                      Pending Prescriptions:                       Disp   Refills    venlafaxine (EFFEXOR XR) 37.5 MG 24 hr cap*30 cap*0        Sig: Take 1 capsule (37.5 mg) by mouth daily Along with           150 mg dose. Due for follow up for refills.    gabapentin (NEURONTIN) 300 MG capsule      120 ca*0        Sig: Take 4 capsules by mouth 3 times daily. Due for           follow up for further refills.        Rosalio Cortes RN

## 2022-06-10 NOTE — PROGRESS NOTES
"   SUBJECTIVE:   CC: Francisca Reed is an 37 year old woman who presents for preventive health visit.       Patient has been advised of split billing requirements and indicates understanding: {Yes and No:735543}  HPI    Patient would still like to discuss the following concern(s):  1. ***  2. ***  3. ***       Today's PHQ-2 Score:   PHQ-2 ( 1999 Pfizer) 3/3/2022   Q1: Little interest or pleasure in doing things 0   Q2: Feeling down, depressed or hopeless 1   PHQ-2 Score 1   PHQ-2 Total Score (12-17 Years)- Positive if 3 or more points; Administer PHQ-A if positive -       Abuse: Current or Past (Physical, Sexual or Emotional) - { :414175}  Do you feel safe in your environment? { :953157}    Have you ever done Advance Care Planning? (For example, a Health Directive, POLST, or a discussion with a medical provider or your loved ones about your wishes): { :003767}    Social History     Tobacco Use     Smoking status: Current Every Day Smoker     Packs/day: 0.50     Types: Cigarettes     Smokeless tobacco: Never Used   Substance Use Topics     Alcohol use: Not Currently     Comment: rare         No flowsheet data found.    Reviewed orders with patient.  Reviewed health maintenance and updated orders accordingly - { :985472::\"Yes\"}  {Chronicprobdata (optional):086632}    Breast Cancer Screening:  Any new diagnosis of family breast, ovarian, or bowel cancer? {Yes_Link to Screening / No:543851}    FHS-7: No flowsheet data found.  {If any of the questions to the BCRA (FHS-7) are answered yes, consider ordering referral for genetic counseling (Optional) :718287::\"click delete button to remove this line now\"}  {AMB Mammogram Decision Support (Optional) :567257}  Pertinent mammograms are reviewed under the imaging tab.    History of abnormal Pap smear: { :773027}  PAP / HPV Latest Ref Rng & Units 9/9/2019   PAP (Historical) - NIL   HPV16 NEG:Negative Negative   HPV18 NEG:Negative Negative   HRHPV NEG:Negative Negative " "    Reviewed and updated as needed this visit by clinical staff                    Reviewed and updated as needed this visit by Provider                   {HISTORY OPTIONS (Optional):165208}    Review of Systems  {FEMALE ROS (Optional):756767}     OBJECTIVE:   There were no vitals taken for this visit.  Physical Exam  {Exam Choices (Optional):792804}    {Diagnostic Test Results (Optional):410138::\"Diagnostic Test Results:\",\"Labs reviewed in Epic\"}    ASSESSMENT/PLAN:   {Diag Picklist:974814}    {Patient advised of split billing (Optional):779734}    COUNSELING:  {FEMALE COUNSELING MESSAGES:870635::\"Reviewed preventive health counseling, as reflected in patient instructions\"}    Estimated body mass index is 18.85 kg/m  as calculated from the following:    Height as of 3/3/22: 1.702 m (5' 7\").    Weight as of 3/3/22: 54.6 kg (120 lb 6 oz).    {Weight Management Plan (ACO) Complete if BMI is abnormal-  Ages 18-64  BMI >24.9.  Age 65+ with BMI <23 or >30 (Optional):412076}    She reports that she has been smoking cigarettes. She has been smoking about 0.50 packs per day. She has never used smokeless tobacco.  Tobacco Cessation Action Plan:   {TOBACCO CESSATION ACTION PLAN:708116}      Counseling Resources:  ATP IV Guidelines  Pooled Cohorts Equation Calculator  Breast Cancer Risk Calculator  BRCA-Related Cancer Risk Assessment: FHS-7 Tool  FRAX Risk Assessment  ICSI Preventive Guidelines  Dietary Guidelines for Americans, 2010  USDA's MyPlate  ASA Prophylaxis  Lung CA Screening    HOMA HARDING NP  [facility] LEONARDO  "

## 2022-06-14 ENCOUNTER — OFFICE VISIT (OUTPATIENT)
Dept: FAMILY MEDICINE | Facility: CLINIC | Age: 37
End: 2022-06-14
Payer: COMMERCIAL

## 2022-06-14 VITALS
HEART RATE: 114 BPM | OXYGEN SATURATION: 100 % | SYSTOLIC BLOOD PRESSURE: 119 MMHG | RESPIRATION RATE: 20 BRPM | WEIGHT: 126.4 LBS | TEMPERATURE: 98.3 F | HEIGHT: 66 IN | BODY MASS INDEX: 20.31 KG/M2 | DIASTOLIC BLOOD PRESSURE: 82 MMHG

## 2022-06-14 DIAGNOSIS — R11.0 NAUSEA: ICD-10-CM

## 2022-06-14 DIAGNOSIS — G89.29 CHRONIC BACK PAIN, UNSPECIFIED BACK LOCATION, UNSPECIFIED BACK PAIN LATERALITY: ICD-10-CM

## 2022-06-14 DIAGNOSIS — S32.000S COMPRESSION FRACTURE OF LUMBAR VERTEBRA, UNSPECIFIED LUMBAR VERTEBRAL LEVEL, SEQUELA: ICD-10-CM

## 2022-06-14 DIAGNOSIS — Z79.899 ENCOUNTER FOR LONG-TERM (CURRENT) USE OF MEDICATIONS: Primary | ICD-10-CM

## 2022-06-14 DIAGNOSIS — M54.9 CHRONIC BACK PAIN, UNSPECIFIED BACK LOCATION, UNSPECIFIED BACK PAIN LATERALITY: ICD-10-CM

## 2022-06-14 DIAGNOSIS — F43.23 ADJUSTMENT DISORDER WITH MIXED ANXIETY AND DEPRESSED MOOD: ICD-10-CM

## 2022-06-14 DIAGNOSIS — R25.1 TREMORS OF NERVOUS SYSTEM: ICD-10-CM

## 2022-06-14 PROCEDURE — 99214 OFFICE O/P EST MOD 30 MIN: CPT | Performed by: NURSE PRACTITIONER

## 2022-06-14 RX ORDER — ONDANSETRON 4 MG/1
4-8 TABLET, FILM COATED ORAL EVERY 8 HOURS PRN
Qty: 90 TABLET | Refills: 2 | Status: SHIPPED | OUTPATIENT
Start: 2022-06-14 | End: 2022-08-23

## 2022-06-14 RX ORDER — BACLOFEN 20 MG/1
TABLET ORAL
Qty: 270 TABLET | Refills: 1 | Status: SHIPPED | OUTPATIENT
Start: 2022-06-14 | End: 2022-08-23

## 2022-06-14 RX ORDER — CYCLOBENZAPRINE HCL 10 MG
10 TABLET ORAL
Qty: 90 TABLET | Refills: 1 | Status: SHIPPED | OUTPATIENT
Start: 2022-06-14 | End: 2024-02-02

## 2022-06-14 RX ORDER — BUSPIRONE HYDROCHLORIDE 5 MG/1
TABLET ORAL
Qty: 270 TABLET | Refills: 1 | Status: SHIPPED | OUTPATIENT
Start: 2022-06-14 | End: 2022-12-20

## 2022-06-14 RX ORDER — GABAPENTIN 300 MG/1
CAPSULE ORAL
Qty: 120 CAPSULE | Refills: 3 | Status: SHIPPED | OUTPATIENT
Start: 2022-06-14 | End: 2022-08-23

## 2022-06-14 RX ORDER — IBUPROFEN 800 MG/1
TABLET, FILM COATED ORAL
Qty: 270 TABLET | Refills: 1 | Status: SHIPPED | OUTPATIENT
Start: 2022-06-14 | End: 2023-10-11

## 2022-06-14 RX ORDER — VENLAFAXINE HYDROCHLORIDE 150 MG/1
300 CAPSULE, EXTENDED RELEASE ORAL DAILY
Qty: 180 CAPSULE | Refills: 1 | Status: SHIPPED | OUTPATIENT
Start: 2022-06-14 | End: 2023-01-27

## 2022-06-14 ASSESSMENT — ANXIETY QUESTIONNAIRES
2. NOT BEING ABLE TO STOP OR CONTROL WORRYING: NEARLY EVERY DAY
GAD7 TOTAL SCORE: 20
4. TROUBLE RELAXING: NEARLY EVERY DAY
2. NOT BEING ABLE TO STOP OR CONTROL WORRYING: NEARLY EVERY DAY
8. IF YOU CHECKED OFF ANY PROBLEMS, HOW DIFFICULT HAVE THESE MADE IT FOR YOU TO DO YOUR WORK, TAKE CARE OF THINGS AT HOME, OR GET ALONG WITH OTHER PEOPLE?: VERY DIFFICULT
6. BECOMING EASILY ANNOYED OR IRRITABLE: NEARLY EVERY DAY
7. FEELING AFRAID AS IF SOMETHING AWFUL MIGHT HAPPEN: NEARLY EVERY DAY
3. WORRYING TOO MUCH ABOUT DIFFERENT THINGS: NEARLY EVERY DAY
1. FEELING NERVOUS, ANXIOUS, OR ON EDGE: NEARLY EVERY DAY
5. BEING SO RESTLESS THAT IT IS HARD TO SIT STILL: MORE THAN HALF THE DAYS
7. FEELING AFRAID AS IF SOMETHING AWFUL MIGHT HAPPEN: NEARLY EVERY DAY
1. FEELING NERVOUS, ANXIOUS, OR ON EDGE: NEARLY EVERY DAY
3. WORRYING TOO MUCH ABOUT DIFFERENT THINGS: NEARLY EVERY DAY

## 2022-06-14 ASSESSMENT — ASTHMA QUESTIONNAIRES: ACT_TOTALSCORE: 25

## 2022-06-14 ASSESSMENT — PATIENT HEALTH QUESTIONNAIRE - PHQ9
SUM OF ALL RESPONSES TO PHQ QUESTIONS 1-9: 14
SUM OF ALL RESPONSES TO PHQ QUESTIONS 1-9: 14
10. IF YOU CHECKED OFF ANY PROBLEMS, HOW DIFFICULT HAVE THESE PROBLEMS MADE IT FOR YOU TO DO YOUR WORK, TAKE CARE OF THINGS AT HOME, OR GET ALONG WITH OTHER PEOPLE: VERY DIFFICULT

## 2022-06-14 ASSESSMENT — PAIN SCALES - GENERAL: PAINLEVEL: NO PAIN (0)

## 2022-06-14 NOTE — PROGRESS NOTES
Assessment & Plan     Encounter for long-term (current) use of medications      Compression fracture of lumbar vertebra, unspecified lumbar vertebral level, sequela    - baclofen (LIORESAL) 20 MG tablet; TAKE 1 TABLET BY MOUTH THREE TIMES DAILY  - gabapentin (NEURONTIN) 300 MG capsule; Take 4 capsules by mouth 3 times daily.    Chronic back pain, unspecified back location, unspecified back pain laterality    - [id number] - Urine Drug Confirmation Panel (Comprehensive); Future  - baclofen (LIORESAL) 20 MG tablet; TAKE 1 TABLET BY MOUTH THREE TIMES DAILY  - gabapentin (NEURONTIN) 300 MG capsule; Take 4 capsules by mouth 3 times daily.  - ibuprofen (ADVIL/MOTRIN) 800 MG tablet; TAKE 1 TABLET BY MOUTH EVERY 8 HOURS AS NEEDED FOR PAIN; take with food.    Adjustment disorder with mixed anxiety and depressed mood    - busPIRone (BUSPAR) 5 MG tablet; TAKE 1 TABLET(5 MG) BY MOUTH TWICE DAILY. MAY TAKE A THIRD TABLET DAILY AS NEEDED  - venlafaxine (EFFEXOR XR) 150 MG 24 hr capsule; Take 2 capsules (300 mg) by mouth daily    Tremors of nervous system    - cyclobenzaprine (FLEXERIL) 10 MG tablet; Take 1 tablet (10 mg) by mouth nightly as needed for muscle spasms    Nausea    - ondansetron (ZOFRAN) 4 MG tablet; Take 1-2 tablets (4-8 mg) by mouth every 8 hours as needed for nausea    25 minutes spent on the date of the encounter doing chart review, history and exam, documentation and further activities per the note     Depression Screening Follow Up    PHQ 6/14/2022   PHQ-9 Total Score 14   Q9: Thoughts of better off dead/self-harm past 2 weeks Not at all     Last PHQ-9 6/14/2022   1.  Little interest or pleasure in doing things 1   2.  Feeling down, depressed, or hopeless 2   3.  Trouble falling or staying asleep, or sleeping too much 2   4.  Feeling tired or having little energy 3   5.  Poor appetite or overeating 1   6.  Feeling bad about yourself 3   7.  Trouble concentrating 1   8.  Moving slowly or restless 1   Q9: Thoughts  "of better off dead/self-harm past 2 weeks 0   PHQ-9 Total Score 14   Difficulty at work, home, or with people -       Follow Up Actions Taken  Crisis resource information provided in After Visit Summary     See Patient Instructions: follow up as needed or in 3 months.     Return in about 3 months (around 9/14/2022), or if symptoms worsen or fail to improve.    HOMA HARDING, NISREEN  [facility] LEONARDO Espinosa is a 37 year old who presents for the following health issues     Was coming in for physical, but got call she needs to  sick child.  Wanting to address medication refills.  Ongoing back pain for which she uses as needed muscle relaxers, gabapentin and 800 mg ibuprofen which help.  Mental health is doing ok, would leora to try increase of effexor.  Takes zofran prn for nausea and tolerates it well.     History of Present Illness       Reason for visit:  Med refills    She eats 2-3 servings of fruits and vegetables daily.She consumes 5 sweetened beverage(s) daily.She exercises with enough effort to increase her heart rate 10 to 19 minutes per day.  She exercises with enough effort to increase her heart rate 4 days per week.   She is taking medications regularly.    Today's PHQ-9         PHQ-9 Total Score: 14    PHQ-9 Q9 Thoughts of better off dead/self-harm past 2 weeks :   Not at all    How difficult have these problems made it for you to do your work, take care of things at home, or get along with other people: Very difficult  Today's LILIANA-7 Score: 20       Review of Systems   Constitutional, HEENT, cardiovascular, pulmonary, GI, , musculoskeletal, neuro, skin, endocrine and psych systems are negative, except as otherwise noted.      Objective    /82   Pulse 114   Temp 98.3  F (36.8  C) (Tympanic)   Resp 20   Ht 1.68 m (5' 6.14\")   Wt 57.3 kg (126 lb 6.4 oz)   SpO2 100%   BMI 20.31 kg/m    Body mass index is 20.31 kg/m .  Physical Exam   GENERAL: Healthy, " alert and no distress  EYES: Eyes grossly normal to inspection.  No discharge or erythema, or obvious scleral/conjunctival abnormalities.  RESP: No audible wheeze, cough, or visible cyanosis.  No visible retractions or increased work of breathing.    SKIN: Visible skin clear. No significant rash, abnormal pigmentation or lesions.  NEURO: Cranial nerves grossly intact.  Mentation and speech appropriate for age.  PSYCH: Mentation appears normal, affect normal/bright, judgement and insight intact, normal speech and appearance well-groomed.    See orders- controlled substance agreement signed, pt left prior to UDS      Answers for HPI/ROS submitted by the patient on 6/14/2022  If you checked off any problems, how difficult have these problems made it for you to do your work, take care of things at home, or get along with other people?: Very difficult  PHQ9 TOTAL SCORE: 14  LILIANA 7 TOTAL SCORE: 20

## 2022-06-14 NOTE — LETTER
Opioid / Opioid Plus Controlled Substance Agreement    This is an agreement between you and your provider about the safe and appropriate use of controlled substance/opioids prescribed by your care team. Controlled substances are medicines that can cause physical and mental dependence (abuse).    There are strict laws about having and using these medicines. We here at [facility] are committing to working with you in your efforts to get better. To support you in this work, we ll help you schedule regular office appointments for medicine refills. If we must cancel or change your appointment for any reason, we ll make sure you have enough medicine to last until your next appointment.     As a Provider, I will:    Listen carefully to your concerns and treat you with respect.     Recommend a treatment plan that I believe is in your best interest. This plan may involve therapies other than opioid pain medication.     Talk with you often about the possible benefits, and the risk of harm of any medicine that we prescribe for you.     Provide a plan on how to taper (discontinue or go off) using this medicine if the decision is made to stop its use.    As a Patient, I understand that opioid(s):     Are a controlled substance prescribed by my care team to help me function or work and manage my condition(s).     Are strong medicines and can cause serious side effects such as:    Drowsiness, which can seriously affect my driving ability    A lower breathing rate, enough to cause death    Harm to my thinking ability     Depression     Abuse of and addiction to this medicine    Need to be taken exactly as prescribed. Combining opioids with certain medicines or chemicals (such as illegal drugs, sedatives, sleeping pills, and benzodiazepines) can be dangerous or even fatal. If I stop opioids suddenly, I may have severe withdrawal symptoms.    Do not work for all types of pain nor for all patients. If they re not helpful, I may  be asked to stop them.    I am also being prescribed a benzodiazepine (tranquilizer) controlled substance: I understand this type of medicine is sedating and can increase the risk of death when taken together with opioids. I have talked to my care team about having prescription Narcan available for reversing the opioid medicine and have been instructed in its use. I will be very careful to take my medicines only as directed  I am also being prescribed a stimulant controlled substance to help manage symptoms of attention deficit, appetite suppression, and/or fatigue.    The risks, benefits and side effects of these medicine(s) were explained to me. I agree that:  1. I will take part in other treatments as advised by my care team. This may be psychiatry or counseling, physical therapy, behavioral therapy, group treatment or a referral to a specialist.     2. I will keep all my appointments. I understand that this is part of the monitoring of opioids. My care team may require an office visit for EVERY opioid/controlled substance refill. If I miss appointments or don t follow instructions, my care team may stop my medicine.    3. I will take my medicines as prescribed. I will not change the dose or schedule unless my care team tells me to. There will be no refills if I run out early.     4. I may be asked to come to the clinic and complete a urine drug test or complete a pill count at any time. If I don t give a urine sample or participate in a pill count, the care team may stop my medicine.    5. I will only receive prescriptions from this clinic for chronic pain. If I am treated by another provider for acute pain issues, I will tell them that I am taking opioid pain medication for chronic pain and that I have a treatment agreement with this provider. I will inform my [facility] care team within one business day if I am given a prescription for any pain medication by another healthcare provider. My [facility]  [place] care team can contact other providers and pharmacists about my use of any medicines.    6. It is up to me to make sure that I don t run out of my medicines on weekends or holidays. If my care team is willing to refill my opioid prescription without a visit, I must request refills only during office hours. Refills may take up to 3 business days to process. I will use one pharmacy to fill all my opioid and other controlled substance prescriptions. I will notify the clinic about any changes to my insurance or medication availability.    7. I am responsible for my prescriptions. If the medicine/prescription is lost, stolen or destroyed, it will not be replaced. I also agree not to share controlled substance medicines with anyone.    8. I am aware I should not use any illegal or recreational drugs. I agree not to drink alcohol unless my care team says I can.       9. If I enroll in the [place] Medical Cannabis program, I will tell my care team prior to my next refill.     10. I will tell my care team right away if I become pregnant, have a new medical problem treated outside of my regular clinic, or have a change in my medications.    11. I understand that this medicine can affect my thinking, judgment and reaction time. Alcohol and drugs affect the brain and body, which can affect the safety of my driving. Being under the influence of alcohol or drugs can affect my decision-making, behaviors, personal safety, and the safety of others. Driving while impaired (DWI) can occur if a person is driving, operating, or in physical control of a car, motorcycle, boat, snowmobile, ATV, motorbike, off-road vehicle, or any other motor vehicle ([place] Statute 169A.20). I understand the risk if I choose to drive or operate any vehicle or machinery.    I understand that if I do not follow any of the conditions above, my prescriptions or treatment may be stopped or changed.          Opioids  What You Need to Know    What are  opioids?   Opioids are pain medicines that must be prescribed by a doctor. They are also known as narcotics.     Examples are:   1. morphine (MS Contin, Pili)  2. oxycodone (Oxycontin)  3. oxycodone and acetaminophen (Percocet)  4. hydrocodone and acetaminophen (Vicodin, Norco)   5. fentanyl patch (Duragesic)   6. hydromorphone (Dilaudid)   7. methadone  8. codeine (Tylenol #3)     What do opioids do well?   Opioids are best for severe short-term pain such as after a surgery or injury. They may work well for cancer pain. They may help some people with long-lasting (chronic) pain.     What do opioids NOT do well?   Opioids never get rid of pain entirely, and they don t work well for most patients with chronic pain. Opioids don t reduce swelling, one of the causes of pain.                                    Other ways to manage chronic pain and improve function include:       Treat the health problem that may be causing pain    Anti-inflammation medicines, which reduce swelling and tenderness, such as ibuprofen (Advil, Motrin) or naproxen (Aleve)    Acetaminophen (Tylenol)    Antidepressants and anti-seizure medicines, especially for nerve pain    Topical treatments such as patches or creams    Injections or nerve blocks    Chiropractic or osteopathic treatment    Acupuncture, massage, deep breathing, meditation, visual imagery, aromatherapy    Use heat or ice at the pain site    Physical therapy     Exercise    Stop smoking    Take part in therapy       Risks and side effects     Talk to your doctor before you start or decide to keep taking opioids. Possible side effects include:      Lowering your breathing rate enough to cause death    Overdose, including death, especially if taking higher than prescribed doses    Worse depression symptoms; less pleasure in things you usually enjoy    Feeling tired or sluggish    Slower thoughts or cloudy thinking    Being more sensitive to pain over time; pain is harder to  control    Trouble sleeping or restless sleep    Changes in hormone levels (for example, less testosterone)    Changes in sex drive or ability to have sex    Constipation    Unsafe driving    Itching and sweating    Dizziness    Nausea, throwing up and dry mouth    What else should I know about opioids?    Opioids may lead to dependence, tolerance, or addiction.      Dependence means that if you stop or reduce the medicine too quickly, you will have withdrawal symptoms. These include loose poop (diarrhea), jitters, flu-like symptoms, nervousness and tremors. Dependence is not the same as addiction.                       Tolerance means needing higher doses over time to get the same effect. This may increase the chance of serious side effects.      Addiction is when people improperly use a substance that harms their body, their mind or their relations with others. Use of opiates can cause a relapse of addiction if you have a history of drug or alcohol abuse.      People who have used opioids for a long time may have a lower quality of life, worse depression, higher levels of pain and more visits to doctors.    You can overdose on opioids. Take these steps to lower your risk of overdose:    1. Recognize the signs:  Signs of overdose include decrease or loss of consciousness (blackout), slowed breathing, trouble waking up and blue lips. If someone is worried about overdose, they should call 911.    2. Talk to your doctor about Narcan (naloxone).   If you are at risk for overdose, you may be given a prescription for Narcan. This medicine very quickly reverses the effects of opioids.   If you overdose, a friend or family member can give you Narcan while waiting for the ambulance. They need to know the signs of overdose and how to give Narcan.     3. Don't use alcohol or street drugs.   Taking them with opioids can cause death.    4. Do not take any of these medicines unless your doctor says it s OK. Taking these with  opioids can cause death:    Benzodiazepines, such as lorazepam (Ativan), alprazolam (Xanax) or diazepam (Valium)    Muscle relaxers, such as cyclobenzaprine (Flexeril)    Sleeping pills like zolpidem (Ambien)     Other opioids      How to keep you and other people safe while taking opioids:    1. Never share your opioids with others.  Opioid medicines are regulated by the Drug Enforcement Agency (TORRI[employer]). Selling or sharing medications is a criminal act.    2. Be sure to store opioids in a secure place, locked up if possible. Young children can easily swallow them and overdose.    3. When you are traveling with your medicines, keep them in the original bottles. If you use a pill box, be sure you also carry a copy of your medicine list from your clinic or pharmacy.    4. Safe disposal of opioids    Most pharmacies have places to get rid of medicine, called disposal kiosks. Medicine disposal options are also available in every [facility]. Search your county and  medication disposal  to find more options. You can find more details at:  https://www.pca.[facility].[place]./living-green/managing-unwanted-medications     I agree that my provider, clinic care team, and pharmacy may work with any city, state or federal law enforcement agency that investigates the misuse, sale, or other diversion of my controlled medicine. I will allow my provider to discuss my care with, or share a copy of, this agreement with any other treating provider, pharmacy or emergency room where I receive care.    I have read this agreement and have asked questions about anything I did not understand.    _______________________________________________________  Patient Signature - Francisca Reed _____________________                   Date     _______________________________________________________  Provider Signature - HOMA HARDING NP   _____________________                   Date      _______________________________________________________  Witness Signature (required if provider not present while patient signing)   _____________________                   Date

## 2022-06-14 NOTE — LETTER
My Asthma Action Plan    Name: Francisca Reed   YOB: 1985  Date: 6/14/2022   My doctor: HOMA HARDING NP   My clinic: [facility]        My Rescue Medicine:   Albuterol inhaler (Proair/Ventolin/Proventil HFA)  2-4 puffs EVERY 4 HOURS as needed. Use a spacer if recommended by your provider.   My Asthma Severity:   Intermittent / Exercise Induced  Know your asthma triggers:              GREEN ZONE   Good Control    I feel good    No cough or wheeze    Can work, sleep and play without asthma symptoms       Take your asthma control medicine every day.     1. If exercise triggers your asthma, take your rescue medication    15 minutes before exercise or sports, and    During exercise if you have asthma symptoms  2. Spacer to use with inhaler: If you have a spacer, make sure to use it with your inhaler             YELLOW ZONE Getting Worse  I have ANY of these:    I do not feel good    Cough or wheeze    Chest feels tight    Wake up at night   1. Keep taking your Green Zone medications  2. Start taking your rescue medicine:    every 20 minutes for up to 1 hour. Then every 4 hours for 24-48 hours.  3. If you stay in the Yellow Zone for more than 12-24 hours, contact your doctor.  4. If you do not return to the Green Zone in 12-24 hours or you get worse, start taking your oral steroid medicine if prescribed by your provider.           RED ZONE Medical Alert - Get Help  I have ANY of these:    I feel awful    Medicine is not helping    Breathing getting harder    Trouble walking or talking    Nose opens wide to breathe       1. Take your rescue medicine NOW  2. If your provider has prescribed an oral steroid medicine, start taking it NOW  3. Call your doctor NOW  4. If you are still in the Red Zone after 20 minutes and you have not reached your doctor:    Take your rescue medicine again and    Call 911 or go to the emergency room right away    See your regular doctor within 2  weeks of an Emergency Room or Urgent Care visit for follow-up treatment.          Annual Reminders:  Meet with Asthma Educator,  Flu Shot in the Fall, consider Pneumonia Vaccination for patients with asthma (aged 19 and older).    Pharmacy: [id number] - [place] [address] [facility] [address]    Electronically signed by HOMA HARDING NP   Date: 06/14/22                    Asthma Triggers  How To Control Things That Make Your Asthma Worse    Triggers are things that make your asthma worse.  Look at the list below to help you find your triggers and   what you can do about them. You can help prevent asthma flare-ups by staying away from your triggers.      Trigger                                                          What you can do   Cigarette Smoke  Tobacco smoke can make asthma worse. Do not allow smoking in your home, car or around you.  Be sure no one smokes at a child s day care or school.  If you smoke, ask your health care provider for ways to help you quit.  Ask family members to quit too.  Ask your health care provider for a referral to Quit Plan to help you quit smoking, or call 1-088-025-PLAN.     Colds, Flu, Bronchitis  These are common triggers of asthma. Wash your hands often.  Don t touch your eyes, nose or mouth.  Get a flu shot every year.     Dust Mites  These are tiny bugs that live in cloth or carpet. They are too small to see. Wash sheets and blankets in hot water every week.   Encase pillows and mattress in dust mite proof covers.  Avoid having carpet if you can. If you have carpet, vacuum weekly.   Use a dust mask and HEPA vacuum.   Pollen and Outdoor Mold  Some people are allergic to trees, grass, or weed pollen, or molds. Try to keep your windows closed.  Limit time out doors when pollen count is high.   Ask you health care provider about taking medicine during allergy season.     Animal Dander  Some people are allergic to skin flakes,  urine or saliva from pets with fur or feathers. Keep pets with fur or feathers out of your home.    If you can t keep the pet outdoors, then keep the pet out of your bedroom.  Keep the bedroom door closed.  Keep pets off cloth furniture and away from stuffed toys.     Mice, Rats, and Cockroaches  Some people are allergic to the waste from these pests.   Cover food and garbage.  Clean up spills and food crumbs.  Store grease in the refrigerator.   Keep food out of the bedroom.   Indoor Mold  This can be a trigger if your home has high moisture. Fix leaking faucets, pipes, or other sources of water.   Clean moldy surfaces.  Dehumidify basement if it is damp and smelly.   Smoke, Strong Odors, and Sprays  These can reduce air quality. Stay away from strong odors and sprays, such as perfume, powder, hair spray, paints, smoke incense, paint, cleaning products, candles and new carpet.   Exercise or Sports  Some people with asthma have this trigger. Be active!  Ask your doctor about taking medicine before sports or exercise to prevent symptoms.    Warm up for 5-10 minutes before and after sports or exercise.     Other Triggers of Asthma  Cold air:  Cover your nose and mouth with a scarf.  Sometimes laughing or crying can be a trigger.  Some medicines and food can trigger asthma.

## 2022-08-15 ENCOUNTER — TELEPHONE (OUTPATIENT)
Dept: FAMILY MEDICINE | Facility: CLINIC | Age: 37
End: 2022-08-15

## 2022-08-22 ENCOUNTER — TELEPHONE (OUTPATIENT)
Dept: NURSING | Facility: CLINIC | Age: 37
End: 2022-08-22

## 2022-08-22 DIAGNOSIS — R11.0 NAUSEA: ICD-10-CM

## 2022-08-22 DIAGNOSIS — J30.1 ALLERGIC RHINITIS DUE TO POLLEN, UNSPECIFIED SEASONALITY: Primary | ICD-10-CM

## 2022-08-22 DIAGNOSIS — S32.000S COMPRESSION FRACTURE OF LUMBAR VERTEBRA, UNSPECIFIED LUMBAR VERTEBRAL LEVEL, SEQUELA: ICD-10-CM

## 2022-08-22 DIAGNOSIS — G89.29 CHRONIC BACK PAIN, UNSPECIFIED BACK LOCATION, UNSPECIFIED BACK PAIN LATERALITY: ICD-10-CM

## 2022-08-22 DIAGNOSIS — M54.9 CHRONIC BACK PAIN, UNSPECIFIED BACK LOCATION, UNSPECIFIED BACK PAIN LATERALITY: ICD-10-CM

## 2022-08-22 NOTE — TELEPHONE ENCOUNTER
Pt is calling in from a treatment center. Pt needs a note with signed orders, and prescriptions for her Baclofen, Gabapentin, Claritin, and Zofran faxed to this fax number so that this patient can take her current medications she has with her at the treatment facility.     Please fax this order and prescriptions to [phone].     Please call nursing staff at the facility  [phone] if you have questions.     Oseas Herring RN on 8/22/2022 at 2:22 PM

## 2022-08-22 NOTE — TELEPHONE ENCOUNTER
"Patient was seen 6/14/22 by Marielle Thompson.    Do they just need signed med list or do they need prescriptions sent to a pharmacy?  What is the name of the facility?    I called [phone], no answer, called rolled to \"main nursing\" voicemail.        Left message requesting call back to [facility] nurse line at [phone].    Henrietta Quinonez RN  [facility]              "

## 2022-08-22 NOTE — TELEPHONE ENCOUNTER
What is she is treatment for?  Are they not wanting her on these medications while she is there? WILDER Peña, FNP-BC

## 2022-08-23 NOTE — TELEPHONE ENCOUNTER
I have the four meds ready to send to they want them sent to a specific pharmacy? I am not changing baclofen. WILDER Peña, FNP-BC

## 2022-08-23 NOTE — TELEPHONE ENCOUNTER
Caitlin from treatment center returned call.  Patient is in the treatment center for methamphetamine use.  Patient is allowed to have med's at facility, they just need orders for the four meds listed.  Baclofen- prison had patient on BID but patient wants it to be TID and PRN.  Gabapentin they have down for 300 mg 3 capsules TID, our med list Gabapentin 300 mg take 4 capsules by mouth 3 times daily, claritin and zofran they have no orders.  Please advise    Baclofen-  Was in prison and was on BID.  Patient wants it to be TID and prn  Gabapentin they have for 300 mg 3 capsules TID  Need orders for claritin and zofran

## 2022-08-23 NOTE — TELEPHONE ENCOUNTER
Called number below for treatment facility.  The voicemail stated main nursing line.  Did they answer the phone: No, left a message on voicemail to return call to the [facility] at [phone].    Nirali RN,BSN  Triage Nurse  [facility]: [facility]

## 2022-08-23 NOTE — TELEPHONE ENCOUNTER
"I called [phone] for the treatment center.   Again, no answer, call went to \"main nursing\" voicemail.    Left message for call back to [facility], [phone].    Do they want prescriptions sent to a pharmacy to be filled?   If so, what pharmacy?  Or do they want signed paper Rx's faxed to facility?      If not, do they just need a signed medication list faxed?    What is the name of the facility?    Henrietta Quinonez RN  [facility]      "

## 2022-08-25 RX ORDER — GABAPENTIN 300 MG/1
900 CAPSULE ORAL 4 TIMES DAILY
Qty: 1080 CAPSULE | Refills: 0 | Status: SHIPPED | OUTPATIENT
Start: 2022-08-25 | End: 2023-02-06

## 2022-08-25 RX ORDER — BACLOFEN 20 MG/1
TABLET ORAL
Qty: 270 TABLET | Refills: 0 | Status: SHIPPED | OUTPATIENT
Start: 2022-08-25 | End: 2023-10-11

## 2022-08-25 RX ORDER — LORATADINE 10 MG/1
10 TABLET ORAL DAILY
Qty: 90 TABLET | Refills: 0 | Status: SHIPPED | OUTPATIENT
Start: 2022-08-25

## 2022-08-25 RX ORDER — ONDANSETRON 4 MG/1
4-8 TABLET, FILM COATED ORAL EVERY 8 HOURS PRN
Qty: 90 TABLET | Refills: 0 | Status: SHIPPED | OUTPATIENT
Start: 2022-08-25 | End: 2023-10-11

## 2022-08-25 NOTE — TELEPHONE ENCOUNTER
Called treatment facility and spoke with Caitlin. Preferred pharmacy cued below.     FYI-The cued gabapentin does not match medication list.  Caitlin said patient was taking gabapentin 300 mg 4 times daily. She ask that this be brought to the providers attention but they will dispense whatever is chosen to be sent in.     Thank you,  Thea Wilkinson RN

## 2022-09-03 ENCOUNTER — HEALTH MAINTENANCE LETTER (OUTPATIENT)
Age: 37
End: 2022-09-03

## 2023-01-09 DIAGNOSIS — F43.23 ADJUSTMENT DISORDER WITH MIXED ANXIETY AND DEPRESSED MOOD: ICD-10-CM

## 2023-01-10 RX ORDER — BUSPIRONE HYDROCHLORIDE 5 MG/1
TABLET ORAL
Qty: 270 TABLET | Refills: 0 | OUTPATIENT
Start: 2023-01-10

## 2023-01-23 DIAGNOSIS — F43.23 ADJUSTMENT DISORDER WITH MIXED ANXIETY AND DEPRESSED MOOD: ICD-10-CM

## 2023-01-23 DIAGNOSIS — J45.20 MILD INTERMITTENT ASTHMA WITHOUT COMPLICATION: ICD-10-CM

## 2023-01-23 RX ORDER — ALBUTEROL SULFATE 90 UG/1
1-2 AEROSOL, METERED RESPIRATORY (INHALATION) EVERY 4 HOURS PRN
Qty: 18 G | Refills: 1 | Status: SHIPPED | OUTPATIENT
Start: 2023-01-23 | End: 2023-10-11

## 2023-01-23 RX ORDER — BUSPIRONE HYDROCHLORIDE 5 MG/1
TABLET ORAL
Qty: 270 TABLET | Refills: 0 | OUTPATIENT
Start: 2023-01-23

## 2023-01-27 DIAGNOSIS — F43.23 ADJUSTMENT DISORDER WITH MIXED ANXIETY AND DEPRESSED MOOD: ICD-10-CM

## 2023-01-27 RX ORDER — VENLAFAXINE HYDROCHLORIDE 150 MG/1
300 CAPSULE, EXTENDED RELEASE ORAL DAILY
Qty: 180 CAPSULE | Refills: 0 | Status: SHIPPED | OUTPATIENT
Start: 2023-01-27 | End: 2024-02-02

## 2023-02-06 DIAGNOSIS — G89.29 CHRONIC BACK PAIN, UNSPECIFIED BACK LOCATION, UNSPECIFIED BACK PAIN LATERALITY: ICD-10-CM

## 2023-02-06 DIAGNOSIS — M54.9 CHRONIC BACK PAIN, UNSPECIFIED BACK LOCATION, UNSPECIFIED BACK PAIN LATERALITY: ICD-10-CM

## 2023-02-06 DIAGNOSIS — S32.000S COMPRESSION FRACTURE OF LUMBAR VERTEBRA, UNSPECIFIED LUMBAR VERTEBRAL LEVEL, SEQUELA: ICD-10-CM

## 2023-02-06 RX ORDER — GABAPENTIN 300 MG/1
900 CAPSULE ORAL 4 TIMES DAILY
Qty: 1080 CAPSULE | Refills: 0 | Status: SHIPPED | OUTPATIENT
Start: 2023-02-06 | End: 2023-10-11

## 2023-06-02 ENCOUNTER — HEALTH MAINTENANCE LETTER (OUTPATIENT)
Age: 38
End: 2023-06-02

## 2023-08-18 NOTE — TELEPHONE ENCOUNTER
I spoke with pharmacist at [facility] [address]. They are not sure which medication patient is asking about. They have not lost any prescriptions.     Called patient, unable to reach at number listed.     Ivana Duggan RN BSN    
Patient has appt today with provider.  Darshana Guerrero RN    
Reason for call:  Other   Patient called regarding (reason for call): call back  Additional comments: Patient is calling regarding a medication that the pharmacy has lost. She is wondering if the doctor can resend it. Please call back to discuss.    Phone number to reach patient:  Home number on file [phone] (home)    Best Time:  Any     Can we leave a detailed message on this number?  YES    Travel screening: Negative    
25

## 2023-10-11 ENCOUNTER — VIRTUAL VISIT (OUTPATIENT)
Dept: FAMILY MEDICINE | Facility: CLINIC | Age: 38
End: 2023-10-11
Payer: COMMERCIAL

## 2023-10-11 DIAGNOSIS — F43.23 ADJUSTMENT DISORDER WITH MIXED ANXIETY AND DEPRESSED MOOD: ICD-10-CM

## 2023-10-11 DIAGNOSIS — F33.2 SEVERE EPISODE OF RECURRENT MAJOR DEPRESSIVE DISORDER, WITHOUT PSYCHOTIC FEATURES (H): Primary | ICD-10-CM

## 2023-10-11 DIAGNOSIS — M54.9 CHRONIC BACK PAIN, UNSPECIFIED BACK LOCATION, UNSPECIFIED BACK PAIN LATERALITY: ICD-10-CM

## 2023-10-11 DIAGNOSIS — J45.20 MILD INTERMITTENT ASTHMA WITHOUT COMPLICATION: ICD-10-CM

## 2023-10-11 DIAGNOSIS — R11.0 NAUSEA: ICD-10-CM

## 2023-10-11 DIAGNOSIS — S32.000S COMPRESSION FRACTURE OF LUMBAR VERTEBRA, UNSPECIFIED LUMBAR VERTEBRAL LEVEL, SEQUELA: ICD-10-CM

## 2023-10-11 DIAGNOSIS — G89.29 CHRONIC BACK PAIN, UNSPECIFIED BACK LOCATION, UNSPECIFIED BACK PAIN LATERALITY: ICD-10-CM

## 2023-10-11 PROCEDURE — 99215 OFFICE O/P EST HI 40 MIN: CPT | Mod: VID | Performed by: INTERNAL MEDICINE

## 2023-10-11 RX ORDER — BACLOFEN 20 MG/1
TABLET ORAL
Qty: 90 TABLET | Refills: 0 | Status: SHIPPED | OUTPATIENT
Start: 2023-10-11 | End: 2024-02-02

## 2023-10-11 RX ORDER — BUSPIRONE HYDROCHLORIDE 5 MG/1
TABLET ORAL
Qty: 90 TABLET | Refills: 0 | Status: SHIPPED | OUTPATIENT
Start: 2023-10-11 | End: 2024-02-02

## 2023-10-11 RX ORDER — ALBUTEROL SULFATE 90 UG/1
1-2 AEROSOL, METERED RESPIRATORY (INHALATION) EVERY 4 HOURS PRN
Qty: 18 G | Refills: 1 | Status: SHIPPED | OUTPATIENT
Start: 2023-10-11

## 2023-10-11 RX ORDER — FLUTICASONE PROPIONATE 110 UG/1
1 AEROSOL, METERED RESPIRATORY (INHALATION) 2 TIMES DAILY
Qty: 12 G | Refills: 1 | Status: SHIPPED | OUTPATIENT
Start: 2023-10-11 | End: 2024-02-02

## 2023-10-11 RX ORDER — ONDANSETRON 4 MG/1
4 TABLET, FILM COATED ORAL EVERY 8 HOURS PRN
Qty: 30 TABLET | Refills: 3 | Status: SHIPPED | OUTPATIENT
Start: 2023-10-11

## 2023-10-11 RX ORDER — VENLAFAXINE HYDROCHLORIDE 150 MG/1
300 CAPSULE, EXTENDED RELEASE ORAL DAILY
Qty: 180 CAPSULE | Refills: 0 | Status: CANCELLED | OUTPATIENT
Start: 2023-10-11

## 2023-10-11 RX ORDER — IBUPROFEN 800 MG/1
TABLET, FILM COATED ORAL
Qty: 90 TABLET | Refills: 0 | Status: SHIPPED | OUTPATIENT
Start: 2023-10-11

## 2023-10-11 RX ORDER — VENLAFAXINE HYDROCHLORIDE 150 MG/1
150 CAPSULE, EXTENDED RELEASE ORAL DAILY
Qty: 90 CAPSULE | Refills: 1 | Status: SHIPPED | OUTPATIENT
Start: 2023-10-11 | End: 2024-02-02

## 2023-10-11 RX ORDER — GABAPENTIN 300 MG/1
300 CAPSULE ORAL 3 TIMES DAILY
Qty: 90 CAPSULE | Refills: 0 | Status: SHIPPED | OUTPATIENT
Start: 2023-10-11 | End: 2024-02-02

## 2023-10-11 ASSESSMENT — ANXIETY QUESTIONNAIRES
GAD7 TOTAL SCORE: 21
4. TROUBLE RELAXING: NEARLY EVERY DAY
6. BECOMING EASILY ANNOYED OR IRRITABLE: NEARLY EVERY DAY
3. WORRYING TOO MUCH ABOUT DIFFERENT THINGS: NEARLY EVERY DAY
5. BEING SO RESTLESS THAT IT IS HARD TO SIT STILL: NEARLY EVERY DAY
1. FEELING NERVOUS, ANXIOUS, OR ON EDGE: NEARLY EVERY DAY
2. NOT BEING ABLE TO STOP OR CONTROL WORRYING: NEARLY EVERY DAY
7. FEELING AFRAID AS IF SOMETHING AWFUL MIGHT HAPPEN: NEARLY EVERY DAY
IF YOU CHECKED OFF ANY PROBLEMS ON THIS QUESTIONNAIRE, HOW DIFFICULT HAVE THESE PROBLEMS MADE IT FOR YOU TO DO YOUR WORK, TAKE CARE OF THINGS AT HOME, OR GET ALONG WITH OTHER PEOPLE: VERY DIFFICULT
GAD7 TOTAL SCORE: 21

## 2023-10-11 ASSESSMENT — PATIENT HEALTH QUESTIONNAIRE - PHQ9
SUM OF ALL RESPONSES TO PHQ QUESTIONS 1-9: 24
10. IF YOU CHECKED OFF ANY PROBLEMS, HOW DIFFICULT HAVE THESE PROBLEMS MADE IT FOR YOU TO DO YOUR WORK, TAKE CARE OF THINGS AT HOME, OR GET ALONG WITH OTHER PEOPLE: EXTREMELY DIFFICULT

## 2023-10-11 ASSESSMENT — ASTHMA QUESTIONNAIRES: ACT_TOTALSCORE: 20

## 2023-10-11 NOTE — PATIENT INSTRUCTIONS
As discussed , I went ahead and filled in the requested medications . Please be informed as explained to you that you will need Urine drug screen on the pain medications of such huge dose and number of 1080 gabapentin tabs you are requesting.     Will be able to give 1 month supplies and will need baseline labs with your PCP in Annual physical who can refill you further.     - Sent in [employer] link to you which I suggest you to please block your date now through [employer] for scheduling Annual physical - which you can do yourself clicking this link >> as we are being booked out atleast 2 months before, thank you for understanding.      Safety plan reviewed. To the Emergency Department as needed or call after hours crisis line at [phone] or [phone]. [place] Crisis Text Line: Text [place] to 297567  or  Suicide LifeLine Chat: suicideExecutive Caddie.org/chat/  Follow up with primary care provider as planned or for acute medical concerns.        Crisis Resources:    National Suicide Prevention Lifeline: 182.335.4057 (TTY: 394.225.9328). Call anytime for help.  (www.suicidepreventionlifeline.org)  National [place] [employer] [place]ness (www.[place].org): 834.423.7266 or 569-078-7929.   Mental Health Association (www.mentalhealth.org): 819.158.7690 or 397-628-1621.  [place] Crisis Text Line: Text [place] to 662911  Suicide LifeLine Chat: suicideChi2gelline.org/chat

## 2023-10-11 NOTE — PROGRESS NOTES
Francisca is a 38 year old who is being evaluated via a billable video visit.      How would you like to obtain your AVS? Mail a copy  If the video visit is dropped, the invitation should be resent by: Text to cell phone: [phone]  Will anyone else be joining your video visit? No      Assessment and Plan    1. Severe episode of recurrent major depressive disorder, without psychotic features (H)  Uncontrolled depression and anxiety as per the scores mentioned by the patient, denies any SI/HI.  Most likely patient has been noncompliant with medications as she states since past 1 month.  We will not be able to restart on such a high dose and high numbers of capsules which patient is asking, patient understood and agreed with the plan after mentioning the below dosages and number of capsules which I can be able to give at this time.  - Safety plan discussed and mentioned in the AVS below.  -Future refills only after she establishes care with new PCP with baseline lab check and physical exam done which patient understands and agreed with the plan.  - venlafaxine (EFFEXOR XR) 150 MG 24 hr capsule; Take 1 capsule (150 mg) by mouth daily  Dispense: 90 capsule; Refill: 1  - Adult Mental Health  Referral; Future    2. Chronic back pain, unspecified back location, unspecified back pain laterality  - gabapentin (NEURONTIN) 300 MG capsule; Take 1 capsule (300 mg) by mouth 3 times daily Due for follow up  Dispense: 90 capsule; Refill: 0  - ibuprofen (ADVIL/MOTRIN) 800 MG tablet; TAKE 1 TABLET BY MOUTH EVERY 8 HOURS AS NEEDED FOR PAIN; take with food.  Dispense: 90 tablet; Refill: 0  - baclofen (LIORESAL) 20 MG tablet; TAKE 1 TABLET BY MOUTH THREE TIMES DAILY; not increasing dose.  Dispense: 90 tablet; Refill: 0    3. Compression fracture of lumbar vertebra, unspecified lumbar vertebral level, sequela  - gabapentin (NEURONTIN) 300 MG capsule; Take 1 capsule (300 mg) by mouth 3 times daily Due for follow up  Dispense: 90  capsule; Refill: 0  - baclofen (LIORESAL) 20 MG tablet; TAKE 1 TABLET BY MOUTH THREE TIMES DAILY; not increasing dose.  Dispense: 90 tablet; Refill: 0    4. Adjustment disorder with mixed anxiety and depressed mood  - busPIRone (BUSPAR) 5 MG tablet; TAKE 1 TABLET(5 MG) BY MOUTH TWICE DAILY. MAY TAKE A THIRD TABLET DAILY AS NEEDED; due for follow up  Dispense: 90 tablet; Refill: 0    5. Nausea  - ondansetron (ZOFRAN) 4 MG tablet; Take 1 tablet (4 mg) by mouth every 8 hours as needed for nausea  Dispense: 30 tablet; Refill: 3    6. Mild intermittent asthma without complication  - albuterol (PROAIR HFA/PROVENTIL HFA/VENTOLIN HFA) 108 (90 Base) MCG/ACT inhaler; Inhale 1-2 puffs into the lungs every 4 hours as needed for shortness of breath or wheezing  Dispense: 18 g; Refill: 1  - fluticasone (FLOVENT HFA) 110 MCG/ACT inhaler; Inhale 1 puff into the lungs 2 times daily  Dispense: 12 g; Refill: 1       Over 40 minutes spent on reviewing patient chart,  face to face encounter, greater than 50% time spent with plan/cordination of care and documentation as above in my A/P.          Please note that this note consists of symbols derived from keyboarding, dictation and/or voice recognition software. As a result, there may be errors in the script that have gone undetected. Please consider this when interpreting information found in this chart.    Patient Instructions   As discussed , I went ahead and filled in the requested medications . Please be informed as explained to you that you will need Urine drug screen on the pain medications of such huge dose and number of 1080 gabapentin tabs you are requesting.     Will be able to give 1 month supplies and will need baseline labs with your PCP in Annual physical who can refill you further.     - Sent in [employer] link to you which I suggest you to please block your date now through [employer] for scheduling Annual physical - which you can do yourself clicking this link >> as we are  being booked out atleast 2 months before, thank you for understanding.      Safety plan reviewed. To the Emergency Department as needed or call after hours crisis line at [phone] or [phone]. [place] Crisis Text Line: Text [place] to 235191  or  Suicide LifeLine Chat: suicide250ok.org/chat/  Follow up with primary care provider as planned or for acute medical concerns.        Crisis Resources:    National Suicide Prevention Lifeline: 258.419.5395 (TTY: 550.401.6186). Call anytime for help.  (www.suicidepreventionlifeline.org)  National [place] [employer] Mental [employer]ness (www.[place].org): 545.476.9999 or 158-576-0101.   Mental Health Association (www.mentalhealth.org): 999.477.9607 or 395-643-6373.  [place] Crisis Text Line: Text [place] to 361102  Suicide LifeLine Chat: suicide250ok.org/chat   Return in about 4 weeks (around 11/8/2023), or if symptoms worsen or fail to improve, for Preventative Visit , establish care with new PCP - for further refills of medications.    Qiana Poole MD  [facility] LEV Espinosa is a 38 year old, presenting for the following health issues:  Refill Request (Med refill on all meds/)         No data to display                History of Present Illness       Reason for visit:  Med fill    She eats 0-1 servings of fruits and vegetables daily.She consumes 0 sweetened beverage(s) daily.She exercises with enough effort to increase her heart rate 9 or less minutes per day.  She exercises with enough effort to increase her heart rate 3 or less days per week.   She is taking medications regularly.       Pt is new to me, last seen [facility] in 6/2022. She is here for medication refills. No labs done after 2020.    Uncontrolled depression PHQ-9 score of 24 , LILIANA 21 . Pt states that she is out of her anxiety and depression medication.  Asking for 1080 capsules of - gabapentin 300 mg which I explained the [facility] regulations of  controlled substance act and please see assessment plan as above. Denies SI/HI     Review of Systems   Constitutional, HEENT, cardiovascular, pulmonary, GI, , musculoskeletal, neuro, skin, endocrine and psych systems are negative, except as otherwise noted.      Objective           Vitals:  No vitals were obtained today due to virtual visit.    Physical Exam   GENERAL: Healthy, alert and no distress  EYES: Eyes grossly normal to inspection.  No discharge or erythema, or obvious scleral/conjunctival abnormalities.  RESP: No audible wheeze, cough, or visible cyanosis.  No visible retractions or increased work of breathing.    SKIN: Visible skin clear. No significant rash, abnormal pigmentation or lesions.  NEURO: Cranial nerves grossly intact.  Mentation and speech appropriate for age.  PSYCH: Mentation appears normal, affect normal/bright, judgement and insight intact, normal speech and appearance well-groomed.      Video-Visit Details    Type of service:  Video Visit   Originating Location (pt. Location): Home    Distant Location (provider location):  On-site  Platform used for Video Visit: [employer]

## 2023-10-12 DIAGNOSIS — J45.20 MILD INTERMITTENT ASTHMA WITHOUT COMPLICATION: ICD-10-CM

## 2023-10-12 RX ORDER — FLUTICASONE PROPIONATE 110 UG/1
1 AEROSOL, METERED RESPIRATORY (INHALATION) 2 TIMES DAILY
Qty: 12 G | Refills: 1 | OUTPATIENT
Start: 2023-10-12

## 2023-11-02 NOTE — PATIENT INSTRUCTIONS
Detail Level: Detailed Reminders:     Please remember to arrive 5-10 minutes early for your appointments. If you are late you may need to reschedule your appointment.    If you have mychart please be aware your results and communications will be sent within your mychart. Unless results are critical and/or urgent value.        Size Of Lesion: 3mm

## 2023-11-07 DIAGNOSIS — S32.000S COMPRESSION FRACTURE OF LUMBAR VERTEBRA, UNSPECIFIED LUMBAR VERTEBRAL LEVEL, SEQUELA: ICD-10-CM

## 2023-11-07 DIAGNOSIS — G89.29 CHRONIC BACK PAIN, UNSPECIFIED BACK LOCATION, UNSPECIFIED BACK PAIN LATERALITY: ICD-10-CM

## 2023-11-07 DIAGNOSIS — M54.9 CHRONIC BACK PAIN, UNSPECIFIED BACK LOCATION, UNSPECIFIED BACK PAIN LATERALITY: ICD-10-CM

## 2023-11-08 RX ORDER — GABAPENTIN 300 MG/1
300 CAPSULE ORAL 3 TIMES DAILY
Qty: 90 CAPSULE | Refills: 0 | OUTPATIENT
Start: 2023-11-08

## 2023-11-08 NOTE — TELEPHONE ENCOUNTER
Pt is supposed to follow up with psychiatry on the referral given in her one OV with me. Further medication management to be taken care by new PCP whom she is supposed to establish care as spoken to me and documented on her last VV.

## 2023-11-09 NOTE — TELEPHONE ENCOUNTER
Patient Contact     Attempt # 1     Was call answered?    Yes  Pt was on her way to another appointment, unable to talk at this time. Will send [employer] message and gave clinic number to call back if needed.     On call back:      -Relay message from Dr. Poole, see below.    Lou MIRANDA RN  [facility]

## 2023-11-10 NOTE — TELEPHONE ENCOUNTER
Triage RN attempted to call the patient back since she has not read the Mychart message sent yesterday (11/9/23). LVM for patient to either check her Mychart or call the clinic back.     Patient Contact    Attempt # 2    Was call answered?  No.  Left message on voicemail with information to call me back.    Upon call back: please relay message from the provider below.     Siomara Madsen RN

## 2023-11-20 NOTE — TELEPHONE ENCOUNTER
Patient Contact    Attempt # 3    Was call answered?  No.  Left message on voicemail with information to call back or review message in [employer] .     Mary PRINGLE, Triage RN  [facility]

## 2023-11-22 DIAGNOSIS — M54.9 CHRONIC BACK PAIN, UNSPECIFIED BACK LOCATION, UNSPECIFIED BACK PAIN LATERALITY: ICD-10-CM

## 2023-11-22 DIAGNOSIS — S32.000S COMPRESSION FRACTURE OF LUMBAR VERTEBRA, UNSPECIFIED LUMBAR VERTEBRAL LEVEL, SEQUELA: ICD-10-CM

## 2023-11-22 DIAGNOSIS — G89.29 CHRONIC BACK PAIN, UNSPECIFIED BACK LOCATION, UNSPECIFIED BACK PAIN LATERALITY: ICD-10-CM

## 2023-11-22 RX ORDER — GABAPENTIN 300 MG/1
300 CAPSULE ORAL 3 TIMES DAILY
Qty: 90 CAPSULE | Refills: 0 | OUTPATIENT
Start: 2023-11-22

## 2023-11-22 NOTE — TELEPHONE ENCOUNTER
Due for follow up, has not been seen for more than one year. Verna has been given. Marielle Thompson, WILDER, FNP-BC

## 2024-01-22 NOTE — PROGRESS NOTES
"CC:         Chief Complaint   Patient presents with     Follow Up       suboxone      HPI:  Francisca Reed is a 34 year old  who presents for suboxone follow up, initiated on 9/9. She is taking 8mg but feels that this dosage isn't lasting throughout the entire day and would like to go back up to 12 mg/day.    Amphetamines on last UDS (10/14), reports occasional use of adderall, trying to ween off. Currently reports that she is taking adderall approximately once q 6 mo.    She would like to see a mental health specialist though no insurance, and unable to afford any additional care. She has applied for a few \"grants\" but feels it has been difficult to find coverage for herself because of her husbands income, despite having the expense of 5 children. Acknowledges that better coverage would be good for her. Notes that her  thinks she may be able to get insurance \"in a year or two.\"    Still taking full dose of sertraline, feels it is helping somewhat, no side effects noted, will continue to use.     She reports that she has night terror \"week\" every 6 wks and bouts of insomnia every 6 weeks. She reports that these have been going on for years.    Patient also reports painful paresthesias in her left wrist in her wrist and first fore fingers, on the palmar and dorsal aspects for the past 3 wks. Reports the pain is worst at night and sometimes wakes her up.     History: Using opioids (pills) for 10 years, first began after a back surgery, has used on an off, mostly gotten illicitly, using a combination of suboxone and oxy over past month prior to initiation, no consistent dosing. She does have a depression hx, also relatively severe postpartum depression, denies other mental health hx, denies abuse, violence, sexual trauma hx. Identifies as having an opioid addiction and wants help. , parenting children. No health insurance.     EXAM:  /60 (BP Location: Right arm, Patient Position: Chair, Cuff " Size: Adult Regular)   Pulse 80   Wt 54.9 kg (121 lb)   LMP  (LMP Unknown)   BMI 19.22 kg/m      Gen: Alert, oriented, appropriately interactive, NAD  Exam not repeated today     Labs & Imaging: (9/9/19)  CMP wnl  PAP NIL, HPV-  Hep C-  HBSA-  Trep-  HIV-  GC/Chlam-     ASSESSMENT/PLAN: Francisca Reed is a 34 year old  who presents for suboxone follow up.     1. Opioid use disorder (H)  - Overall stable, no concerns for relapse, not craving   -  Increase to 12 mg/day given some withdrawal symptoms  - buprenorphine-naloxone (SUBOXONE) 8-2 MG SUBL sublingual tablet; Place 1 tablet under the tongue daily 12mg per day, q24hr dose, will break tabs in half  Dispense: 42 tablet; Refill: 0     2. Postpartum depression  - I support her desire for mental health care, encouraged her to reach out to her [facility]  for resources   - Continue sertraline (ZOLOFT) 50 MG tablet    3. Insomnia   - Initiate trazodone, 50 mg PRN HS for insomnia.   - f/u if not effective  - traZODone (DESYREL) 50 MG tablet; Take 1 tablet (50 mg) by mouth nightly as needed for sleep  Dispense: 30 tablet; Refill: 3    4. Wrist pain  - Educated pt that it is likely carpal tunnel syndrome  - Advised pt to see her PCP regarding further assessment and management     I, Marilou Ramirez, am serving as scribe to document services personally performed by Dr. Silvino Amado based on my observations and the provider's statements to me. The patient's medical records were reviewed. I went into the room and spoke to the patient.     ENA Burciaga-S2    The above note is an accurate record of my words and actions.     Silvino Amado MD  OB/GYN  November 18, 2019, 3:46 PM        4 = No assist / stand by assistance

## 2024-02-02 ENCOUNTER — E-VISIT (OUTPATIENT)
Dept: FAMILY MEDICINE | Facility: CLINIC | Age: 39
End: 2024-02-02
Payer: COMMERCIAL

## 2024-02-02 DIAGNOSIS — F33.2 SEVERE EPISODE OF RECURRENT MAJOR DEPRESSIVE DISORDER, WITHOUT PSYCHOTIC FEATURES (H): ICD-10-CM

## 2024-02-02 DIAGNOSIS — N39.0 URINARY TRACT INFECTION WITHOUT HEMATURIA, SITE UNSPECIFIED: Primary | ICD-10-CM

## 2024-02-02 DIAGNOSIS — M54.9 CHRONIC BACK PAIN, UNSPECIFIED BACK LOCATION, UNSPECIFIED BACK PAIN LATERALITY: ICD-10-CM

## 2024-02-02 DIAGNOSIS — S32.000S COMPRESSION FRACTURE OF LUMBAR VERTEBRA, UNSPECIFIED LUMBAR VERTEBRAL LEVEL, SEQUELA: ICD-10-CM

## 2024-02-02 DIAGNOSIS — F43.23 ADJUSTMENT DISORDER WITH MIXED ANXIETY AND DEPRESSED MOOD: ICD-10-CM

## 2024-02-02 DIAGNOSIS — R25.1 TREMORS OF NERVOUS SYSTEM: ICD-10-CM

## 2024-02-02 DIAGNOSIS — G89.29 CHRONIC BACK PAIN, UNSPECIFIED BACK LOCATION, UNSPECIFIED BACK PAIN LATERALITY: ICD-10-CM

## 2024-02-02 PROCEDURE — 99207 PR NON-BILLABLE SERV PER CHARTING: CPT | Performed by: NURSE PRACTITIONER

## 2024-02-02 RX ORDER — VENLAFAXINE HYDROCHLORIDE 150 MG/1
150 CAPSULE, EXTENDED RELEASE ORAL DAILY
Qty: 90 CAPSULE | Refills: 1 | Status: SHIPPED | OUTPATIENT
Start: 2024-02-02

## 2024-02-02 RX ORDER — SULFAMETHOXAZOLE/TRIMETHOPRIM 800-160 MG
1 TABLET ORAL 2 TIMES DAILY
Qty: 6 TABLET | Refills: 0 | Status: SHIPPED | OUTPATIENT
Start: 2024-02-02

## 2024-02-02 RX ORDER — BUSPIRONE HYDROCHLORIDE 5 MG/1
TABLET ORAL
Qty: 270 TABLET | Refills: 1 | Status: SHIPPED | OUTPATIENT
Start: 2024-02-02

## 2024-02-02 RX ORDER — GABAPENTIN 300 MG/1
300 CAPSULE ORAL 3 TIMES DAILY
Qty: 270 CAPSULE | Refills: 1 | Status: SHIPPED | OUTPATIENT
Start: 2024-02-02

## 2024-02-02 RX ORDER — BACLOFEN 20 MG/1
TABLET ORAL
Qty: 270 TABLET | Refills: 1 | Status: SHIPPED | OUTPATIENT
Start: 2024-02-02

## 2024-02-02 RX ORDER — CYCLOBENZAPRINE HCL 10 MG
10 TABLET ORAL
Qty: 90 TABLET | Refills: 1 | Status: SHIPPED | OUTPATIENT
Start: 2024-02-02

## 2024-04-28 NOTE — TELEPHONE ENCOUNTER
Patient has appointment with Dr. Riley on May 18th at 2pm for suboxone med management. Dr. Riley does not manage this med, so this appointment needs to be rescheduled. Spoke to Marnie, Dr. Khoury's MA and she  said it needs to be clarified why she's coming in, she hasn't been on suboxone for months it looks like. If it's for sure for that, she needs a 30-45 min appt. Dr. Luke's next opening isn't until June.. so she'd need to come them in [place] or [place], since they start there in June.     Attempted to call patient to get appointment changed. Please try again later.    Priya Robbins on 5/12/2021 at 10:18 AM      
Patient notified.    She was offered pain clinic number and she will go back to Dr. Ingris Murcia RN on 5/12/2021 at 10:29 AM    
Vaccine status unknown

## 2024-07-06 ENCOUNTER — HEALTH MAINTENANCE LETTER (OUTPATIENT)
Age: 39
End: 2024-07-06

## 2024-11-09 ASSESSMENT — PATIENT HEALTH QUESTIONNAIRE - PHQ9: SUM OF ALL RESPONSES TO PHQ QUESTIONS 1-9: 12
